# Patient Record
Sex: FEMALE | Race: BLACK OR AFRICAN AMERICAN | Employment: OTHER | URBAN - METROPOLITAN AREA
[De-identification: names, ages, dates, MRNs, and addresses within clinical notes are randomized per-mention and may not be internally consistent; named-entity substitution may affect disease eponyms.]

---

## 2018-05-25 ENCOUNTER — HOSPITAL ENCOUNTER (EMERGENCY)
Age: 78
Discharge: HOME | End: 2018-05-25
Attending: EMERGENCY MEDICINE
Payer: MEDICARE

## 2018-05-25 VITALS
SYSTOLIC BLOOD PRESSURE: 144 MMHG | DIASTOLIC BLOOD PRESSURE: 64 MMHG | TEMPERATURE: 98.1 F | BODY MASS INDEX: 41.99 KG/M2 | WEIGHT: 237 LBS | HEIGHT: 63 IN | HEART RATE: 90 BPM | OXYGEN SATURATION: 92 % | RESPIRATION RATE: 18 BRPM

## 2018-05-25 DIAGNOSIS — B02.29 POSTHERPETIC NEURALGIA: Primary | ICD-10-CM

## 2018-05-25 PROCEDURE — 99283 EMERGENCY DEPT VISIT LOW MDM: CPT | Performed by: EMERGENCY MEDICINE

## 2018-05-25 RX ORDER — VALACYCLOVIR HYDROCHLORIDE 1 G/1
1000 TABLET, FILM COATED ORAL 2 TIMES DAILY
Qty: 10 TAB | Refills: 2 | Status: SHIPPED | OUTPATIENT
Start: 2018-05-25 | End: 2018-06-15 | Stop reason: SDUPTHER

## 2018-05-25 RX ORDER — GABAPENTIN 100 MG/1
CAPSULE ORAL
Qty: 30 CAP | Refills: 1 | Status: SHIPPED | OUTPATIENT
Start: 2018-05-25 | End: 2020-11-04

## 2018-05-25 RX ORDER — TRAMADOL HYDROCHLORIDE 50 MG/1
50 TABLET ORAL
Qty: 17 TAB | Refills: 0 | Status: SHIPPED | OUTPATIENT
Start: 2018-05-25 | End: 2018-06-29 | Stop reason: DRUGHIGH

## 2018-05-25 RX ORDER — TETRACAINE HYDROCHLORIDE 5 MG/ML
1 SOLUTION OPHTHALMIC
Status: DISCONTINUED | OUTPATIENT
Start: 2018-05-25 | End: 2018-05-25 | Stop reason: ALTCHOICE

## 2018-05-25 NOTE — ED TRIAGE NOTES
Pt in c/o headache for several years after having shingles. States pain has been constant for past 2 months.

## 2018-05-25 NOTE — DISCHARGE INSTRUCTIONS
Neuropathic Pain: Care Instructions  Your Care Instructions    Neuropathic pain is caused by pressure on or damage to your nerves. It's often simply called nerve pain. Some people feel this type of pain all the time. For others, it comes and goes. Diabetes, shingles, or an injury can cause nerve pain. Many people say the pain feels sharp, burning, or stabbing. But some people feel it as a dull ache. In some cases, it makes your skin very sensitive. So touch, pressure, and other sensations that did not hurt before may now cause pain. It's important to know that this kind of pain is real and can affect your quality of life. It's also important to know that treatment can help. Treatment includes pain medicines, exercise, and physical therapy. Medicines can help reduce the number of pain signals that travel over the nerves. This can make the painful areas less sensitive. It can also help you sleep better and improve your mood. But medicines are only one part of successful treatment. Most people do best with more than one kind of treatment. Your doctor may recommend that you try cognitive-behavioral therapy and stress management. Or, if needed, you may decide to try to quit smoking, lower your blood pressure, or better control blood sugar. These kinds of healthy changes can also make a difference. If you feel that your treatment is not working, talk to your doctor. And be sure to tell your doctor if you think you might be depressed or anxious. These are common problems that can also be treated. Follow-up care is a key part of your treatment and safety. Be sure to make and go to all appointments, and call your doctor if you are having problems. It's also a good idea to know your test results and keep a list of the medicines you take. How can you care for yourself at home? · Be safe with medicines. Read and follow all instructions on the label.   ¨ If the doctor gave you a prescription medicine for pain, take it as prescribed. ¨ If you are not taking a prescription pain medicine, ask your doctor if you can take an over-the-counter medicine. · Save hard tasks for days when you have less pain. Follow a hard task with an easy task. And remember to take breaks. · Relax, and reduce stress. You may want to try deep breathing or meditation. These can help. · Keep moving. Gentle, daily exercise can help reduce pain. Your doctor or physical therapist can tell you what type of exercise is best for you. This may include walking, swimming, and stationary biking. It may also include stretches and range-of-motion exercises. · Try heat, cold packs, and massage. · Get enough sleep. Constant pain can make you more tired. If the pain makes it hard to sleep, talk with your doctor. · Think positively. Your thoughts can affect your pain. Do fun things to distract yourself from the pain. See a movie, read a book, listen to music, or spend time with a friend. · Keep a pain diary. Try to write down how strong your pain is and what it feels like. Also try to notice and write down how your moods, thoughts, sleep, activities, and medicine affect your pain. These notes can help you and your doctor find the best ways to treat your pain. Reducing constipation caused by pain medicine  Pain medicines often cause constipation. To reduce constipation:  · Include fruits, vegetables, beans, and whole grains in your diet each day. These foods are high in fiber. · Drink plenty of fluids, enough so that your urine is light yellow or clear like water. If you have kidney, heart, or liver disease and have to limit fluids, talk with your doctor before you increase the amount of fluids you drink. · Get some exercise every day. Build up slowly to 30 to 60 minutes a day on 5 or more days of the week. · Take a fiber supplement, such as Citrucel or Metamucil, every day if needed. Read and follow all instructions on the label.   · Schedule time each day for a bowel movement. Having a daily routine may help. Take your time and do not strain when having a bowel movement. · Ask your doctor about a laxative. The goal is to have one easy bowel movement every 1 to 2 days. Do not let constipation go untreated for more than 3 days. When should you call for help? Call your doctor now or seek immediate medical care if:  ? · You feel sad, anxious, or hopeless for more than a few days. This could mean you are depressed. Depression is common in people who have a lot of pain. But it can be treated. ? · You have trouble with bowel movements, such as:  ¨ No bowel movement in 3 days. ¨ Blood in the anal area, in your stool, or on the toilet paper. ¨ Diarrhea for more than 24 hours. ? Watch closely for changes in your health, and be sure to contact your doctor if:  ? · Your pain is getting worse. ? · You can't sleep because of pain. ? · You are very worried or anxious about your pain. ? · You have trouble taking your pain medicine. ? · You have any concerns about your pain medicine or its side effects. ? · You have vomiting or cramps for more than 2 hours. Where can you learn more? Go to http://jaelyn-janey.info/. Enter A893 in the search box to learn more about \"Neuropathic Pain: Care Instructions. \"  Current as of: October 14, 2016  Content Version: 11.4  © 1881-3966 [employer]. Care instructions adapted under license by [employer] (which disclaims liability or warranty for this information). If you have questions about a medical condition or this instruction, always ask your healthcare professional. [address] any warranty or liability for your use of this information.

## 2018-05-25 NOTE — ED PROVIDER NOTES
HPI Comments: 65-year-old female presents to the emergency department with left sided head pain. Started several weeks ago. Getting worse. No alleviating. She had shingles several years ago. Had intermittent episodes of pain since then. This is worse    Patient is a 77 y.o. female presenting with headaches. Headache           Past Medical History:   Diagnosis Date    Arthritis     Diabetes ([address]     Hypertension     Shingles        History reviewed. No pertinent surgical history. History reviewed. No pertinent family history. Social History     Social History    Marital status: SINGLE     Spouse name: N/A    Number of children: N/A    Years of education: N/A     Occupational History    Not on file. Social History Main Topics    Smoking status: Current Some Day Smoker    Smokeless tobacco: Never Used    Alcohol use No    Drug use: No    Sexual activity: Not on file     Other Topics Concern    Not on file     Social History Narrative    No narrative on file         ALLERGIES: Review of patient's allergies indicates no known allergies. Review of Systems   Constitutional: Negative for chills. Eyes: Positive for photophobia and pain. Neurological: Positive for headaches. Vitals:    05/25/18 0949 05/25/18 0954   BP: 124/66    Pulse: 90    Resp: 18    Temp: 98.1 °F (36.7 °C)    SpO2: 95% 95%   Weight: 107.5 kg (237 lb)    Height: 5' 3\" (1.6 m)             Physical Exam   Constitutional: She is oriented to person, place, and time. She appears well-developed and well-nourished. HENT:   tthe left scalp frontal temporal upper cheek is hyperesthetic. No rash or lesions is noted   Eyes:   The eyes red. Fluorescein staining is performed. No areas of increased uptake. No dendritic lesions. Neurological: She is alert and oriented to person, place, and time. Skin: Skin is warm and dry. Psychiatric: She has a normal mood and affect.  Her behavior is normal.   Nursing note and vitals reviewed. MDM  Number of Diagnoses or Management Options  Diagnosis management comments: 44-year-old female with unilateral headache. Paresthesias. Hyperesthesia. History significant shingles on that side several years ago    Suspect she has a postherpetic neuralgia. Treat her pain. Discharged follow-up with primary care.         ED Course       Procedures

## 2018-05-25 NOTE — ED NOTES
I have reviewed discharge instructions with the patient and caregiver. The patient and caregiver verbalized understanding. Patient left ED via Discharge Method: wheelchair to Home with grandaughter  Opportunity for questions and clarification provided. Patient given 3 scripts. To continue your aftercare when you leave the hospital, you may receive an automated call from our care team to check in on how you are doing. This is a free service and part of our promise to provide the best care and service to meet your aftercare needs.  If you have questions, or wish to unsubscribe from this service please call [phone]. Thank you for Choosing our [facility] Emergency Department.

## 2018-06-15 PROBLEM — R20.2 PARESTHESIA: Status: ACTIVE | Noted: 2018-06-15

## 2018-06-15 PROBLEM — B02.9 HERPES ZOSTER: Status: ACTIVE | Noted: 2018-06-15

## 2018-06-15 PROBLEM — B02.22 ACUTE TRIGEMINAL HERPES ZOSTER: Status: ACTIVE | Noted: 2018-06-15

## 2018-06-15 PROBLEM — E66.01 SEVERE OBESITY (BMI 35.0-39.9): Status: ACTIVE | Noted: 2018-06-15

## 2018-06-29 PROBLEM — B02.22 ACUTE TRIGEMINAL HERPES ZOSTER: Status: RESOLVED | Noted: 2018-06-15 | Resolved: 2018-06-29

## 2018-07-18 PROBLEM — R74.8 ELEVATED ALKALINE PHOSPHATASE LEVEL: Status: ACTIVE | Noted: 2018-07-18

## 2018-07-18 PROBLEM — M19.90 ARTHRITIS: Status: ACTIVE | Noted: 2018-07-18

## 2018-08-31 ENCOUNTER — HOSPITAL ENCOUNTER (EMERGENCY)
Age: 78
Discharge: HOME | End: 2018-08-31
Attending: EMERGENCY MEDICINE
Payer: MEDICARE

## 2018-08-31 ENCOUNTER — APPOINTMENT (OUTPATIENT)
Dept: GENERAL RADIOLOGY | Age: 78
End: 2018-08-31
Attending: EMERGENCY MEDICINE
Payer: MEDICARE

## 2018-08-31 ENCOUNTER — APPOINTMENT (OUTPATIENT)
Dept: ULTRASOUND IMAGING | Age: 78
End: 2018-08-31
Attending: EMERGENCY MEDICINE
Payer: MEDICARE

## 2018-08-31 ENCOUNTER — APPOINTMENT (OUTPATIENT)
Dept: CT IMAGING | Age: 78
End: 2018-08-31
Attending: EMERGENCY MEDICINE
Payer: MEDICARE

## 2018-08-31 VITALS
HEIGHT: 63 IN | BODY MASS INDEX: 41.99 KG/M2 | SYSTOLIC BLOOD PRESSURE: 153 MMHG | WEIGHT: 237 LBS | OXYGEN SATURATION: 95 % | HEART RATE: 71 BPM | RESPIRATION RATE: 13 BRPM | DIASTOLIC BLOOD PRESSURE: 119 MMHG | TEMPERATURE: 98.6 F

## 2018-08-31 DIAGNOSIS — M79.661 RIGHT CALF PAIN: Primary | ICD-10-CM

## 2018-08-31 LAB
ANION GAP SERPL CALC-SCNC: 8 MMOL/L (ref 7–16)
APTT PPP: <20 SEC (ref 23.2–35.3)
ATRIAL RATE: 72 BPM
BASOPHILS # BLD: 0 K/UL (ref 0–0.2)
BASOPHILS NFR BLD: 0 % (ref 0–2)
BUN SERPL-MCNC: 19 MG/DL (ref 8–23)
CALCIUM SERPL-MCNC: 8.8 MG/DL (ref 8.3–10.4)
CALCULATED P AXIS, ECG09: 67 DEGREES
CALCULATED R AXIS, ECG10: -8 DEGREES
CALCULATED T AXIS, ECG11: 65 DEGREES
CHLORIDE SERPL-SCNC: 106 MMOL/L (ref 98–107)
CO2 SERPL-SCNC: 29 MMOL/L (ref 21–32)
CREAT SERPL-MCNC: 1.14 MG/DL (ref 0.6–1)
DIAGNOSIS, 93000: NORMAL
DIFFERENTIAL METHOD BLD: NORMAL
EOSINOPHIL # BLD: 0.3 K/UL (ref 0–0.8)
EOSINOPHIL NFR BLD: 3 % (ref 0.5–7.8)
ERYTHROCYTE [DISTWIDTH] IN BLOOD BY AUTOMATED COUNT: 18.4 %
GLUCOSE SERPL-MCNC: 100 MG/DL (ref 65–100)
HCT VFR BLD AUTO: 45 % (ref 35.8–46.3)
HGB BLD-MCNC: 14.7 G/DL (ref 11.7–15.4)
IMM GRANULOCYTES # BLD: 0 K/UL (ref 0–0.5)
IMM GRANULOCYTES NFR BLD AUTO: 0 % (ref 0–5)
INR PPP: 1
LYMPHOCYTES # BLD: 2.9 K/UL (ref 0.5–4.6)
LYMPHOCYTES NFR BLD: 32 % (ref 13–44)
MCH RBC QN AUTO: 31.3 PG (ref 26.1–32.9)
MCHC RBC AUTO-ENTMCNC: 32.7 G/DL (ref 31.4–35)
MCV RBC AUTO: 95.9 FL (ref 79.6–97.8)
MONOCYTES # BLD: 0.9 K/UL (ref 0.1–1.3)
MONOCYTES NFR BLD: 10 % (ref 4–12)
NEUTS SEG # BLD: 5 K/UL (ref 1.7–8.2)
NEUTS SEG NFR BLD: 55 % (ref 43–78)
NRBC # BLD: 0 K/UL (ref 0–0.2)
P-R INTERVAL, ECG05: 208 MS
PLATELET # BLD AUTO: 267 K/UL (ref 150–450)
PMV BLD AUTO: NORMAL FL (ref 9.4–12.3)
POTASSIUM SERPL-SCNC: 4.5 MMOL/L (ref 3.5–5.1)
PROTHROMBIN TIME: 12.4 SEC (ref 11.5–14.5)
Q-T INTERVAL, ECG07: 380 MS
QRS DURATION, ECG06: 78 MS
QTC CALCULATION (BEZET), ECG08: 416 MS
RBC # BLD AUTO: 4.69 M/UL (ref 4.05–5.2)
SODIUM SERPL-SCNC: 143 MMOL/L (ref 136–145)
VENTRICULAR RATE, ECG03: 72 BPM
WBC # BLD AUTO: 9.1 K/UL (ref 4.3–11.1)

## 2018-08-31 PROCEDURE — 93971 EXTREMITY STUDY: CPT

## 2018-08-31 PROCEDURE — 99284 EMERGENCY DEPT VISIT MOD MDM: CPT | Performed by: EMERGENCY MEDICINE

## 2018-08-31 PROCEDURE — 71260 CT THORAX DX C+: CPT

## 2018-08-31 PROCEDURE — 80048 BASIC METABOLIC PNL TOTAL CA: CPT

## 2018-08-31 PROCEDURE — 85610 PROTHROMBIN TIME: CPT

## 2018-08-31 PROCEDURE — 74011000258 HC RX REV CODE- 258: Performed by: EMERGENCY MEDICINE

## 2018-08-31 PROCEDURE — 85730 THROMBOPLASTIN TIME PARTIAL: CPT

## 2018-08-31 PROCEDURE — 74011250636 HC RX REV CODE- 250/636: Performed by: EMERGENCY MEDICINE

## 2018-08-31 PROCEDURE — 74011636320 HC RX REV CODE- 636/320: Performed by: EMERGENCY MEDICINE

## 2018-08-31 PROCEDURE — 71045 X-RAY EXAM CHEST 1 VIEW: CPT

## 2018-08-31 PROCEDURE — 96374 THER/PROPH/DIAG INJ IV PUSH: CPT | Performed by: EMERGENCY MEDICINE

## 2018-08-31 PROCEDURE — 85025 COMPLETE CBC W/AUTO DIFF WBC: CPT

## 2018-08-31 PROCEDURE — 93005 ELECTROCARDIOGRAM TRACING: CPT | Performed by: EMERGENCY MEDICINE

## 2018-08-31 RX ORDER — TRAMADOL HYDROCHLORIDE 50 MG/1
50 TABLET ORAL
Qty: 14 TAB | Refills: 0 | Status: ON HOLD | OUTPATIENT
Start: 2018-08-31 | End: 2022-05-02

## 2018-08-31 RX ORDER — MORPHINE SULFATE 2 MG/ML
2 INJECTION, SOLUTION INTRAMUSCULAR; INTRAVENOUS ONCE
Status: COMPLETED | OUTPATIENT
Start: 2018-08-31 | End: 2018-08-31

## 2018-08-31 RX ORDER — SODIUM CHLORIDE 0.9 % (FLUSH) 0.9 %
10 SYRINGE (ML) INJECTION
Status: COMPLETED | OUTPATIENT
Start: 2018-08-31 | End: 2018-08-31

## 2018-08-31 RX ADMIN — MORPHINE SULFATE 2 MG: 2 INJECTION, SOLUTION INTRAMUSCULAR; INTRAVENOUS at 12:22

## 2018-08-31 RX ADMIN — IOPAMIDOL 100 ML: 755 INJECTION, SOLUTION INTRAVENOUS at 13:29

## 2018-08-31 RX ADMIN — SODIUM CHLORIDE 100 ML: 900 INJECTION, SOLUTION INTRAVENOUS at 13:29

## 2018-08-31 RX ADMIN — Medication 10 ML: at 13:29

## 2018-08-31 NOTE — Clinical Note
Studies today including ultrasound and CT of your chest show no evidence of blood clot in your legs or in your lungs. No fever or infectious changes identified. Strong pulses noted to both lower legs

## 2018-08-31 NOTE — ED PROVIDER NOTES
HPI Comments: For several dayshad some discomfort to her right calf. She's identified no significant swelling. No provoking event, injury, or other mechanism prior to onset of pain that she is aware of. Denies any chest pain or shortness of breath beyond her baseline. She is oxygen dependent as her norm. No fever. No redness. No cyanosis to the lower extremity. No history of arterial or venous disease. Seen this morning by Dr. Portia Jerez and sent to our department Patient is a 77 y.o. female presenting with leg pain. The history is provided by the patient. Leg Pain This is a new problem. The current episode started 2 days ago. The pain is present in the right lower leg. The pain is severe. Pertinent negatives include no numbness and full range of motion. The symptoms are aggravated by movement and palpation. She has tried nothing for the symptoms. Past Medical History:  
Diagnosis Date  Arthritis  Diabetes ([address]  Hypertension  Shingles History reviewed. No pertinent surgical history. History reviewed. No pertinent family history. Social History Social History  Marital status: SINGLE Spouse name: N/A  
 Number of children: N/A  
 Years of education: N/A Occupational History  Not on file. Social History Main Topics  Smoking status: Current Some Day Smoker  Smokeless tobacco: Never Used  Alcohol use No  
 Drug use: No  
 Sexual activity: Not on file Other Topics Concern  Not on file Social History Narrative ALLERGIES: Chocolate flavor and Triamcinolone acetonide Review of Systems Constitutional: Negative for chills and fever. HENT: Negative. Respiratory:  
     Initially wheezing but now resolved history of oxygen dependent Cardiovascular: Negative. Genitourinary: Negative. Neurological: Negative for numbness. All other systems reviewed and are negative. Vitals:  
 08/31/18 1118 BP: 117/64 Resp: 28 Temp: 98.6 °F (37 °C) SpO2: (!) 88% Weight: 107.5 kg (237 lb) Height: 5' 3\" (1.6 m) Physical Exam  
Constitutional: She appears well-developed and well-nourished. No distress. HENT:  
Head: Atraumatic. Eyes: No scleral icterus. Neck: Neck supple. Cardiovascular: Normal rate and intact distal pulses. Strong DP bilat with no cyanosis or pallor Pulmonary/Chest: Effort normal. No respiratory distress. Report of wheezing initially in the ER but time of my exam no wheezing identified Abdominal: Soft. Musculoskeletal: Normal range of motion. She exhibits tenderness. She exhibits no edema or deformity. Neurological: She is alert. She exhibits normal muscle tone. Coordination normal.  
Skin: Skin is warm and dry. No rash noted. No erythema. Psychiatric: Her behavior is normal. Thought content normal.  
Nursing note and vitals reviewed. MDM Number of Diagnoses or Management Options Right calf pain:  
Diagnosis management comments: Calf area discomfort with no evidence of bony pain adjacent. Ultrasound is ordered which shows no deep venous thrombosis. No evidence of cellulitis. Excellent distal perfusion. CT scan of the chest was ordered in triage and this is normal.  The patient has no pleuritic pain shortness of breath or acute respiratory other than baseline COPD and oxygen dependence Amount and/or Complexity of Data Reviewed Clinical lab tests: reviewed Tests in the radiology section of CPT®: reviewed and ordered Decide to obtain previous medical records or to obtain history from someone other than the patient: yes Discuss the patient with other providers: yes (Discussed with Dr. Isabella Todd before patient arrived) Risk of Complications, Morbidity, and/or Mortality Presenting problems: high Diagnostic procedures: low Management options: moderate General comments: Acute Pain recurring ×2 days.   No evidence of ischemic changes. Diagnostic studies show no venous issues. Most likely cramping type pain Patient Progress Patient progress: improved ED Course Procedures

## 2018-08-31 NOTE — ED NOTES
I have reviewed discharge instructions with the patient. The patient verbalized understanding. Patient left ED via Discharge Method: wheelchair to Home with family. Opportunity for questions and clarification provided. Patient given 1 scripts. To continue your aftercare when you leave the hospital, you may receive an automated call from our care team to check in on how you are doing. This is a free service and part of our promise to provide the best care and service to meet your aftercare needs.  If you have questions, or wish to unsubscribe from this service please call [phone]. [facility].

## 2018-08-31 NOTE — ED TRIAGE NOTES
Sent from Dr. Maryann Garcia office. Pt states right leg hurting all the way from knee to ankle. Pt actively wheezing in triage. States she normally wears 2L NC at home but left her tank at home today. 2L NC placed on pt in triage. Pt states she has no hx of blood clots. States leg is warm to touch and tender on palpation.

## 2018-08-31 NOTE — DISCHARGE INSTRUCTIONS
Leg Pain: Care Instructions  Your Care Instructions  Many things can cause leg pain. Too much exercise or overuse can cause a muscle cramp (or charley horse). You can get leg cramps from not eating a balanced diet that has enough potassium, calcium, and other minerals. If you do not drink enough fluids or are taking certain medicines, you may develop leg cramps. Other causes of leg pain include injuries, blood flow problems, nerve damage, and twisted and enlarged veins (varicose veins). You can usually ease pain with self-care. Your doctor may recommend that you rest your leg and keep it elevated. Follow-up care is a key part of your treatment and safety. Be sure to make and go to all appointments, and call your doctor if you are having problems. It's also a good idea to know your test results and keep a list of the medicines you take. How can you care for yourself at home? · Take pain medicines exactly as directed. ¨ If the doctor gave you a prescription medicine for pain, take it as prescribed. ¨ If you are not taking a prescription pain medicine, ask your doctor if you can take an over-the-counter medicine. · Take any other medicines exactly as prescribed. Call your doctor if you think you are having a problem with your medicine. · Rest your leg while you have pain, and avoid standing for long periods of time. · Prop up your leg at or above the level of your heart when possible. · Make sure you are eating a balanced diet that is rich in calcium, potassium, and magnesium, especially if you are pregnant. · If directed by your doctor, put ice or a cold pack on the area for 10 to 20 minutes at a time. Put a thin cloth between the ice and your skin. · Your leg may be in a splint, a brace, or an elastic bandage, and you may have crutches to help you walk. Follow your doctor's directions about how long to wear supports and how to use the crutches. When should you call for help?   Call 911 anytime you think you may need emergency care. For example, call if:    · You have sudden chest pain and shortness of breath, or you cough up blood.     · Your leg is cool or pale or changes color.    Call your doctor now or seek immediate medical care if:    · You have increasing or severe pain.     · Your leg suddenly feels weak and you cannot move it.     · You have signs of a blood clot, such as:  ¨ Pain in your calf, back of the knee, thigh, or groin. ¨ Redness and swelling in your leg or groin.     · You have signs of infection, such as:  ¨ Increased pain, swelling, warmth, or redness. ¨ Red streaks leading from the sore area. ¨ Pus draining from a place on your leg. ¨ A fever.     · You cannot bear weight on your leg.    Watch closely for changes in your health, and be sure to contact your doctor if:    · You do not get better as expected. Where can you learn more? Go to http://jaelyn-janey.info/. Enter G239 in the search box to learn more about \"Leg Pain: Care Instructions. \"  Current as of: November 20, 2017  Content Version: 11.7  © 1098-2152 [employer]. Care instructions adapted under license by "[employer]" (which disclaims liability or warranty for this information). If you have questions about a medical condition or this instruction, always ask your healthcare professional. [address] any warranty or liability for your use of this information.

## 2020-10-31 ENCOUNTER — HOSPITAL ENCOUNTER (INPATIENT)
Age: 80
LOS: 4 days | Discharge: HOME HEALTH | DRG: 689 | End: 2020-11-04
Attending: EMERGENCY MEDICINE | Admitting: HOSPITALIST
Payer: MEDICARE

## 2020-10-31 ENCOUNTER — APPOINTMENT (OUTPATIENT)
Dept: GENERAL RADIOLOGY | Age: 80
DRG: 689 | End: 2020-10-31
Attending: EMERGENCY MEDICINE
Payer: MEDICARE

## 2020-10-31 ENCOUNTER — APPOINTMENT (OUTPATIENT)
Dept: CT IMAGING | Age: 80
DRG: 689 | End: 2020-10-31
Attending: EMERGENCY MEDICINE
Payer: MEDICARE

## 2020-10-31 DIAGNOSIS — I50.9 CONGESTIVE HEART FAILURE, UNSPECIFIED HF CHRONICITY, UNSPECIFIED HEART FAILURE TYPE (HCC): ICD-10-CM

## 2020-10-31 DIAGNOSIS — E87.70 HYPERVOLEMIA, UNSPECIFIED HYPERVOLEMIA TYPE: ICD-10-CM

## 2020-10-31 DIAGNOSIS — R41.82 ALTERED MENTAL STATUS, UNSPECIFIED ALTERED MENTAL STATUS TYPE: ICD-10-CM

## 2020-10-31 DIAGNOSIS — N39.0 ACUTE UTI: Primary | ICD-10-CM

## 2020-10-31 LAB
ALBUMIN SERPL-MCNC: 2.9 G/DL (ref 3.2–4.6)
ALBUMIN/GLOB SERPL: 0.8 {RATIO} (ref 1.2–3.5)
ALP SERPL-CCNC: 109 U/L (ref 50–136)
ALT SERPL-CCNC: 22 U/L (ref 12–65)
ANION GAP SERPL CALC-SCNC: 1 MMOL/L (ref 7–16)
APPEARANCE UR: ABNORMAL
AST SERPL-CCNC: 23 U/L (ref 15–37)
BACTERIA URNS QL MICRO: ABNORMAL /HPF
BASOPHILS # BLD: 0 K/UL (ref 0–0.2)
BASOPHILS NFR BLD: 0 % (ref 0–2)
BILIRUB SERPL-MCNC: 0.5 MG/DL (ref 0.2–1.1)
BILIRUB UR QL: NEGATIVE
BNP SERPL-MCNC: 2536 PG/ML
BUN SERPL-MCNC: 14 MG/DL (ref 8–23)
CALCIUM SERPL-MCNC: 9.1 MG/DL (ref 8.3–10.4)
CHLORIDE SERPL-SCNC: 104 MMOL/L (ref 98–107)
CO2 SERPL-SCNC: 37 MMOL/L (ref 21–32)
COLOR UR: YELLOW
CREAT SERPL-MCNC: 0.88 MG/DL (ref 0.6–1)
CRP SERPL-MCNC: 7.9 MG/DL (ref 0–0.9)
DIFFERENTIAL METHOD BLD: ABNORMAL
EOSINOPHIL # BLD: 0.3 K/UL (ref 0–0.8)
EOSINOPHIL NFR BLD: 4 % (ref 0.5–7.8)
EPI CELLS #/AREA URNS HPF: ABNORMAL /HPF
ERYTHROCYTE [DISTWIDTH] IN BLOOD BY AUTOMATED COUNT: 15.4 % (ref 11.9–14.6)
GLOBULIN SER CALC-MCNC: 3.5 G/DL (ref 2.3–3.5)
GLUCOSE SERPL-MCNC: 72 MG/DL (ref 65–100)
GLUCOSE UR STRIP.AUTO-MCNC: NEGATIVE MG/DL
HCT VFR BLD AUTO: 52.7 % (ref 35.8–46.3)
HGB BLD-MCNC: 16 G/DL (ref 11.7–15.4)
HGB UR QL STRIP: NEGATIVE
IMM GRANULOCYTES # BLD AUTO: 0 K/UL (ref 0–0.5)
IMM GRANULOCYTES NFR BLD AUTO: 0 % (ref 0–5)
KETONES UR QL STRIP.AUTO: NEGATIVE MG/DL
LACTATE SERPL-SCNC: 1 MMOL/L (ref 0.4–2)
LEUKOCYTE ESTERASE UR QL STRIP.AUTO: ABNORMAL
LIPASE SERPL-CCNC: 136 U/L (ref 73–393)
LYMPHOCYTES # BLD: 1.4 K/UL (ref 0.5–4.6)
LYMPHOCYTES NFR BLD: 19 % (ref 13–44)
MAGNESIUM SERPL-MCNC: 1.9 MG/DL (ref 1.8–2.4)
MCH RBC QN AUTO: 31.3 PG (ref 26.1–32.9)
MCHC RBC AUTO-ENTMCNC: 30.4 G/DL (ref 31.4–35)
MCV RBC AUTO: 103.1 FL (ref 79.6–97.8)
MONOCYTES # BLD: 0.9 K/UL (ref 0.1–1.3)
MONOCYTES NFR BLD: 13 % (ref 4–12)
NEUTS SEG # BLD: 4.4 K/UL (ref 1.7–8.2)
NEUTS SEG NFR BLD: 63 % (ref 43–78)
NITRITE UR QL STRIP.AUTO: NEGATIVE
NRBC # BLD: 0 K/UL (ref 0–0.2)
PH UR STRIP: 7 [PH] (ref 5–9)
PLATELET # BLD AUTO: 157 K/UL (ref 150–450)
PMV BLD AUTO: 12.4 FL (ref 9.4–12.3)
POTASSIUM SERPL-SCNC: 4.9 MMOL/L (ref 3.5–5.1)
PROT SERPL-MCNC: 6.4 G/DL (ref 6.3–8.2)
PROT UR STRIP-MCNC: ABNORMAL MG/DL
RBC # BLD AUTO: 5.11 M/UL (ref 4.05–5.2)
SODIUM SERPL-SCNC: 142 MMOL/L (ref 136–145)
SP GR UR REFRACTOMETRY: 1.01 (ref 1–1.02)
TROPONIN-HIGH SENSITIVITY: 83.1 PG/ML (ref 0–14)
TROPONIN-HIGH SENSITIVITY: 90.9 PG/ML (ref 0–14)
TSH SERPL DL<=0.005 MIU/L-ACNC: 1.03 UIU/ML
UROBILINOGEN UR QL STRIP.AUTO: 2 EU/DL (ref 0.2–1)
WBC # BLD AUTO: 7 K/UL (ref 4.3–11.1)
WBC URNS QL MICRO: ABNORMAL /HPF

## 2020-10-31 PROCEDURE — 99284 EMERGENCY DEPT VISIT MOD MDM: CPT

## 2020-10-31 PROCEDURE — 87635 SARS-COV-2 COVID-19 AMP PRB: CPT

## 2020-10-31 PROCEDURE — 86140 C-REACTIVE PROTEIN: CPT

## 2020-10-31 PROCEDURE — 74011250637 HC RX REV CODE- 250/637: Performed by: HOSPITALIST

## 2020-10-31 PROCEDURE — 87088 URINE BACTERIA CULTURE: CPT

## 2020-10-31 PROCEDURE — 96374 THER/PROPH/DIAG INJ IV PUSH: CPT

## 2020-10-31 PROCEDURE — 84443 ASSAY THYROID STIM HORMONE: CPT

## 2020-10-31 PROCEDURE — 83690 ASSAY OF LIPASE: CPT

## 2020-10-31 PROCEDURE — 74011250636 HC RX REV CODE- 250/636: Performed by: EMERGENCY MEDICINE

## 2020-10-31 PROCEDURE — 87086 URINE CULTURE/COLONY COUNT: CPT

## 2020-10-31 PROCEDURE — 96375 TX/PRO/DX INJ NEW DRUG ADDON: CPT

## 2020-10-31 PROCEDURE — 36415 COLL VENOUS BLD VENIPUNCTURE: CPT

## 2020-10-31 PROCEDURE — 80053 COMPREHEN METABOLIC PANEL: CPT

## 2020-10-31 PROCEDURE — 2709999900 HC NON-CHARGEABLE SUPPLY

## 2020-10-31 PROCEDURE — 84484 ASSAY OF TROPONIN QUANT: CPT

## 2020-10-31 PROCEDURE — 87186 SC STD MICRODIL/AGAR DIL: CPT

## 2020-10-31 PROCEDURE — 70450 CT HEAD/BRAIN W/O DYE: CPT

## 2020-10-31 PROCEDURE — 81001 URINALYSIS AUTO W/SCOPE: CPT

## 2020-10-31 PROCEDURE — 83880 ASSAY OF NATRIURETIC PEPTIDE: CPT

## 2020-10-31 PROCEDURE — 83735 ASSAY OF MAGNESIUM: CPT

## 2020-10-31 PROCEDURE — 65660000000 HC RM CCU STEPDOWN

## 2020-10-31 PROCEDURE — 71045 X-RAY EXAM CHEST 1 VIEW: CPT

## 2020-10-31 PROCEDURE — 83605 ASSAY OF LACTIC ACID: CPT

## 2020-10-31 PROCEDURE — 85025 COMPLETE CBC W/AUTO DIFF WBC: CPT

## 2020-10-31 PROCEDURE — 74011000258 HC RX REV CODE- 258: Performed by: EMERGENCY MEDICINE

## 2020-10-31 PROCEDURE — 51701 INSERT BLADDER CATHETER: CPT

## 2020-10-31 RX ORDER — ENOXAPARIN SODIUM 100 MG/ML
40 INJECTION SUBCUTANEOUS EVERY 24 HOURS
Status: DISCONTINUED | OUTPATIENT
Start: 2020-11-01 | End: 2020-11-04 | Stop reason: HOSPADM

## 2020-10-31 RX ORDER — ONDANSETRON 2 MG/ML
4 INJECTION INTRAMUSCULAR; INTRAVENOUS
Status: DISCONTINUED | OUTPATIENT
Start: 2020-10-31 | End: 2020-11-04 | Stop reason: HOSPADM

## 2020-10-31 RX ORDER — MELATONIN
2000 2 TIMES DAILY
Status: DISCONTINUED | OUTPATIENT
Start: 2020-11-01 | End: 2020-11-04 | Stop reason: HOSPADM

## 2020-10-31 RX ORDER — GABAPENTIN 100 MG/1
100 CAPSULE ORAL 3 TIMES DAILY
Status: DISCONTINUED | OUTPATIENT
Start: 2020-10-31 | End: 2020-11-04 | Stop reason: HOSPADM

## 2020-10-31 RX ORDER — FUROSEMIDE 10 MG/ML
40 INJECTION INTRAMUSCULAR; INTRAVENOUS
Status: COMPLETED | OUTPATIENT
Start: 2020-10-31 | End: 2020-10-31

## 2020-10-31 RX ORDER — FUROSEMIDE 10 MG/ML
20 INJECTION INTRAMUSCULAR; INTRAVENOUS 2 TIMES DAILY
Status: DISCONTINUED | OUTPATIENT
Start: 2020-11-01 | End: 2020-11-02

## 2020-10-31 RX ORDER — ACETAMINOPHEN 325 MG/1
650 TABLET ORAL
Status: DISCONTINUED | OUTPATIENT
Start: 2020-10-31 | End: 2020-11-04 | Stop reason: HOSPADM

## 2020-10-31 RX ORDER — BUDESONIDE AND FORMOTEROL FUMARATE DIHYDRATE 160; 4.5 UG/1; UG/1
2 AEROSOL RESPIRATORY (INHALATION)
Status: DISCONTINUED | OUTPATIENT
Start: 2020-10-31 | End: 2020-11-04 | Stop reason: HOSPADM

## 2020-10-31 RX ORDER — LOSARTAN POTASSIUM 25 MG/1
25 TABLET ORAL DAILY
Status: DISCONTINUED | OUTPATIENT
Start: 2020-11-01 | End: 2020-11-04 | Stop reason: HOSPADM

## 2020-10-31 RX ORDER — TRAMADOL HYDROCHLORIDE 50 MG/1
50 TABLET ORAL
Status: DISCONTINUED | OUTPATIENT
Start: 2020-10-31 | End: 2020-11-04 | Stop reason: HOSPADM

## 2020-10-31 RX ORDER — LATANOPROST 50 UG/ML
1 SOLUTION/ DROPS OPHTHALMIC EVERY EVENING
Status: DISCONTINUED | OUTPATIENT
Start: 2020-11-01 | End: 2020-11-04 | Stop reason: HOSPADM

## 2020-10-31 RX ORDER — ATORVASTATIN CALCIUM 10 MG/1
10 TABLET, FILM COATED ORAL
Status: DISCONTINUED | OUTPATIENT
Start: 2020-10-31 | End: 2020-11-04 | Stop reason: HOSPADM

## 2020-10-31 RX ADMIN — CEFTRIAXONE 1 G: 1 INJECTION, POWDER, FOR SOLUTION INTRAMUSCULAR; INTRAVENOUS at 17:34

## 2020-10-31 RX ADMIN — FUROSEMIDE 40 MG: 10 INJECTION, SOLUTION INTRAMUSCULAR; INTRAVENOUS at 18:03

## 2020-10-31 RX ADMIN — ATORVASTATIN CALCIUM 10 MG: 10 TABLET, FILM COATED ORAL at 22:18

## 2020-10-31 RX ADMIN — GABAPENTIN 100 MG: 100 CAPSULE ORAL at 22:18

## 2020-10-31 RX ADMIN — ACETAMINOPHEN 650 MG: 325 TABLET, FILM COATED ORAL at 22:18

## 2020-10-31 NOTE — ED NOTES
Ms Lupe Francis daughter cell [phone] or [place] [phone]  Arturo Pererareena son cell [phone]

## 2020-10-31 NOTE — PROGRESS NOTES
TRANSFER - IN REPORT:    Verbal report received from Sadia Mccallum RN on Vielka Carrillo  being received from ED for routine progression of care      Report consisted of patients Situation, Background, Assessment and   Recommendations(SBAR). Information from the following report(s) SBAR, Kardex, ED Summary, Intake/Output and MAR was reviewed with the receiving nurse. Opportunity for questions and clarification was provided.

## 2020-10-31 NOTE — ED NOTES
TRANSFER - OUT REPORT:    Verbal report given to RN on Macie Barcenas  being transferred to [phone] for routine progression of care       Report consisted of patients Situation, Background, Assessment and   Recommendations(SBAR). Information from the following report(s) SBAR, ED Summary, [facility] and Recent Results was reviewed with the receiving nurse. Lines:   Peripheral IV 10/31/20 Left Antecubital (Active)   Site Assessment Clean, dry, & intact 10/31/20 1600   Phlebitis Assessment 0 10/31/20 1600   Infiltration Assessment 0 10/31/20 1600   Dressing Type Gauze;Tape;Transparent 10/31/20 1600   Hub Color/Line Status Blue;Flushed 10/31/20 1600        Opportunity for questions and clarification was provided.       Patient transported with:   Registered Nurse

## 2020-10-31 NOTE — ED TRIAGE NOTES
Pt daughter states that she has been confused since Wednesday, pt had slurred speech Wednesday, pt been incontinent, pt CO body aches all over and states stomach feels \"hot. \" PT able to answer questions of month and birthday but did not know why she was here. Pt O2 sat 83% on RA, states she wears 6 L O2 NC at home. 5 L applied NC and O2 sat at 100%. Mask applied to pt.

## 2020-10-31 NOTE — ED PROVIDER NOTES
79-year-old female with history of hypertension, chronic respiratory failure on 5-6 L O2 at baseline, diabetes, arthritis presents with daughter with complaint of confusion since Wednesday. According to daughter patient had slurred speech Wednesday which resolved. Additionally patient reportedly has been incontinent. Patient states that she has been having intermittent body aches. Denies chest pain, nausea, vomiting, urinary symptoms, URI symptoms, focal weakness, numbness, tingling. The history is provided by the patient. No  was used. Altered mental status    This is a new problem. The current episode started more than 2 days ago. The problem has not changed since onset. Associated symptoms include confusion. Pertinent negatives include no somnolence, no seizures, no unresponsiveness, no weakness, no agitation, no tingling and no numbness. Shortness of Breath   Pertinent negatives include no fever, no rhinorrhea, no sore throat, no neck pain, no wheezing, no chest pain, no vomiting and no rash. Generalized Body Aches   Associated symptoms include shortness of breath. Pertinent negatives include no chest pain. Past Medical History:   Diagnosis Date    Arthritis     Diabetes ([address]     Hypertension     Shingles        No past surgical history on file. No family history on file. Social History     Socioeconomic History    Marital status: SINGLE     Spouse name: Not on file    Number of children: Not on file    Years of education: Not on file    Highest education level: Not on file   Occupational History    Not on file   Social Needs    Financial resource strain: Not on file    Food insecurity     Worry: Not on file     Inability: Not on file    Transportation needs     Medical: Not on file     Non-medical: Not on file   Tobacco Use    Smoking status: Current Some Day Smoker    Smokeless tobacco: Never Used   Substance and Sexual Activity    Alcohol use:  No  Drug use: No    Sexual activity: Not on file   Lifestyle    Physical activity     Days per week: Not on file     Minutes per session: Not on file    Stress: Not on file   Relationships    Social connections     Talks on phone: Not on file     Gets together: Not on file     Attends [employer] service: Not on file     Active member of club or organization: Not on file     Attends meetings of clubs or organizations: Not on file     Relationship status: Not on file    Intimate partner violence     Fear of current or ex partner: Not on file     Emotionally abused: Not on file     Physically abused: Not on file     Forced sexual activity: Not on file   Other Topics Concern    Not on file   Social History Narrative    Not on file         ALLERGIES: Chocolate flavor and Triamcinolone acetonide    Review of Systems   Constitutional: Positive for chills. Negative for fatigue and fever. HENT: Negative for congestion, rhinorrhea and sore throat. Respiratory: Positive for shortness of breath. Negative for wheezing. Cardiovascular: Negative for chest pain. Gastrointestinal: Negative for constipation, diarrhea, nausea and vomiting. Genitourinary: Negative for dysuria and flank pain. Musculoskeletal: Negative for back pain, neck pain and neck stiffness. Skin: Negative for rash. Neurological: Positive for speech difficulty. Negative for dizziness, tingling, seizures, weakness, light-headedness and numbness. Psychiatric/Behavioral: Positive for confusion. Negative for agitation. Vitals:    10/31/20 1446 10/31/20 1502   BP: (!) 167/88    Pulse: 72    Resp: 24    Temp: 98.3 °F (36.8 °C)    SpO2: (!) 83% 99%   Weight: 86.2 kg (190 lb)    Height: 5' 3\" (1.6 m)             Physical Exam  Vitals signs and nursing note reviewed. Constitutional:       Comments: Chronically ill appearing.     HENT:      Mouth/Throat:      Mouth: Mucous membranes are moist.   Eyes:      Extraocular Movements: Extraocular movements intact. Pupils: Pupils are equal, round, and reactive to light. Neck:      Musculoskeletal: Normal range of motion. Cardiovascular:      Rate and Rhythm: Normal rate and regular rhythm. Heart sounds: Normal heart sounds. Pulmonary:      Effort: Pulmonary effort is normal.      Comments: Diminished lung sounds bilaterally  Abdominal:      Palpations: Abdomen is soft. Comments: Soft, NTND. No rebound or guarding. Skin:     General: Skin is dry. Neurological:      Mental Status: She is alert. Comments: Confused. No focal deficits. No facial droop. Normal sensory exam.  No drift. No meningeal signs. MDM  Number of Diagnoses or Management Options  Acute UTI: new and requires workup  Altered mental status, unspecified altered mental status type: new and requires workup  Congestive heart failure, unspecified HF chronicity, unspecified heart failure type ([facility] [address]): new and requires workup  Hypervolemia, unspecified hypervolemia type: new and requires workup  Diagnosis management comments: VSS. Afebrile. CT head negative. CBC + CMP unremarkable. ProBNP 2,536. TSH normal limits. UA w/  UTI. Rocephin 1 g IV. Urine cx added on. CXR w/ CHF/volume overload. Lasix 40 mg IV ordered. Ordered ABG. RT attempted to obtain ABG x2 with no success. Hospitalist consulted for admission.        Amount and/or Complexity of Data Reviewed  Clinical lab tests: ordered and reviewed  Tests in the radiology section of CPT®: ordered and reviewed  Tests in the medicine section of CPT®: ordered and reviewed  Review and summarize past medical records: yes  Independent visualization of images, tracings, or specimens: yes    Risk of Complications, Morbidity, and/or Mortality  Presenting problems: high  Diagnostic procedures: moderate  Management options: moderate    Patient Progress  Patient progress: stable    ED Course as of Oct 31 1735   Sat Oct 31, 2020   1654 CXR IMPRESSION:  MILD CONGESTIVE HEART FAILURE.    [DF]   1655 CT head IMPRESSION:  NO ACUTE INTRACRANIAL ABNORMALITY IDENTIFIED AT NONCONTRAST CT.    [DF]      ED Course User Index  [DF] Vicente Delgadillo MD       Procedures      Results Include:    Recent Results (from the past 24 hour(s))   CBC WITH AUTOMATED DIFF    Collection Time: 10/31/20  3:23 PM   Result Value Ref Range    WBC 7.0 4.3 - 11.1 K/uL    RBC 5.11 4.05 - 5.2 M/uL    HGB 16.0 (H) 11.7 - 15.4 g/dL    HCT 52.7 (H) 35.8 - 46.3 %    .1 (H) 79.6 - 97.8 FL    MCH 31.3 26.1 - 32.9 PG    MCHC 30.4 (L) 31.4 - 35.0 g/dL    RDW 15.4 (H) 11.9 - 14.6 %    PLATELET 835 115 - 806 K/uL    MPV 12.4 (H) 9.4 - 12.3 FL    ABSOLUTE NRBC 0.00 0.0 - 0.2 K/uL    DF AUTOMATED      NEUTROPHILS 63 43 - 78 %    LYMPHOCYTES 19 13 - 44 %    MONOCYTES 13 (H) 4.0 - 12.0 %    EOSINOPHILS 4 0.5 - 7.8 %    BASOPHILS 0 0.0 - 2.0 %    IMMATURE GRANULOCYTES 0 0.0 - 5.0 %    ABS. NEUTROPHILS 4.4 1.7 - 8.2 K/UL    ABS. LYMPHOCYTES 1.4 0.5 - 4.6 K/UL    ABS. MONOCYTES 0.9 0.1 - 1.3 K/UL    ABS. EOSINOPHILS 0.3 0.0 - 0.8 K/UL    ABS. BASOPHILS 0.0 0.0 - 0.2 K/UL    ABS. IMM. GRANS. 0.0 0.0 - 0.5 K/UL   METABOLIC PANEL, COMPREHENSIVE    Collection Time: 10/31/20  3:23 PM   Result Value Ref Range    Sodium 142 136 - 145 mmol/L    Potassium 4.9 3.5 - 5.1 mmol/L    Chloride 104 98 - 107 mmol/L    CO2 37 (H) 21 - 32 mmol/L    Anion gap 1 (L) 7 - 16 mmol/L    Glucose 72 65 - 100 mg/dL    BUN 14 8 - 23 MG/DL    Creatinine 0.88 0.6 - 1.0 MG/DL    GFR est AA >60 >60 ml/min/1.73m2    GFR est non-AA >60 >60 ml/min/1.73m2    Calcium 9.1 8.3 - 10.4 MG/DL    Bilirubin, total 0.5 0.2 - 1.1 MG/DL    ALT (SGPT) 22 12 - 65 U/L    AST (SGOT) 23 15 - 37 U/L    Alk.  phosphatase 109 50 - 136 U/L    Protein, total 6.4 6.3 - 8.2 g/dL    Albumin 2.9 (L) 3.2 - 4.6 g/dL    Globulin 3.5 2.3 - 3.5 g/dL    A-G Ratio 0.8 (L) 1.2 - 3.5     NT-PRO BNP    Collection Time: 10/31/20  3:23 PM   Result Value Ref Range    NT pro-BNP 2,536 (H) <450 PG/ML   TSH 3RD GENERATION    Collection Time: 10/31/20  3:23 PM   Result Value Ref Range    TSH 1.030 uIU/mL   LIPASE    Collection Time: 10/31/20  3:23 PM   Result Value Ref Range    Lipase 136 73 - 393 U/L   URINALYSIS W/ RFLX MICROSCOPIC    Collection Time: 10/31/20  4:42 PM   Result Value Ref Range    Color YELLOW      Appearance CLOUDY      Specific gravity 1.014 1.001 - 1.023      pH (UA) 7.0 5.0 - 9.0      Protein TRACE (A) NEG mg/dL    Glucose Negative mg/dL    Ketone Negative NEG mg/dL    Bilirubin Negative NEG      Blood Negative NEG      Urobilinogen 2.0 (H) 0.2 - 1.0 EU/dL    Nitrites Negative NEG      Leukocyte Esterase SMALL (A) NEG      WBC 10-20 0 /hpf    Epithelial cells 0-3 0 /hpf    Bacteria 4+ (H) 0 /hpf          Juan Craven MD; 10/31/2020 @4:04 PM Voice dictation software was used during the making of this note. This software is not perfect and grammatical and other typographical errors may be present.   This note has not been proofread for errors.  ===================================================================

## 2020-11-01 LAB
ALBUMIN SERPL-MCNC: 2.8 G/DL (ref 3.2–4.6)
ALBUMIN/GLOB SERPL: 0.7 {RATIO} (ref 1.2–3.5)
ALP SERPL-CCNC: 103 U/L (ref 50–136)
ALT SERPL-CCNC: 22 U/L (ref 12–65)
ANION GAP SERPL CALC-SCNC: 2 MMOL/L (ref 7–16)
AST SERPL-CCNC: 21 U/L (ref 15–37)
BILIRUB SERPL-MCNC: 0.6 MG/DL (ref 0.2–1.1)
BUN SERPL-MCNC: 12 MG/DL (ref 8–23)
CALCIUM SERPL-MCNC: 8.9 MG/DL (ref 8.3–10.4)
CHLORIDE SERPL-SCNC: 101 MMOL/L (ref 98–107)
CO2 SERPL-SCNC: 38 MMOL/L (ref 21–32)
COVID-19 RAPID TEST, COVR: NOT DETECTED
CREAT SERPL-MCNC: 0.76 MG/DL (ref 0.6–1)
ERYTHROCYTE [DISTWIDTH] IN BLOOD BY AUTOMATED COUNT: 15.6 % (ref 11.9–14.6)
GLOBULIN SER CALC-MCNC: 3.9 G/DL (ref 2.3–3.5)
GLUCOSE SERPL-MCNC: 83 MG/DL (ref 65–100)
HCT VFR BLD AUTO: 53.3 % (ref 35.8–46.3)
HGB BLD-MCNC: 16.1 G/DL (ref 11.7–15.4)
MCH RBC QN AUTO: 31.3 PG (ref 26.1–32.9)
MCHC RBC AUTO-ENTMCNC: 30.2 G/DL (ref 31.4–35)
MCV RBC AUTO: 103.7 FL (ref 79.6–97.8)
NRBC # BLD: 0 K/UL (ref 0–0.2)
PLATELET # BLD AUTO: 152 K/UL (ref 150–450)
PMV BLD AUTO: 12 FL (ref 9.4–12.3)
POTASSIUM SERPL-SCNC: 3.9 MMOL/L (ref 3.5–5.1)
PROT SERPL-MCNC: 6.7 G/DL (ref 6.3–8.2)
RBC # BLD AUTO: 5.14 M/UL (ref 4.05–5.2)
SARS COV-2, XPGCVT: NEGATIVE
SODIUM SERPL-SCNC: 141 MMOL/L (ref 136–145)
SOURCE, COVRS: NORMAL
TROPONIN-HIGH SENSITIVITY: 70.3 PG/ML (ref 0–14)
WBC # BLD AUTO: 7 K/UL (ref 4.3–11.1)

## 2020-11-01 PROCEDURE — 84484 ASSAY OF TROPONIN QUANT: CPT

## 2020-11-01 PROCEDURE — 74011000250 HC RX REV CODE- 250: Performed by: INTERNAL MEDICINE

## 2020-11-01 PROCEDURE — 94664 DEMO&/EVAL PT USE INHALER: CPT

## 2020-11-01 PROCEDURE — 94640 AIRWAY INHALATION TREATMENT: CPT

## 2020-11-01 PROCEDURE — 2709999900 HC NON-CHARGEABLE SUPPLY

## 2020-11-01 PROCEDURE — 74011000258 HC RX REV CODE- 258: Performed by: HOSPITALIST

## 2020-11-01 PROCEDURE — 74011250636 HC RX REV CODE- 250/636: Performed by: HOSPITALIST

## 2020-11-01 PROCEDURE — 77010033678 HC OXYGEN DAILY

## 2020-11-01 PROCEDURE — 94760 N-INVAS EAR/PLS OXIMETRY 1: CPT

## 2020-11-01 PROCEDURE — 74011000250 HC RX REV CODE- 250: Performed by: HOSPITALIST

## 2020-11-01 PROCEDURE — 36415 COLL VENOUS BLD VENIPUNCTURE: CPT

## 2020-11-01 PROCEDURE — 85027 COMPLETE CBC AUTOMATED: CPT

## 2020-11-01 PROCEDURE — 77030013140 HC MSK NEB VYRM -A

## 2020-11-01 PROCEDURE — 74011250637 HC RX REV CODE- 250/637: Performed by: HOSPITALIST

## 2020-11-01 PROCEDURE — 65660000000 HC RM CCU STEPDOWN

## 2020-11-01 PROCEDURE — 80053 COMPREHEN METABOLIC PANEL: CPT

## 2020-11-01 RX ORDER — ALBUTEROL SULFATE 2.5 MG/.5ML
2.5 SOLUTION RESPIRATORY (INHALATION)
Status: DISCONTINUED | OUTPATIENT
Start: 2020-11-01 | End: 2020-11-04 | Stop reason: HOSPADM

## 2020-11-01 RX ORDER — ALBUTEROL SULFATE 90 UG/1
2 AEROSOL, METERED RESPIRATORY (INHALATION)
Status: DISCONTINUED | OUTPATIENT
Start: 2020-11-01 | End: 2020-11-01 | Stop reason: ALTCHOICE

## 2020-11-01 RX ADMIN — VITAMIN D, TAB 1000IU (100/BT) 2 TABLET: 25 TAB at 08:41

## 2020-11-01 RX ADMIN — TRAMADOL HYDROCHLORIDE 50 MG: 50 TABLET, FILM COATED ORAL at 13:17

## 2020-11-01 RX ADMIN — GABAPENTIN 100 MG: 100 CAPSULE ORAL at 16:16

## 2020-11-01 RX ADMIN — VITAMIN D, TAB 1000IU (100/BT) 2 TABLET: 25 TAB at 17:35

## 2020-11-01 RX ADMIN — ENOXAPARIN SODIUM 40 MG: 40 INJECTION SUBCUTANEOUS at 08:43

## 2020-11-01 RX ADMIN — CEFTRIAXONE 1 G: 1 INJECTION, POWDER, FOR SOLUTION INTRAMUSCULAR; INTRAVENOUS at 16:18

## 2020-11-01 RX ADMIN — LATANOPROST 1 DROP: 50 SOLUTION OPHTHALMIC at 17:35

## 2020-11-01 RX ADMIN — TRAMADOL HYDROCHLORIDE 50 MG: 50 TABLET, FILM COATED ORAL at 05:05

## 2020-11-01 RX ADMIN — BUDESONIDE AND FORMOTEROL FUMARATE DIHYDRATE 2 PUFF: 160; 4.5 AEROSOL RESPIRATORY (INHALATION) at 08:01

## 2020-11-01 RX ADMIN — TRAMADOL HYDROCHLORIDE 50 MG: 50 TABLET, FILM COATED ORAL at 22:26

## 2020-11-01 RX ADMIN — FUROSEMIDE 20 MG: 10 INJECTION, SOLUTION INTRAMUSCULAR; INTRAVENOUS at 17:35

## 2020-11-01 RX ADMIN — GABAPENTIN 100 MG: 100 CAPSULE ORAL at 22:26

## 2020-11-01 RX ADMIN — GABAPENTIN 100 MG: 100 CAPSULE ORAL at 08:41

## 2020-11-01 RX ADMIN — BUDESONIDE AND FORMOTEROL FUMARATE DIHYDRATE 2 PUFF: 160; 4.5 AEROSOL RESPIRATORY (INHALATION) at 20:45

## 2020-11-01 RX ADMIN — LOSARTAN POTASSIUM 25 MG: 25 TABLET ORAL at 08:41

## 2020-11-01 RX ADMIN — ALBUTEROL SULFATE 2.5 MG: 2.5 SOLUTION RESPIRATORY (INHALATION) at 20:45

## 2020-11-01 RX ADMIN — FUROSEMIDE 20 MG: 10 INJECTION, SOLUTION INTRAMUSCULAR; INTRAVENOUS at 08:41

## 2020-11-01 RX ADMIN — ATORVASTATIN CALCIUM 10 MG: 10 TABLET, FILM COATED ORAL at 22:26

## 2020-11-01 NOTE — PROGRESS NOTES
Problem: Pressure Injury - Risk of  Goal: *Prevention of pressure injury  Description: Document Tanmay Scale and appropriate interventions in the flowsheet. Outcome: Progressing Towards Goal  Note: Pressure Injury Interventions:  Sensory Interventions: Minimize linen layers, Keep linens dry and wrinkle-free    Moisture Interventions: Minimize layers, Maintain skin hydration (lotion/cream), Limit adult briefs, Moisture barrier    Activity Interventions: Increase time out of bed    Mobility Interventions: HOB 30 degrees or less, Trapeze to reposition, PT/OT evaluation    Nutrition Interventions: Document food/fluid/supplement intake, Offer support with meals,snacks and hydration    Friction and Shear Interventions: Minimize layers, Trapeze to reposition                Problem: Patient Education: Go to Patient Education Activity  Goal: Patient/Family Education  Outcome: Progressing Towards Goal     Problem: Falls - Risk of  Goal: *Absence of Falls  Description: Document Courtney Fall Risk and appropriate interventions in the flowsheet.   Outcome: Progressing Towards Goal  Note: Fall Risk Interventions:  Mobility Interventions: Patient to call before getting OOB, Utilize walker, cane, or other assistive device, PT Consult for mobility concerns    Mentation Interventions: Increase mobility, More frequent rounding    Medication Interventions: Patient to call before getting OOB, Evaluate medications/consider consulting pharmacy, Teach patient to arise slowly    Elimination Interventions: Call light in reach, Patient to call for help with toileting needs              Problem: Patient Education: Go to Patient Education Activity  Goal: Patient/Family Education  Outcome: Progressing Towards Goal

## 2020-11-01 NOTE — PROGRESS NOTES
Progress Note    Patient: Coleman Guerrier MRN: [id number]  SSN: [id number]    YOB: 1940  Age: 79 y.o. Sex: female      Admit Date: 10/31/2020    LOS: 1 day     Subjective:   80 y/o female with hx chronic respiratory failure, on 24/7 O2 at 6L, HTN, DM2, arthritis who presents to ED with family for new onset confusion. Patient seen and examined at bedside. This morning having some SOB, denies chest pain, no abdominal pain, no nausea or vomiting. Objective:     Vitals:    11/01/20 0803 11/01/20 1105 11/01/20 1321 11/01/20 1441   BP:  119/68  126/70   Pulse:  81  78   Resp:  14  14   Temp:  98.1 °F (36.7 °C)  98.4 °F (36.9 °C)   SpO2: 93% 90%  90%   Weight:   95.3 kg (210 lb)    Height:            Intake and Output:  Current Shift: No intake/output data recorded. Last three shifts: No intake/output data recorded.     ROS  10 ROS negative except from stated on subjective    Physical Exam:   General: Alert, oriented, NAD  HEENT: NC/AT, EOM are intact  Neck: supple, no JVD  Cardiovascular: RRR, S1, S2, no murmurs  Respiratory: Wheezes, mild crackles  Abdomen: Soft, NT, ND  Back: No CVA tenderness, no paraspinal tenderness  Extremities: LE without pedal edema, no erythema  Neuro: A&O, CN are intact, no focal deficits  Skin: no rash or ulcers  Psych: good mood and affect    Lab/Data Review:  I have personally reviewed patients laboratory data showing  Recent Results (from the past 24 hour(s))   CBC WITH AUTOMATED DIFF    Collection Time: 10/31/20  3:23 PM   Result Value Ref Range    WBC 7.0 4.3 - 11.1 K/uL    RBC 5.11 4.05 - 5.2 M/uL    HGB 16.0 (H) 11.7 - 15.4 g/dL    HCT 52.7 (H) 35.8 - 46.3 %    .1 (H) 79.6 - 97.8 FL    MCH 31.3 26.1 - 32.9 PG    MCHC 30.4 (L) 31.4 - 35.0 g/dL    RDW 15.4 (H) 11.9 - 14.6 %    PLATELET 274 857 - 177 K/uL    MPV 12.4 (H) 9.4 - 12.3 FL    ABSOLUTE NRBC 0.00 0.0 - 0.2 K/uL    DF AUTOMATED      NEUTROPHILS 63 43 - 78 %    LYMPHOCYTES 19 13 - 44 %    MONOCYTES 13 (H) 4.0 - 12.0 %    EOSINOPHILS 4 0.5 - 7.8 %    BASOPHILS 0 0.0 - 2.0 %    IMMATURE GRANULOCYTES 0 0.0 - 5.0 %    ABS. NEUTROPHILS 4.4 1.7 - 8.2 K/UL    ABS. LYMPHOCYTES 1.4 0.5 - 4.6 K/UL    ABS. MONOCYTES 0.9 0.1 - 1.3 K/UL    ABS. EOSINOPHILS 0.3 0.0 - 0.8 K/UL    ABS. BASOPHILS 0.0 0.0 - 0.2 K/UL    ABS. IMM. GRANS. 0.0 0.0 - 0.5 K/UL   METABOLIC PANEL, COMPREHENSIVE    Collection Time: 10/31/20  3:23 PM   Result Value Ref Range    Sodium 142 136 - 145 mmol/L    Potassium 4.9 3.5 - 5.1 mmol/L    Chloride 104 98 - 107 mmol/L    CO2 37 (H) 21 - 32 mmol/L    Anion gap 1 (L) 7 - 16 mmol/L    Glucose 72 65 - 100 mg/dL    BUN 14 8 - 23 MG/DL    Creatinine 0.88 0.6 - 1.0 MG/DL    GFR est AA >60 >60 ml/min/1.73m2    GFR est non-AA >60 >60 ml/min/1.73m2    Calcium 9.1 8.3 - 10.4 MG/DL    Bilirubin, total 0.5 0.2 - 1.1 MG/DL    ALT (SGPT) 22 12 - 65 U/L    AST (SGOT) 23 15 - 37 U/L    Alk. phosphatase 109 50 - 136 U/L    Protein, total 6.4 6.3 - 8.2 g/dL    Albumin 2.9 (L) 3.2 - 4.6 g/dL    Globulin 3.5 2.3 - 3.5 g/dL    A-G Ratio 0.8 (L) 1.2 - 3.5     NT-PRO BNP    Collection Time: 10/31/20  3:23 PM   Result Value Ref Range    NT pro-BNP 2,536 (H) <450 PG/ML   TSH 3RD GENERATION    Collection Time: 10/31/20  3:23 PM   Result Value Ref Range    TSH 1.030 uIU/mL   LIPASE    Collection Time: 10/31/20  3:23 PM   Result Value Ref Range    Lipase 136 73 - 393 U/L   CULTURE, URINE    Collection Time: 10/31/20  3:42 PM    Specimen: Clean catch; Urine   Result Value Ref Range    Special Requests: NO SPECIAL REQUESTS      Culture result:        NO GROWTH AFTER SHORT PERIOD OF INCUBATION. FURTHER RESULTS TO FOLLOW AFTER OVERNIGHT INCUBATION.    URINALYSIS W/ RFLX MICROSCOPIC    Collection Time: 10/31/20  4:42 PM   Result Value Ref Range    Color YELLOW      Appearance CLOUDY      Specific gravity 1.014 1.001 - 1.023      pH (UA) 7.0 5.0 - 9.0      Protein TRACE (A) NEG mg/dL    Glucose Negative mg/dL    Ketone Negative NEG mg/dL Bilirubin Negative NEG      Blood Negative NEG      Urobilinogen 2.0 (H) 0.2 - 1.0 EU/dL    Nitrites Negative NEG      Leukocyte Esterase SMALL (A) NEG      WBC 10-20 0 /hpf    Epithelial cells 0-3 0 /hpf    Bacteria 4+ (H) 0 /hpf   SARS-COV-2    Collection Time: 10/31/20  5:33 PM   Result Value Ref Range    Specimen source Nasopharyngeal      COVID-19 rapid test Not detected NOTD      SARS CoV-2 Negative Negative     TROPONIN-HIGH SENSITIVITY    Collection Time: 10/31/20  7:38 PM   Result Value Ref Range    Troponin-High Sensitivity 83.1 (H) 0 - 14 pg/mL   LACTIC ACID    Collection Time: 10/31/20  7:38 PM   Result Value Ref Range    Lactic acid 1.0 0.4 - 2.0 MMOL/L   C REACTIVE PROTEIN, QT    Collection Time: 10/31/20  7:38 PM   Result Value Ref Range    C-Reactive protein 7.9 (H) 0.0 - 0.9 mg/dL   MAGNESIUM    Collection Time: 10/31/20  7:38 PM   Result Value Ref Range    Magnesium 1.9 1.8 - 2.4 mg/dL   TROPONIN-HIGH SENSITIVITY    Collection Time: 10/31/20 10:15 PM   Result Value Ref Range    Troponin-High Sensitivity 90.9 (H) 0 - 14 pg/mL   TROPONIN-HIGH SENSITIVITY    Collection Time: 11/01/20  4:28 AM   Result Value Ref Range    Troponin-High Sensitivity 70.3 (H) 0 - 14 pg/mL   CBC W/O DIFF    Collection Time: 11/01/20  4:28 AM   Result Value Ref Range    WBC 7.0 4.3 - 11.1 K/uL    RBC 5.14 4.05 - 5.2 M/uL    HGB 16.1 (H) 11.7 - 15.4 g/dL    HCT 53.3 (H) 35.8 - 46.3 %    .7 (H) 79.6 - 97.8 FL    MCH 31.3 26.1 - 32.9 PG    MCHC 30.2 (L) 31.4 - 35.0 g/dL    RDW 15.6 (H) 11.9 - 14.6 %    PLATELET 828 748 - 723 K/uL    MPV 12.0 9.4 - 12.3 FL    ABSOLUTE NRBC 0.00 0.0 - 0.2 K/uL   METABOLIC PANEL, COMPREHENSIVE    Collection Time: 11/01/20  4:28 AM   Result Value Ref Range    Sodium 141 136 - 145 mmol/L    Potassium 3.9 3.5 - 5.1 mmol/L    Chloride 101 98 - 107 mmol/L    CO2 38 (H) 21 - 32 mmol/L    Anion gap 2 (L) 7 - 16 mmol/L    Glucose 83 65 - 100 mg/dL    BUN 12 8 - 23 MG/DL    Creatinine 0.76 0.6 - 1.0 MG/DL    GFR est AA >60 >60 ml/min/1.73m2    GFR est non-AA >60 >60 ml/min/1.73m2    Calcium 8.9 8.3 - 10.4 MG/DL    Bilirubin, total 0.6 0.2 - 1.1 MG/DL    ALT (SGPT) 22 12 - 65 U/L    AST (SGOT) 21 15 - 37 U/L    Alk. phosphatase 103 50 - 136 U/L    Protein, total 6.7 6.3 - 8.2 g/dL    Albumin 2.8 (L) 3.2 - 4.6 g/dL    Globulin 3.9 (H) 2.3 - 3.5 g/dL    A-G Ratio 0.7 (L) 1.2 - 3.5          Image:  I have personally reviewed patients imaging showing  CT HEAD WO CONT   Final Result   IMPRESSION:     NO ACUTE INTRACRANIAL ABNORMALITY IDENTIFIED AT NONCONTRAST CT.               XR CHEST PORT   Final Result   IMPRESSION:  MILD CONGESTIVE HEART FAILURE. Hospital problems     Principal Problem:    Acute CHF (congestive heart failure) ([facility] [address] (10/31/2020)    Active Problems:    Chronic obstructive lung disease ([facility] [address] (9/18/2014)      Essential hypertension (9/18/2012)      Herpes zoster with ophthalmic complication (6/2/9966)      Obstructive sleep apnea syndrome (10/4/2013)      Post herpetic neuralgia (12/10/2015)      Overview: Last Assessment & Plan:       She is doing well. Lyrica and tegretol are well tolerated and are       affective. Continue medications. Check labs. Follow up in 1 year. Diabetes ([facility] [address] (10/4/2013)      Acute UTI (urinary tract infection) (10/31/2020)      Acute encephalopathy ()        Assessment and Plan:   78 y/o female with hx COPD on 24/7 O2 at 6L, HTN, DM2, arthritis who presents to ED with family for new onset confusion    1. Metabolic encephalopathy concerning of UTI, improved  - CT head no intracranial abnormality  - Continue ceftriaxone  - Follow UCx - NGTD    2. COPD on 6L / Pulm edema concerning of HF  - O therapy to maintain O2 Sat>92%  - Albuterol, symbicort  - Continue lasix  - I&Os  - Daily weights  - TTE    3.  HTN  - Continue losartan     DVT ppx lovenox    I have reviewed, updated, and verified this note's content and spent 38 minutes of my 42 minutes visit performing counseling and coordination of care regarding medical management.      Signed By: Ioana Rosenberg MD     November 1, 2020

## 2020-11-01 NOTE — PROGRESS NOTES
10/31/20 2030   Dual Skin Pressure Injury Assessment   Dual Skin Pressure Injury Assessment X   Second Care Provider (Based on [address] [place] [address]) Jose Cruz RN   Skin Integumentary   Skin Integumentary (WDL) X   Skin Integrity Scars (comment); Other (comment)  (scars on saccrum, mass on patient's back. )

## 2020-11-01 NOTE — H&P
Hospitalist H&P/Consult Note     Admit Date:  10/31/2020  2:37 PM   Name:  Severa Gammons   Age:  79 y.o.  :  1940   MRN:  [id number]   PCP:  Jonah Lawson MD  Treatment Team: Attending Provider: Janna Pascual MD; Primary Nurse: Brandon Long RN; Staff Nurse: Adela Bangura    HPI:   Patient is an 80 y/o female with hx chronic respiratory failure, on 24/7 O2 at 6L, HTN, DM2, arthritis who presents to ED with family for new onset confusion since Wednesday, incontinence, slurred speech, myalgias. Her initial O2 sat was 83% on RA in ED but did not arrive wearing her O2. She denies chest pain or abdominal pain. ED workup positive for UTI with urine small leuk esterase, 10-20 WBC and 4+ bacteria. WBC and lactic acid are normal. BNP 2,536, troponin 83.1 and chest xray with mild CHF. Cannot find a recent echocariogram. She was given IV lasix in ED as well as rocephin for UTI. Non-contrast head CT is negative. Hospitalist service consulted for admission. 10 systems reviewed and negative except as noted in HPI. Past Medical History:   Diagnosis Date    Arthritis     Diabetes ([address]     Hypertension     Shingles       No past surgical history on file. Prior to Admission Medications   Prescriptions Last Dose Informant Patient Reported? Taking? BREO ELLIPTA 100-25 mcg/dose inhaler   Yes No   Sig: INHALE 1 PUFF BY MOUTH EVERY DAY   Cholecalciferol, Vitamin D3, (VITAMIN D3) 2,000 unit cap capsule   No No   Sig: Take 2,000 Units by mouth two (2) times a day. acyclovir (ZOVIRAX) 400 mg tablet   Yes No   Sig: TAKE 1 TABLET BY MOUTH 2 TIMES A DAY   calcipotriene (DOVONEX) 0.005 % topical cream   Yes No   gabapentin (NEURONTIN) 100 mg capsule   No No   Sig: Start one pill daily for 3 days then one pill twice daily for 3 days then one pill three times daily. lidocaine (XYLOCAINE) 5 % ointment   Yes No   losartan (COZAAR) 25 mg tablet   Yes No   Sig: Take  by mouth daily.    lovastatin (MEVACOR) 40 mg tablet   Yes No   Sig: Take 40 mg by mouth nightly. nateglinide (STARLIX) 60 mg tablet   Yes No   Sig: Take 60 mg by mouth Before breakfast, lunch, and dinner. traMADol (ULTRAM) 50 mg tablet   No No   Sig: Take 1 Tab by mouth two (2) times daily as needed for Pain. Max Daily Amount: 100 mg. Indications: NEUROPATHIC PAIN   traMADol (ULTRAM) 50 mg tablet   No No   Sig: Take 1 Tab by mouth every eight (8) hours as needed for Pain. Max Daily Amount: 150 mg.   travoprost (TRAVATAN Z) 0.004 % ophthalmic solution   Yes No   Sig: Administer 1 Drop to both eyes every evening. Facility-Administered Medications: None     Allergies   Allergen Reactions    Chocolate Flavor Angioedema    Triamcinolone Acetonide Nausea Only      Social History     Tobacco Use    Smoking status: Current Some Day Smoker    Smokeless tobacco: Never Used   Substance Use Topics    Alcohol use: No      No family history on file. There is no immunization history on file for this patient. Objective:     Patient Vitals for the past 24 hrs:   Temp Pulse Resp BP SpO2   10/31/20 2331 98.6 °F (37 °C) 85 14 123/69 93 %   10/31/20 2000 98 °F (36.7 °C) 81 18 113/65 90 %   10/31/20 1655     98 %   10/31/20 1502     99 %   10/31/20 1447    (!) 167/88 95 %   10/31/20 1446 98.3 °F (36.8 °C) 72 24 (!) 167/88 (!) 83 %     Oxygen Therapy  O2 Sat (%): 93 % (10/31/20 2331)  Pulse via Oximetry: 71 beats per minute (10/31/20 1655)  O2 Device: Room air (10/31/20 1446)  No intake or output data in the 24 hours ending 11/01/20 0014    Physical Exam:  General:    Well nourished. Alert. On NC O2   Eyes:   Normal sclera. Extraocular movements intact. ENT:  Normocephalic, atraumatic. Moist mucous membranes  CV:   RRR. No murmur, rub, or gallop. Lungs:  Bilateral crackles  Abdomen: Soft, nontender, nondistended. Bowel sounds normal.   Extremities: Warm and dry. No cyanosis or edema. Neurologic: CN II-XII grossly intact. Sensation intact. Skin:     No rashes or jaundice. No wounds. Psych:  Normal mood and affect. I reviewed the labs, imaging, EKGs, telemetry, and other studies done this admission. Data Review:   Recent Results (from the past 24 hour(s))   CBC WITH AUTOMATED DIFF    Collection Time: 10/31/20  3:23 PM   Result Value Ref Range    WBC 7.0 4.3 - 11.1 K/uL    RBC 5.11 4.05 - 5.2 M/uL    HGB 16.0 (H) 11.7 - 15.4 g/dL    HCT 52.7 (H) 35.8 - 46.3 %    .1 (H) 79.6 - 97.8 FL    MCH 31.3 26.1 - 32.9 PG    MCHC 30.4 (L) 31.4 - 35.0 g/dL    RDW 15.4 (H) 11.9 - 14.6 %    PLATELET 096 259 - 831 K/uL    MPV 12.4 (H) 9.4 - 12.3 FL    ABSOLUTE NRBC 0.00 0.0 - 0.2 K/uL    DF AUTOMATED      NEUTROPHILS 63 43 - 78 %    LYMPHOCYTES 19 13 - 44 %    MONOCYTES 13 (H) 4.0 - 12.0 %    EOSINOPHILS 4 0.5 - 7.8 %    BASOPHILS 0 0.0 - 2.0 %    IMMATURE GRANULOCYTES 0 0.0 - 5.0 %    ABS. NEUTROPHILS 4.4 1.7 - 8.2 K/UL    ABS. LYMPHOCYTES 1.4 0.5 - 4.6 K/UL    ABS. MONOCYTES 0.9 0.1 - 1.3 K/UL    ABS. EOSINOPHILS 0.3 0.0 - 0.8 K/UL    ABS. BASOPHILS 0.0 0.0 - 0.2 K/UL    ABS. IMM. GRANS. 0.0 0.0 - 0.5 K/UL   METABOLIC PANEL, COMPREHENSIVE    Collection Time: 10/31/20  3:23 PM   Result Value Ref Range    Sodium 142 136 - 145 mmol/L    Potassium 4.9 3.5 - 5.1 mmol/L    Chloride 104 98 - 107 mmol/L    CO2 37 (H) 21 - 32 mmol/L    Anion gap 1 (L) 7 - 16 mmol/L    Glucose 72 65 - 100 mg/dL    BUN 14 8 - 23 MG/DL    Creatinine 0.88 0.6 - 1.0 MG/DL    GFR est AA >60 >60 ml/min/1.73m2    GFR est non-AA >60 >60 ml/min/1.73m2    Calcium 9.1 8.3 - 10.4 MG/DL    Bilirubin, total 0.5 0.2 - 1.1 MG/DL    ALT (SGPT) 22 12 - 65 U/L    AST (SGOT) 23 15 - 37 U/L    Alk.  phosphatase 109 50 - 136 U/L    Protein, total 6.4 6.3 - 8.2 g/dL    Albumin 2.9 (L) 3.2 - 4.6 g/dL    Globulin 3.5 2.3 - 3.5 g/dL    A-G Ratio 0.8 (L) 1.2 - 3.5     NT-PRO BNP    Collection Time: 10/31/20  3:23 PM   Result Value Ref Range    NT pro-BNP 2,536 (H) <450 PG/ML   TSH 3RD GENERATION    Collection Time: 10/31/20  3:23 PM   Result Value Ref Range    TSH 1.030 uIU/mL   LIPASE    Collection Time: 10/31/20  3:23 PM   Result Value Ref Range    Lipase 136 73 - 393 U/L   URINALYSIS W/ RFLX MICROSCOPIC    Collection Time: 10/31/20  4:42 PM   Result Value Ref Range    Color YELLOW      Appearance CLOUDY      Specific gravity 1.014 1.001 - 1.023      pH (UA) 7.0 5.0 - 9.0      Protein TRACE (A) NEG mg/dL    Glucose Negative mg/dL    Ketone Negative NEG mg/dL    Bilirubin Negative NEG      Blood Negative NEG      Urobilinogen 2.0 (H) 0.2 - 1.0 EU/dL    Nitrites Negative NEG      Leukocyte Esterase SMALL (A) NEG      WBC 10-20 0 /hpf    Epithelial cells 0-3 0 /hpf    Bacteria 4+ (H) 0 /hpf   SARS-COV-2    Collection Time: 10/31/20  6:33 PM   Result Value Ref Range    Specimen source Nasopharyngeal      COVID-19 rapid test Not detected NOTD      SARS CoV-2 PENDING    TROPONIN-HIGH SENSITIVITY    Collection Time: 10/31/20  7:38 PM   Result Value Ref Range    Troponin-High Sensitivity 83.1 (H) 0 - 14 pg/mL   LACTIC ACID    Collection Time: 10/31/20  7:38 PM   Result Value Ref Range    Lactic acid 1.0 0.4 - 2.0 MMOL/L   C REACTIVE PROTEIN, QT    Collection Time: 10/31/20  7:38 PM   Result Value Ref Range    C-Reactive protein 7.9 (H) 0.0 - 0.9 mg/dL   MAGNESIUM    Collection Time: 10/31/20  7:38 PM   Result Value Ref Range    Magnesium 1.9 1.8 - 2.4 mg/dL   TROPONIN-HIGH SENSITIVITY    Collection Time: 10/31/20 10:15 PM   Result Value Ref Range    Troponin-High Sensitivity 90.9 (H) 0 - 14 pg/mL       Imaging Studies:  CXR Results  (Last 48 hours)               10/31/20 1618  XR CHEST PORT Final result    Impression:  IMPRESSION:  MILD CONGESTIVE HEART FAILURE. Narrative:  PORTABLE CHEST, October 31, 2020 at 1612 hours       CLINICAL HISTORY:  Decreased level of consciousness. Now with hypoxemia. COMPARISON:  August 31, 2018.        FINDINGS:  AP erect image demonstrates no confluent infiltrate or significant   pleural fluid. There is moderate cardiomegaly with mild pulmonary venous   congestion and perihilar edema consistent with mild congestive heart failure. No definite pneumothorax. The bony thorax appears intact on this view. There   are overlying radiopaque support devices. CT Results  (Last 48 hours)               10/31/20 1637  CT HEAD WO CONT Final result    Impression:  IMPRESSION:     NO ACUTE INTRACRANIAL ABNORMALITY IDENTIFIED AT NONCONTRAST CT. Narrative:  NONCONTRAST HEAD CT       CLINICAL HISTORY:  Decreased level of consciousness for 3 days. TECHNIQUE:  Axial images were obtained with spiral technique. Radiation dose   reduction was achieved using one or all of the following techniques: automated   exposure control, weight-based dosing, iterative reconstruction. COMPARISON:  None. REPORT:   Standard noncontrast head CT demonstrates no definite intracranial   mass effect, hemorrhage, or evidence of acute geographic infarction. The   ventricles are normal in size and configuration, accounting for the patient's   age. Orbits  and paranasal sinuses are clear where imaged. Bone windows   demonstrate no definite fracture or destruction. Assessment and Plan:     Hospital Problems as of 11/1/2020 Date Reviewed: 8/31/2018          Codes Class Noted - Resolved POA    * (Principal) Acute CHF (congestive heart failure) ([facility] ICD-10-CM: I50.9  ICD-9-CM: 428.0  10/31/2020 - Present Yes        Acute UTI (urinary tract infection) ICD-10-CM: N39.0  ICD-9-CM: 599.0  10/31/2020 - Present Yes        Acute encephalopathy ICD-10-CM: G93.40  ICD-9-CM: 348.30  Unknown - Present Yes        Post herpetic neuralgia ICD-10-CM: B02.29  ICD-9-CM: 053.19  12/10/2015 - Present Yes    Overview Signed 6/15/2018 10:53 AM by Aftab Murphy MD     Last Assessment & Plan:   She is doing well.  Lyrica and tegretol are well tolerated and are affective. Continue medications. Check labs. Follow up in 1 year. Chronic obstructive lung disease ([facility] ICD-10-CM: J44.9  ICD-9-CM: 077  9/18/2014 - Present Yes        Obstructive sleep apnea syndrome ICD-10-CM: G47.33  ICD-9-CM: 327.23  10/4/2013 - Present Yes        Diabetes ([facility] ICD-10-CM: E11.9  ICD-9-CM: 250.00  10/4/2013 - Present Yes        Herpes zoster with ophthalmic complication [id number]: R29.26  ICD-9-CM: 053.29  9/3/2013 - Present Yes        Essential hypertension ICD-10-CM: I10  ICD-9-CM: 401.9  9/18/2012 - Present Yes              PLAN:  · Admit inpatient to remote telemetry  · She has chronic respiratory failure with 24/7 NC O2 6L  · Suspect patient's acute encephalopathy secondary to acute UTI  · Continue IV rocephin 1 G Q 24 hours  · Follow-up urine and blood cultures. Check CRP  · Patient's chest xray with mild CHF. Pro-BNP 2,536  · I cannot find documentation of prior hx CHF or echocardiogram  · Continue IV lasix. Daily weights and ins and outs  · Echocardiogram in am  · Serial troponin to r/o silent ischemia  · Continue other home medications  · She is on neurontin and ultram for post-herpetic neuralgia  · Holding diabetic medications with low glucose at admission (72)  · SQ lovenox for DVT prophylaxis.  Incentive spirometry  · To return home with family when medically stable      Estimated LOS:  2 midnights    Signed:  Juels Dumas MD

## 2020-11-01 NOTE — PROGRESS NOTES
Care Management Interventions  PCP Verified by CM: Yes  Transition of Care Consult (CM Consult): [address]: Yes  Current Support Network: [facility] for Transition of Care is Related to the Following Treatment Goals : Increase strength  The Patient and/or Patient Representative was Provided with a Choice of Provider and Agrees with the Discharge Plan?: Yes  Freedom of Choice List was Provided with Basic Dialogue that Supports the Patient's Individualized Plan of Care/Goals, Treatment Preferences and Shares the Quality Data Associated with the Providers?: Yes  Discharge Location  Discharge Placement: Home with home health    79 yr-old F with hx CRF on 24/7 O2 at 6L. Came to ED with confusion. Dx CHF, UTI. Lives at home with son who is there 24/7. Has O2, walker, BSC & WC at home. Will follow for discharge planning.

## 2020-11-01 NOTE — PROGRESS NOTES
Shift assessment complete. Patient resting in bed quietly. Patient is alert and oriented x4, bowel sounds present in all 4 quadrants, pedal and radial pulses present and palpable bilaterally, s1 and s2 heard on auscultation, breaths equal and even. Pt. On 5L oxygen via NC. IV site clean, dry, intact, capped. Skin is dry. Brief, linen, gown changed. All other needs met at this time. Safety measures in place. Bed low and locked, side rails up x3, gripper socks on, call light, side table, and personal belongings within reach. Instructed patient to call for bathroom assistance and when getting out of bed.

## 2020-11-02 LAB
ANION GAP SERPL CALC-SCNC: 1 MMOL/L (ref 7–16)
BASOPHILS # BLD: 0 K/UL (ref 0–0.2)
BASOPHILS NFR BLD: 1 % (ref 0–2)
BUN SERPL-MCNC: 15 MG/DL (ref 8–23)
CALCIUM SERPL-MCNC: 8.7 MG/DL (ref 8.3–10.4)
CHLORIDE SERPL-SCNC: 97 MMOL/L (ref 98–107)
CO2 SERPL-SCNC: 41 MMOL/L (ref 21–32)
CREAT SERPL-MCNC: 0.91 MG/DL (ref 0.6–1)
DIFFERENTIAL METHOD BLD: ABNORMAL
EOSINOPHIL # BLD: 0.3 K/UL (ref 0–0.8)
EOSINOPHIL NFR BLD: 4 % (ref 0.5–7.8)
ERYTHROCYTE [DISTWIDTH] IN BLOOD BY AUTOMATED COUNT: 15.8 % (ref 11.9–14.6)
GLUCOSE SERPL-MCNC: 93 MG/DL (ref 65–100)
HCT VFR BLD AUTO: 54.7 % (ref 35.8–46.3)
HGB BLD-MCNC: 16 G/DL (ref 11.7–15.4)
IMM GRANULOCYTES # BLD AUTO: 0 K/UL (ref 0–0.5)
IMM GRANULOCYTES NFR BLD AUTO: 1 % (ref 0–5)
LYMPHOCYTES # BLD: 1.7 K/UL (ref 0.5–4.6)
LYMPHOCYTES NFR BLD: 27 % (ref 13–44)
MCH RBC QN AUTO: 31.1 PG (ref 26.1–32.9)
MCHC RBC AUTO-ENTMCNC: 29.3 G/DL (ref 31.4–35)
MCV RBC AUTO: 106.2 FL (ref 79.6–97.8)
MONOCYTES # BLD: 1.1 K/UL (ref 0.1–1.3)
MONOCYTES NFR BLD: 18 % (ref 4–12)
NEUTS SEG # BLD: 3.1 K/UL (ref 1.7–8.2)
NEUTS SEG NFR BLD: 50 % (ref 43–78)
NRBC # BLD: 0 K/UL (ref 0–0.2)
PLATELET # BLD AUTO: 125 K/UL (ref 150–450)
PMV BLD AUTO: 10.9 FL (ref 9.4–12.3)
POTASSIUM SERPL-SCNC: 4.1 MMOL/L (ref 3.5–5.1)
RBC # BLD AUTO: 5.15 M/UL (ref 4.05–5.2)
SODIUM SERPL-SCNC: 139 MMOL/L (ref 136–145)
WBC # BLD AUTO: 6.2 K/UL (ref 4.3–11.1)

## 2020-11-02 PROCEDURE — 74011000258 HC RX REV CODE- 258: Performed by: HOSPITALIST

## 2020-11-02 PROCEDURE — 74011250636 HC RX REV CODE- 250/636: Performed by: INTERNAL MEDICINE

## 2020-11-02 PROCEDURE — 94640 AIRWAY INHALATION TREATMENT: CPT

## 2020-11-02 PROCEDURE — 74011250637 HC RX REV CODE- 250/637: Performed by: HOSPITALIST

## 2020-11-02 PROCEDURE — 36415 COLL VENOUS BLD VENIPUNCTURE: CPT

## 2020-11-02 PROCEDURE — 77010033678 HC OXYGEN DAILY

## 2020-11-02 PROCEDURE — 74011000250 HC RX REV CODE- 250: Performed by: INTERNAL MEDICINE

## 2020-11-02 PROCEDURE — C8929 TTE W OR WO FOL WCON,DOPPLER: HCPCS

## 2020-11-02 PROCEDURE — 65660000000 HC RM CCU STEPDOWN

## 2020-11-02 PROCEDURE — 90471 IMMUNIZATION ADMIN: CPT

## 2020-11-02 PROCEDURE — 74011000250 HC RX REV CODE- 250: Performed by: HOSPITALIST

## 2020-11-02 PROCEDURE — 74011250636 HC RX REV CODE- 250/636: Performed by: HOSPITALIST

## 2020-11-02 PROCEDURE — 94760 N-INVAS EAR/PLS OXIMETRY 1: CPT

## 2020-11-02 PROCEDURE — 90686 IIV4 VACC NO PRSV 0.5 ML IM: CPT | Performed by: INTERNAL MEDICINE

## 2020-11-02 PROCEDURE — 85025 COMPLETE CBC W/AUTO DIFF WBC: CPT

## 2020-11-02 PROCEDURE — 80048 BASIC METABOLIC PNL TOTAL CA: CPT

## 2020-11-02 PROCEDURE — 74011250637 HC RX REV CODE- 250/637: Performed by: INTERNAL MEDICINE

## 2020-11-02 RX ORDER — ACETAZOLAMIDE 250 MG/1
250 TABLET ORAL ONCE
Status: COMPLETED | OUTPATIENT
Start: 2020-11-02 | End: 2020-11-02

## 2020-11-02 RX ORDER — FUROSEMIDE 10 MG/ML
20 INJECTION INTRAMUSCULAR; INTRAVENOUS DAILY
Status: DISCONTINUED | OUTPATIENT
Start: 2020-11-02 | End: 2020-11-03

## 2020-11-02 RX ADMIN — CEFTRIAXONE 1 G: 1 INJECTION, POWDER, FOR SOLUTION INTRAMUSCULAR; INTRAVENOUS at 17:05

## 2020-11-02 RX ADMIN — ALBUTEROL SULFATE 2.5 MG: 2.5 SOLUTION RESPIRATORY (INHALATION) at 01:31

## 2020-11-02 RX ADMIN — ATORVASTATIN CALCIUM 10 MG: 10 TABLET, FILM COATED ORAL at 23:32

## 2020-11-02 RX ADMIN — ALBUTEROL SULFATE 2.5 MG: 2.5 SOLUTION RESPIRATORY (INHALATION) at 07:58

## 2020-11-02 RX ADMIN — GABAPENTIN 100 MG: 100 CAPSULE ORAL at 17:05

## 2020-11-02 RX ADMIN — GABAPENTIN 100 MG: 100 CAPSULE ORAL at 23:32

## 2020-11-02 RX ADMIN — BUDESONIDE AND FORMOTEROL FUMARATE DIHYDRATE 2 PUFF: 160; 4.5 AEROSOL RESPIRATORY (INHALATION) at 21:42

## 2020-11-02 RX ADMIN — ALBUTEROL SULFATE 2.5 MG: 2.5 SOLUTION RESPIRATORY (INHALATION) at 21:41

## 2020-11-02 RX ADMIN — ALBUTEROL SULFATE 2.5 MG: 2.5 SOLUTION RESPIRATORY (INHALATION) at 14:28

## 2020-11-02 RX ADMIN — ACETAMINOPHEN 650 MG: 325 TABLET, FILM COATED ORAL at 02:43

## 2020-11-02 RX ADMIN — VITAMIN D, TAB 1000IU (100/BT) 2 TABLET: 25 TAB at 17:05

## 2020-11-02 RX ADMIN — FUROSEMIDE 20 MG: 10 INJECTION, SOLUTION INTRAMUSCULAR; INTRAVENOUS at 09:03

## 2020-11-02 RX ADMIN — ACETAMINOPHEN 650 MG: 325 TABLET, FILM COATED ORAL at 12:00

## 2020-11-02 RX ADMIN — ACETAZOLAMIDE 250 MG: 250 TABLET ORAL at 09:02

## 2020-11-02 RX ADMIN — GABAPENTIN 100 MG: 100 CAPSULE ORAL at 09:01

## 2020-11-02 RX ADMIN — PERFLUTREN 1 ML: 6.52 INJECTION, SUSPENSION INTRAVENOUS at 08:45

## 2020-11-02 RX ADMIN — TRAMADOL HYDROCHLORIDE 50 MG: 50 TABLET, FILM COATED ORAL at 15:19

## 2020-11-02 RX ADMIN — VITAMIN D, TAB 1000IU (100/BT) 2 TABLET: 25 TAB at 09:01

## 2020-11-02 RX ADMIN — BUDESONIDE AND FORMOTEROL FUMARATE DIHYDRATE 2 PUFF: 160; 4.5 AEROSOL RESPIRATORY (INHALATION) at 07:58

## 2020-11-02 RX ADMIN — LOSARTAN POTASSIUM 25 MG: 25 TABLET ORAL at 09:02

## 2020-11-02 RX ADMIN — ENOXAPARIN SODIUM 40 MG: 40 INJECTION SUBCUTANEOUS at 09:01

## 2020-11-02 RX ADMIN — INFLUENZA VIRUS VACCINE 0.5 ML: 15; 15; 15; 15 SUSPENSION INTRAMUSCULAR at 09:15

## 2020-11-02 RX ADMIN — LATANOPROST 1 DROP: 50 SOLUTION OPHTHALMIC at 17:05

## 2020-11-02 NOTE — PROGRESS NOTES
Shift assessment completed. Alert and oriented x4. Pt sitting up in bed. Respirations even and unlabored. Lung sounds wet bilaterally. Pt on 6L nasal cannula. Oxygen saturation 88%. Respiratory notified. S1 and S2 heard. Skin is dry and scars noted. No areas of skin breakdown. Musculoskeletal weakness in the upper and lower extremities. Purewick in place. Patent and attached to suction. Canister emptied at [address] [place] [address]. Urine palak, clear, and non-malodorous. [facility] is patent, clean, dry, and intact. Currently on cardiac telemetry, box [id number]. Bed locked, in lowest position, call light within reach.

## 2020-11-02 NOTE — PROGRESS NOTES
Problem: Pressure Injury - Risk of  Goal: *Prevention of pressure injury  Description: Document Tanmay Scale and appropriate interventions in the flowsheet. Outcome: Progressing Towards Goal  Note: Pressure Injury Interventions:  Sensory Interventions: Assess changes in LOC, Avoid rigorous massage over bony prominences, Check visual cues for pain    Moisture Interventions: Limit adult briefs, Internal/External urinary devices, Maintain skin hydration (lotion/cream), Minimize layers    Activity Interventions: Increase time out of bed    Mobility Interventions: HOB 30 degrees or less, PT/OT evaluation    Nutrition Interventions: Offer support with meals,snacks and hydration, Document food/fluid/supplement intake    Friction and Shear Interventions: Apply protective barrier, creams and emollients                Problem: Patient Education: Go to Patient Education Activity  Goal: Patient/Family Education  Outcome: Progressing Towards Goal     Problem: Falls - Risk of  Goal: *Absence of Falls  Description: Document Courtney Fall Risk and appropriate interventions in the flowsheet.   Outcome: Progressing Towards Goal  Note: Fall Risk Interventions:  Mobility Interventions: Patient to call before getting OOB, Utilize walker, cane, or other assistive device    Mentation Interventions: Increase mobility, More frequent rounding    Medication Interventions: Patient to call before getting OOB, Teach patient to arise slowly    Elimination Interventions: Call light in reach, Patient to call for help with toileting needs    History of Falls Interventions: Evaluate medications/consider consulting pharmacy         Problem: Patient Education: Go to Patient Education Activity  Goal: Patient/Family Education  Outcome: Progressing Towards Goal

## 2020-11-02 NOTE — PROGRESS NOTES
Shift assessment complete. Patient resting in bed quietly watching TV. Patient is alert and oriented x4, bowel sounds present in all 4 quadrants, pedal and radial pulses present and palpable bilaterally, s1 and s2 heard on auscultation, lung sounds wet bilaterally, respirations unlabored and breathing. Pt. has wet cough. IV site clean, dry, intact, flushed, capped. Pt. Has incontinence brief on with purewick cath. All other needs met at this time. Safety measures in place. Bed low and locked, side rails up x3, gripper socks on, call light, side table, and personal belongings within reach. Instructed patient to call for bathroom assistance and when getting out of bed.

## 2020-11-02 NOTE — PROGRESS NOTES
Progress Note    Patient: Martha Salas MRN: [id number]  SSN: [id number]    YOB: 1940  Age: 79 y.o. Sex: female      Admit Date: 10/31/2020    LOS: 2 days     Subjective:   78 y/o female with hx chronic respiratory failure, on 24/7 O2 at 6L, HTN, DM2, arthritis who presents to ED with family for new onset confusion. Patient seen and examined at bedside. This morning having some SOB, denies chest pain, no abdominal pain, no nausea or vomiting.      Objective:     Vitals:    11/02/20 0901 11/02/20 1112 11/02/20 1432 11/02/20 1502   BP:  (!) 106/50  121/64   Pulse: 89 76  76   Resp:  16  15   Temp:  98.7 °F (37.1 °C)  97.7 °F (36.5 °C)   SpO2:  91% 93% 92%   Weight:       Height:            Intake and Output:  Current Shift: 11/02 0701 - 11/02 1900  In: -   Out: 60 [Urine:60]  Last three shifts: 10/31 1901 - 11/02 0700  In: -   Out: 1000 [Urine:1000]    ROS  10 ROS negative except from stated on subjective    Physical Exam:   General: Alert, oriented, NAD  HEENT: NC/AT, EOM are intact  Neck: supple, no JVD  Cardiovascular: RRR, S1, S2, no murmurs  Respiratory: Wheezes, mild crackles  Abdomen: Soft, NT, ND  Back: No CVA tenderness, no paraspinal tenderness  Extremities: LE without pedal edema, no erythema  Neuro: A&O, CN are intact, no focal deficits  Skin: no rash or ulcers  Psych: good mood and affect    Lab/Data Review:  I have personally reviewed patients laboratory data showing  Recent Results (from the past 24 hour(s))   CBC WITH AUTOMATED DIFF    Collection Time: 11/02/20  5:03 AM   Result Value Ref Range    WBC 6.2 4.3 - 11.1 K/uL    RBC 5.15 4.05 - 5.2 M/uL    HGB 16.0 (H) 11.7 - 15.4 g/dL    HCT 54.7 (H) 35.8 - 46.3 %    .2 (H) 79.6 - 97.8 FL    MCH 31.1 26.1 - 32.9 PG    MCHC 29.3 (L) 31.4 - 35.0 g/dL    RDW 15.8 (H) 11.9 - 14.6 %    PLATELET 814 (L) 184 - 450 K/uL    MPV 10.9 9.4 - 12.3 FL    ABSOLUTE NRBC 0.00 0.0 - 0.2 K/uL    DF AUTOMATED      NEUTROPHILS 50 43 - 78 % LYMPHOCYTES 27 13 - 44 %    MONOCYTES 18 (H) 4.0 - 12.0 %    EOSINOPHILS 4 0.5 - 7.8 %    BASOPHILS 1 0.0 - 2.0 %    IMMATURE GRANULOCYTES 1 0.0 - 5.0 %    ABS. NEUTROPHILS 3.1 1.7 - 8.2 K/UL    ABS. LYMPHOCYTES 1.7 0.5 - 4.6 K/UL    ABS. MONOCYTES 1.1 0.1 - 1.3 K/UL    ABS. EOSINOPHILS 0.3 0.0 - 0.8 K/UL    ABS. BASOPHILS 0.0 0.0 - 0.2 K/UL    ABS. IMM. GRANS. 0.0 0.0 - 0.5 K/UL   METABOLIC PANEL, BASIC    Collection Time: 11/02/20  6:42 AM   Result Value Ref Range    Sodium 139 136 - 145 mmol/L    Potassium 4.1 3.5 - 5.1 mmol/L    Chloride 97 (L) 98 - 107 mmol/L    CO2 41 (HH) 21 - 32 mmol/L    Anion gap 1 (L) 7 - 16 mmol/L    Glucose 93 65 - 100 mg/dL    BUN 15 8 - 23 MG/DL    Creatinine 0.91 0.6 - 1.0 MG/DL    GFR est AA >60 >60 ml/min/1.73m2    GFR est non-AA >60 >60 ml/min/1.73m2    Calcium 8.7 8.3 - 10.4 MG/DL        Image:  I have personally reviewed patients imaging showing  CT HEAD WO CONT   Final Result   IMPRESSION:     NO ACUTE INTRACRANIAL ABNORMALITY IDENTIFIED AT NONCONTRAST CT.               XR CHEST PORT   Final Result   IMPRESSION:  MILD CONGESTIVE HEART FAILURE. Hospital problems     Principal Problem:    Acute CHF (congestive heart failure) ([facility] (10/31/2020)    Active Problems:    Chronic obstructive lung disease ([facility] (9/18/2014)      Essential hypertension (9/18/2012)      Herpes zoster with ophthalmic complication (9/4/3674)      Obstructive sleep apnea syndrome (10/4/2013)      Post herpetic neuralgia (12/10/2015)      Overview: Last Assessment & Plan:       She is doing well. Lyrica and tegretol are well tolerated and are       affective. Continue medications. Check labs. Follow up in 1 year. Diabetes ([facility] (10/4/2013)      Acute UTI (urinary tract infection) (10/31/2020)      Acute encephalopathy ()        Assessment and Plan:   78 y/o female with hx COPD on 24/7 O2 at 6L, HTN, DM2, arthritis who presents to ED with family for new onset confusion    1.  Metabolic encephalopathy concerning of UTI, improved  - CT head no intracranial abnormality  - Continue ceftriaxone  - Follow UCx - GNR    2. COPD on 6L / Pulm edema concerning of HF  - O therapy to maintain O2 Sat>92%  - Albuterol, symbicort  - Continue lasix  - I&Os  - Daily weights  - TTE    3. HTN  - Continue losartan     DVT ppx lovenox    I have reviewed, updated, and verified this note's content and spent 38 minutes of my 42 minutes visit performing counseling and coordination of care regarding medical management.      Signed By: Juan Miguel Nunez MD     November 2, 2020

## 2020-11-03 ENCOUNTER — APPOINTMENT (OUTPATIENT)
Dept: GENERAL RADIOLOGY | Age: 80
DRG: 689 | End: 2020-11-03
Attending: FAMILY MEDICINE
Payer: MEDICARE

## 2020-11-03 ENCOUNTER — HOME HEALTH ADMISSION (OUTPATIENT)
Dept: HOME HEALTH SERVICES | Facility: HOME HEALTH | Age: 80
End: 2020-11-03

## 2020-11-03 PROBLEM — D75.1 POLYCYTHEMIA SECONDARY TO HYPOXIA: Status: ACTIVE | Noted: 2020-11-03

## 2020-11-03 LAB
ANION GAP SERPL CALC-SCNC: 3 MMOL/L (ref 7–16)
BASOPHILS # BLD: 0 K/UL (ref 0–0.2)
BASOPHILS NFR BLD: 1 % (ref 0–2)
BUN SERPL-MCNC: 21 MG/DL (ref 8–23)
CALCIUM SERPL-MCNC: 8.8 MG/DL (ref 8.3–10.4)
CHLORIDE SERPL-SCNC: 100 MMOL/L (ref 98–107)
CO2 SERPL-SCNC: 36 MMOL/L (ref 21–32)
CREAT SERPL-MCNC: 0.99 MG/DL (ref 0.6–1)
DIFFERENTIAL METHOD BLD: ABNORMAL
EOSINOPHIL # BLD: 0.3 K/UL (ref 0–0.8)
EOSINOPHIL NFR BLD: 5 % (ref 0.5–7.8)
ERYTHROCYTE [DISTWIDTH] IN BLOOD BY AUTOMATED COUNT: 15.5 % (ref 11.9–14.6)
GLUCOSE BLD STRIP.AUTO-MCNC: 187 MG/DL (ref 65–100)
GLUCOSE BLD STRIP.AUTO-MCNC: 99 MG/DL (ref 65–100)
GLUCOSE SERPL-MCNC: 86 MG/DL (ref 65–100)
HCT VFR BLD AUTO: 52.7 % (ref 35.8–46.3)
HGB BLD-MCNC: 15.9 G/DL (ref 11.7–15.4)
IMM GRANULOCYTES # BLD AUTO: 0 K/UL (ref 0–0.5)
IMM GRANULOCYTES NFR BLD AUTO: 0 % (ref 0–5)
LYMPHOCYTES # BLD: 1.4 K/UL (ref 0.5–4.6)
LYMPHOCYTES NFR BLD: 23 % (ref 13–44)
MCH RBC QN AUTO: 31.1 PG (ref 26.1–32.9)
MCHC RBC AUTO-ENTMCNC: 30.2 G/DL (ref 31.4–35)
MCV RBC AUTO: 103.1 FL (ref 79.6–97.8)
MONOCYTES # BLD: 0.8 K/UL (ref 0.1–1.3)
MONOCYTES NFR BLD: 13 % (ref 4–12)
NEUTS SEG # BLD: 3.6 K/UL (ref 1.7–8.2)
NEUTS SEG NFR BLD: 57 % (ref 43–78)
NRBC # BLD: 0 K/UL (ref 0–0.2)
PLATELET # BLD AUTO: 153 K/UL (ref 150–450)
PMV BLD AUTO: 12.3 FL (ref 9.4–12.3)
POTASSIUM SERPL-SCNC: 4.1 MMOL/L (ref 3.5–5.1)
RBC # BLD AUTO: 5.11 M/UL (ref 4.05–5.2)
SODIUM SERPL-SCNC: 139 MMOL/L (ref 136–145)
WBC # BLD AUTO: 6.2 K/UL (ref 4.3–11.1)

## 2020-11-03 PROCEDURE — 74011000258 HC RX REV CODE- 258: Performed by: HOSPITALIST

## 2020-11-03 PROCEDURE — 74011250637 HC RX REV CODE- 250/637: Performed by: HOSPITALIST

## 2020-11-03 PROCEDURE — 71045 X-RAY EXAM CHEST 1 VIEW: CPT

## 2020-11-03 PROCEDURE — 94760 N-INVAS EAR/PLS OXIMETRY 1: CPT

## 2020-11-03 PROCEDURE — 94640 AIRWAY INHALATION TREATMENT: CPT

## 2020-11-03 PROCEDURE — 2709999900 HC NON-CHARGEABLE SUPPLY

## 2020-11-03 PROCEDURE — 97161 PT EVAL LOW COMPLEX 20 MIN: CPT

## 2020-11-03 PROCEDURE — 65660000000 HC RM CCU STEPDOWN

## 2020-11-03 PROCEDURE — 82962 GLUCOSE BLOOD TEST: CPT

## 2020-11-03 PROCEDURE — 77010033678 HC OXYGEN DAILY

## 2020-11-03 PROCEDURE — 74011250636 HC RX REV CODE- 250/636: Performed by: INTERNAL MEDICINE

## 2020-11-03 PROCEDURE — 85025 COMPLETE CBC W/AUTO DIFF WBC: CPT

## 2020-11-03 PROCEDURE — 74011636637 HC RX REV CODE- 636/637: Performed by: FAMILY MEDICINE

## 2020-11-03 PROCEDURE — 97530 THERAPEUTIC ACTIVITIES: CPT

## 2020-11-03 PROCEDURE — 74011250636 HC RX REV CODE- 250/636: Performed by: HOSPITALIST

## 2020-11-03 PROCEDURE — 80048 BASIC METABOLIC PNL TOTAL CA: CPT

## 2020-11-03 PROCEDURE — 36415 COLL VENOUS BLD VENIPUNCTURE: CPT

## 2020-11-03 PROCEDURE — 74011000250 HC RX REV CODE- 250: Performed by: INTERNAL MEDICINE

## 2020-11-03 RX ORDER — FUROSEMIDE 20 MG/1
20 TABLET ORAL DAILY
Status: DISCONTINUED | OUTPATIENT
Start: 2020-11-04 | End: 2020-11-04

## 2020-11-03 RX ORDER — INSULIN LISPRO 100 [IU]/ML
INJECTION, SOLUTION INTRAVENOUS; SUBCUTANEOUS
Status: DISCONTINUED | OUTPATIENT
Start: 2020-11-03 | End: 2020-11-04 | Stop reason: HOSPADM

## 2020-11-03 RX ADMIN — BUDESONIDE AND FORMOTEROL FUMARATE DIHYDRATE 2 PUFF: 160; 4.5 AEROSOL RESPIRATORY (INHALATION) at 07:43

## 2020-11-03 RX ADMIN — LOSARTAN POTASSIUM 25 MG: 25 TABLET ORAL at 08:28

## 2020-11-03 RX ADMIN — GABAPENTIN 100 MG: 100 CAPSULE ORAL at 18:12

## 2020-11-03 RX ADMIN — GABAPENTIN 100 MG: 100 CAPSULE ORAL at 23:12

## 2020-11-03 RX ADMIN — BUDESONIDE AND FORMOTEROL FUMARATE DIHYDRATE 2 PUFF: 160; 4.5 AEROSOL RESPIRATORY (INHALATION) at 20:18

## 2020-11-03 RX ADMIN — ALBUTEROL SULFATE 2.5 MG: 2.5 SOLUTION RESPIRATORY (INHALATION) at 02:57

## 2020-11-03 RX ADMIN — TRAMADOL HYDROCHLORIDE 50 MG: 50 TABLET, FILM COATED ORAL at 23:17

## 2020-11-03 RX ADMIN — ALBUTEROL SULFATE 2.5 MG: 2.5 SOLUTION RESPIRATORY (INHALATION) at 07:43

## 2020-11-03 RX ADMIN — ENOXAPARIN SODIUM 40 MG: 40 INJECTION SUBCUTANEOUS at 08:28

## 2020-11-03 RX ADMIN — ALBUTEROL SULFATE 2.5 MG: 2.5 SOLUTION RESPIRATORY (INHALATION) at 20:17

## 2020-11-03 RX ADMIN — CEFTRIAXONE 1 G: 1 INJECTION, POWDER, FOR SOLUTION INTRAMUSCULAR; INTRAVENOUS at 18:13

## 2020-11-03 RX ADMIN — LATANOPROST 1 DROP: 50 SOLUTION OPHTHALMIC at 18:14

## 2020-11-03 RX ADMIN — TRAMADOL HYDROCHLORIDE 50 MG: 50 TABLET, FILM COATED ORAL at 08:28

## 2020-11-03 RX ADMIN — ATORVASTATIN CALCIUM 10 MG: 10 TABLET, FILM COATED ORAL at 23:12

## 2020-11-03 RX ADMIN — FUROSEMIDE 20 MG: 10 INJECTION, SOLUTION INTRAMUSCULAR; INTRAVENOUS at 08:29

## 2020-11-03 RX ADMIN — GABAPENTIN 100 MG: 100 CAPSULE ORAL at 08:28

## 2020-11-03 RX ADMIN — INSULIN LISPRO 2 UNITS: 100 INJECTION, SOLUTION INTRAVENOUS; SUBCUTANEOUS at 23:12

## 2020-11-03 RX ADMIN — VITAMIN D, TAB 1000IU (100/BT) 2 TABLET: 25 TAB at 08:28

## 2020-11-03 RX ADMIN — VITAMIN D, TAB 1000IU (100/BT) 2 TABLET: 25 TAB at 18:12

## 2020-11-03 RX ADMIN — ALBUTEROL SULFATE 2.5 MG: 2.5 SOLUTION RESPIRATORY (INHALATION) at 13:59

## 2020-11-03 NOTE — PROGRESS NOTES
Per PT, patient will need  PT services. Order placed with [facility] for Chuck Melendrez RN, PT services. CM will remain available to assist with any new discharge needs.

## 2020-11-03 NOTE — PROGRESS NOTES
Pt resting in the bed, no verbalized c/o's  IV capped,  Pt alert and oriented,  Asking how to increase the volume with TV remote.    Bed is in low locked position and call light within reach

## 2020-11-03 NOTE — PROGRESS NOTES
Progress Note    Patient: Trey Perez MRN: [id number]  SSN: [id number]    YOB: 1940  Age: 79 y.o. Sex: female      Admit Date: 10/31/2020    LOS: 3 days     Subjective:   80 y/o female with hx chronic respiratory failure 2/2 COPD on baseline O2 at 6L, HTN, DM2, arthritis who presents to ED with family for new onset confusion. In ED, her initial O2 sat was 83% on RA but did not arrive wearing her O2. UA was positive for UTI. Abnormal labs include BNP 2,536, troponin 83.1 and chest xray with mild CHF. She was given lasix IV in ED. She was admitted for acute metabolic encephalopathy that was most likely 2/2 UTI. She was placed on Rocephin pending UCx. Head CT was negative. This morning pt was in good mood and stated her breathing has improved. Has chronic headache 2/2 history of post-herpetic neuralgia. Denies worsening SOB on her home O2, denies chest pain, no abdominal pain, no nausea or vomiting. Objective:     Vitals:    11/02/20 2315 11/03/20 0301 11/03/20 0435 11/03/20 0731   BP: 119/65  (!) 124/59 (!) 134/58   Pulse: 76  72 73   Resp: 16  16 20   Temp: 98.5 °F (36.9 °C)  98.1 °F (36.7 °C) 97.5 °F (36.4 °C)   SpO2: 90% 92% 90% 95%   Weight:       Height:            Intake and Output:  Current Shift: No intake/output data recorded.   Last three shifts: 11/01 1901 - 11/03 0700  In: -   Out: 2910 [Urine:2910]   Last 3 Recorded Weights in this Encounter    10/31/20 1446 11/01/20 1321   Weight: 86.2 kg (190 lb) 95.3 kg (210 lb)       ROS  10 ROS negative except from stated on subjective    Physical Exam:   General: Alert, oriented, NAD  HEENT: NC/AT, EOM are intact  Neck: supple, no JVD  Cardiovascular: RRR, S1, S2, no murmurs  Respiratory: Wheezes, mild crackles on lung bases  Abdomen: Soft, NT, ND  Back: No CVA tenderness, no paraspinal tenderness  Extremities: LE without pedal edema, no erythema  Neuro: A&O, CN are intact, no focal deficits  Skin: no rash or ulcers  Psych: good mood and affect    Lab/Data Review:  I have personally reviewed patients laboratory data showing  Recent Results (from the past 24 hour(s))   CBC WITH AUTOMATED DIFF    Collection Time: 11/03/20  5:46 AM   Result Value Ref Range    WBC 6.2 4.3 - 11.1 K/uL    RBC 5.11 4.05 - 5.2 M/uL    HGB 15.9 (H) 11.7 - 15.4 g/dL    HCT 52.7 (H) 35.8 - 46.3 %    .1 (H) 79.6 - 97.8 FL    MCH 31.1 26.1 - 32.9 PG    MCHC 30.2 (L) 31.4 - 35.0 g/dL    RDW 15.5 (H) 11.9 - 14.6 %    PLATELET 795 787 - 263 K/uL    MPV 12.3 9.4 - 12.3 FL    ABSOLUTE NRBC 0.00 0.0 - 0.2 K/uL    DF AUTOMATED      NEUTROPHILS 57 43 - 78 %    LYMPHOCYTES 23 13 - 44 %    MONOCYTES 13 (H) 4.0 - 12.0 %    EOSINOPHILS 5 0.5 - 7.8 %    BASOPHILS 1 0.0 - 2.0 %    IMMATURE GRANULOCYTES 0 0.0 - 5.0 %    ABS. NEUTROPHILS 3.6 1.7 - 8.2 K/UL    ABS. LYMPHOCYTES 1.4 0.5 - 4.6 K/UL    ABS. MONOCYTES 0.8 0.1 - 1.3 K/UL    ABS. EOSINOPHILS 0.3 0.0 - 0.8 K/UL    ABS. BASOPHILS 0.0 0.0 - 0.2 K/UL    ABS. IMM. GRANS. 0.0 0.0 - 0.5 K/UL   METABOLIC PANEL, BASIC    Collection Time: 11/03/20  5:46 AM   Result Value Ref Range    Sodium 139 136 - 145 mmol/L    Potassium 4.1 3.5 - 5.1 mmol/L    Chloride 100 98 - 107 mmol/L    CO2 36 (H) 21 - 32 mmol/L    Anion gap 3 (L) 7 - 16 mmol/L    Glucose 86 65 - 100 mg/dL    BUN 21 8 - 23 MG/DL    Creatinine 0.99 0.6 - 1.0 MG/DL    GFR est AA >60 >60 ml/min/1.73m2    GFR est non-AA 57 (L) >60 ml/min/1.73m2    Calcium 8.8 8.3 - 10.4 MG/DL        Image:  I have personally reviewed patients imaging showing  CT HEAD WO CONT   Final Result   IMPRESSION:     NO ACUTE INTRACRANIAL ABNORMALITY IDENTIFIED AT NONCONTRAST CT.               XR CHEST PORT   Final Result   IMPRESSION:  MILD CONGESTIVE HEART FAILURE.            Hospital problems     Principal Problem:    Acute CHF (congestive heart failure) ([facility] (10/31/2020)    Active Problems:    Chronic obstructive lung disease ([facility] (9/18/2014)      Essential hypertension (9/18/2012)      Herpes zoster with ophthalmic complication (2/3/1272)      Obstructive sleep apnea syndrome (10/4/2013)      Post herpetic neuralgia (12/10/2015)      Overview: Last Assessment & Plan:       She is doing well. Lyrica and tegretol are well tolerated and are       affective. Continue medications. Check labs. Follow up in 1 year. Diabetes ([facility] (10/4/2013)      Acute UTI (urinary tract infection) (10/31/2020)      Acute encephalopathy ()        Assessment and Plan:   78 y/o female with hx COPD on 24/7 O2 at 6L, HTN, DM2, arthritis who presents to ED with family for new onset confusion    Metabolic encephalopathy concerning of UTI, improved  - CT head no intracranial abnormality  - Continue  Treatment for UTI per below    Acute complicated cystitis  -Abx: Ceftriaxone (11/1-. ..)  -Ucx: >100K GNR and presumptive enterococcus species.   -Cont Ceftriaxone pending     COPD on 6L (baseline) / Pulm edema concerning of HF  - TTE 11/1: EF 60-65%  - O therapy to maintain O2 Sat>92%  - Albuterol, symbicort  - Repeat CXR 11/3 shows improvement in pulmonary edema  - Changed IV Lasix to po  - I&Os, Daily weights  - Added spiriva     HTN  - Continue losartan     DMII: Stable. SSI and hypoglycemia protocol    COLLEEN: May need repeat outpatient sleep studies and CPAP. Polycythemia vera:  Most likely 2/2 chronic hypoxia    DVT ppx lovenox      Signed By: Jean Carlos Trinidad DO     November 3, 2020

## 2020-11-03 NOTE — PROGRESS NOTES
Problem: Mobility Impaired (Adult and Pediatric)  Goal: *Acute Goals and Plan of Care (Insert Text)  Description:   LTG:  (1.)Ms. Simona Hyman will move from supine to sit and sit to supine  in bed with SUPERVISION within 5 treatment day(s). (2.)Ms. Simona Hyman will transfer from bed to chair and chair to bed with SUPERVISION using the least restrictive device within 5 treatment day(s). (3.)Ms. Simona Hyman will ambulate with SUPERVISION for 50 feet with the least restrictive device within 5 treatment day(s). (4.)Ms. Simona Hyman will ambulate up and down 3 steps with R railing and CGA within 5 treatment days. ________________________________________________________________________________________________      Outcome: Progressing Towards Goal     PHYSICAL THERAPY: Initial Assessment and AM 11/3/2020  INPATIENT: PT Visit Days : 1  Payor: [address] [place] [address],9D / Plan: [employer] [address] / Product Type: Managed Care Medicare /       NAME/AGE/GENDER: Nehemias Mullen is a 79 y.o. female   PRIMARY DIAGNOSIS: Acute CHF (congestive heart failure) ([facility] [I50.9]  Acute UTI (urinary tract infection) [N39.0] Acute CHF (congestive heart failure) ([facility]) Acute CHF (congestive heart failure) ([facility]        ICD-10: Treatment Diagnosis:    Generalized Muscle Weakness (M62.81)  Difficulty in walking, Not elsewhere classified (R26.2)   Precaution/Allergies:  Chocolate flavor and Triamcinolone acetonide      ASSESSMENT:     Ms. Simona Hyman presents with above diagnoses. She demonstrates weakness and limited tolerance for activity affecting her independence with mobility. Patient uses a rolling walker at baseline and is on 6L O2 at all times. She has assist from her son for household ADLs. Patient would benefit from therapy to address above deficits. Expect HHPT would be beneficial as she did have difficulty with standing/walking during assessment.   She was able to stand with SBA-CGA at the walker but had to prop her forearms on the walker to sustain standing for brief change and linen change. She was also able to take side steps along the bed with SBA-CGA but would not transfer to chair for therapist.  Patient pleasant and oriented and should progress with therapy intervention. This section established at most recent assessment   PROBLEM LIST (Impairments causing functional limitations):  Decreased Strength  Decreased ADL/Functional Activities  Decreased Transfer Abilities  Decreased Ambulation Ability/Technique  Decreased Balance  Increased Pain  Decreased Activity Tolerance  Increased Shortness of Breath   INTERVENTIONS PLANNED: (Benefits and precautions of physical therapy have been discussed with the patient.)  Balance Exercise  Bed Mobility  Family Education  Gait Training  Home Exercise Program (HEP)  Therapeutic Activites  Therapeutic Exercise/Strengthening     TREATMENT PLAN: Frequency/Duration: daily for duration of hospital stay  Rehabilitation Potential For Stated Goals: Good     REHAB RECOMMENDATIONS (at time of discharge pending progress):    Placement: It is my opinion, based on this patient's performance to date, that Ms. George Thomas may benefit from [address] after discharge due to the functional deficits listed above that are likely to improve with skilled rehabilitation because he/she has multiple medical issues that affect his/her functional mobility in the community. Equipment:   None at this time              HISTORY:   History of Present Injury/Illness (Reason for Referral):  Patient is an 78 y/o female with hx chronic respiratory failure, on 24/7 O2 at 6L, HTN, DM2, arthritis who presents to ED with family for new onset confusion since Wednesday, incontinence, slurred speech, myalgias. Her initial O2 sat was 83% on RA in ED but did not arrive wearing her O2. She denies chest pain or abdominal pain. ED workup positive for UTI with urine small leuk esterase, 10-20 WBC and 4+ bacteria.  WBC and lactic acid are normal. BNP 2,536, troponin 83.1 and chest xray with mild CHF. Cannot find a recent echocariogram. She was given IV lasix in ED as well as rocephin for UTI. Non-contrast head CT is negative. Hospitalist service consulted for admission. Past Medical History/Comorbidities:   Ms. Samia Schultz  has a past medical history of Arthritis, Diabetes ([address] Hypertension, and Shingles. Ms. Samia Schultz  has no past surgical history on file. Social History/Living Environment:   Home Environment: Private residence  # Steps to Enter: 3  Rails to Enter: Yes  Hand Rails : Right  One/Two Story Residence: One story  Living Alone: No  Support Systems: Child(christian)  Patient Expects to be Discharged to[de-identified] Private residence  Current DME Used/Available at Home: Walker, rollator, Walker, rolling, Wheelchair, Commode, bedside, Oxygen, portable  Prior Level of Function/Work/Activity:  Ambulatory with RW. Number of Personal Factors/Comorbidities that affect the Plan of Care: 0: LOW COMPLEXITY   EXAMINATION:   Most Recent Physical Functioning:   Gross Assessment:  AROM: Generally decreased, functional  Strength: Generally decreased, functional  Coordination: Generally decreased, functional               Posture:     Balance:  Sitting: Intact  Standing: Pull to stand; With support Bed Mobility:  Supine to Sit: Stand-by assistance  Sit to Supine: Stand-by assistance  Scooting: Stand-by assistance  Wheelchair Mobility:     Transfers:  Sit to Stand: Stand-by assistance;Contact guard assistance  Stand to Sit: Stand-by assistance  Gait:     Base of Support: Center of gravity altered  Speed/Ligia: Slow  Step Length: Left shortened;Right shortened  Gait Abnormalities: Decreased step clearance  Distance (ft): (4-5 side steps along side of bed)  Assistive Device: Walker, rolling  Ambulation - Level of Assistance: Stand-by assistance;Contact guard assistance  Interventions: Safety awareness training;Verbal cues      Body Structures Involved:  Muscles Body Functions Affected: Movement Related Activities and Participation Affected: Mobility   Number of elements that affect the Plan of Care: 3: MODERATE COMPLEXITY   CLINICAL PRESENTATION:   Presentation: Stable and uncomplicated: LOW COMPLEXITY   CLINICAL DECISION MAKIN [facility] [address] [id number] AM-PAC 6 Clicks   Basic Mobility Inpatient Short Form  How much difficulty does the patient currently have. .. Unable A Lot A Little None   1. Turning over in bed (including adjusting bedclothes, sheets and blankets)? [] 1   [] 2   [x] 3   [] 4   2. Sitting down on and standing up from a chair with arms ( e.g., wheelchair, bedside commode, etc.)   [] 1   [] 2   [x] 3   [] 4   3. Moving from lying on back to sitting on the side of the bed? [] 1   [] 2   [x] 3   [] 4   How much help from another person does the patient currently need. .. Total A Lot A Little None   4. Moving to and from a bed to a chair (including a wheelchair)? [] 1   [] 2   [x] 3   [] 4   5. Need to walk in hospital room? [] 1   [] 2   [x] 3   [] 4   6. Climbing 3-5 steps with a railing? [] 1   [x] 2   [] 3   [] 4   © , Trustees of [address] [place] [address] [address] [address], under license to [employer]. All rights reserved      Score:  Initial: 17 Most Recent: X (Date: -- )    Interpretation of Tool:  Represents activities that are increasingly more difficult (i.e. Bed mobility, Transfers, Gait). Medical Necessity:     Patient is expected to demonstrate progress in   strength, balance, and coordination   to   increase independence with mobility. .  Reason for Services/Other Comments:  Patient continues to require skilled intervention due to   Decreased strength, balance, and tolerance for activity.   .   Use of outcome tool(s) and clinical judgement create a POC that gives a: Clear prediction of patient's progress: LOW COMPLEXITY            TREATMENT:   (In addition to Assessment/Re-Assessment sessions the following treatments were rendered)   Pre-treatment Symptoms/Complaints:  Patient agreeable. Pain: Initial:   Pain Intensity 1: 5  Pain Location 1: Buttocks  Post Session:  0/10-back in bed     Therapeutic Activity: (    10 minutes): Therapeutic activities including multiple sit <> stands from bed, sustained standing balance at walker for brief change and linen change and side stepping along bed to improve mobility, strength, balance, and coordination. Required minimal Safety awareness training;Verbal cues to promote static and dynamic balance in standing. Braces/Orthotics/Lines/Etc:   O2 Device: Nasal cannula  Treatment/Session Assessment:    Response to Treatment:  Participated well and moved better than anticipated. Interdisciplinary Collaboration:   Physical Therapist  Registered Nurse  Student RN  After treatment position/precautions:   Supine in bed  Bed/Chair-wheels locked  Call light within reach   Compliance with Program/Exercises: Will assess as treatment progresses  Recommendations/Intent for next treatment session: \"Next visit will focus on advancements to more challenging activities and reduction in assistance provided\".   Total Treatment Duration:  PT Patient Time In/Time Out  Time In: 0940  Time Out: [facility], PT

## 2020-11-03 NOTE — PROGRESS NOTES
Shift assessment completed. Alert and oriented x4. Pt sitting up in bed. Respirations even and unlabored. Lung sounds wet, upper and lower, bilaterally. Pt on 6L nasal cannula. Pt on telemetry, box [id number]. Bowel sounds active in all quadrants. Purewick in place. Attached to suction and patent. Urine recorded at 175. Urine palak, clear, no sediment or odor. Musculoskeltal weakness in upper and lower, more so lower extremities. Physical therapy consulted to see pt today. IV in the left AC patent and flushing. Complains of headache and pain around the left eye. Bed locked, in lowest position, call light within reach.

## 2020-11-04 VITALS
BODY MASS INDEX: 37.21 KG/M2 | TEMPERATURE: 98.9 F | OXYGEN SATURATION: 91 % | DIASTOLIC BLOOD PRESSURE: 59 MMHG | SYSTOLIC BLOOD PRESSURE: 97 MMHG | WEIGHT: 210 LBS | HEIGHT: 63 IN | RESPIRATION RATE: 18 BRPM | HEART RATE: 71 BPM

## 2020-11-04 LAB
ANION GAP SERPL CALC-SCNC: 3 MMOL/L (ref 7–16)
BACTERIA SPEC CULT: ABNORMAL
BASOPHILS # BLD: 0 K/UL (ref 0–0.2)
BASOPHILS NFR BLD: 0 % (ref 0–2)
BUN SERPL-MCNC: 22 MG/DL (ref 8–23)
CALCIUM SERPL-MCNC: 9.1 MG/DL (ref 8.3–10.4)
CHLORIDE SERPL-SCNC: 102 MMOL/L (ref 98–107)
CO2 SERPL-SCNC: 33 MMOL/L (ref 21–32)
CREAT SERPL-MCNC: 0.84 MG/DL (ref 0.6–1)
DIFFERENTIAL METHOD BLD: ABNORMAL
EOSINOPHIL # BLD: 0.5 K/UL (ref 0–0.8)
EOSINOPHIL NFR BLD: 8 % (ref 0.5–7.8)
ERYTHROCYTE [DISTWIDTH] IN BLOOD BY AUTOMATED COUNT: 15.7 % (ref 11.9–14.6)
EST. AVERAGE GLUCOSE BLD GHB EST-MCNC: 111 MG/DL
GLUCOSE BLD STRIP.AUTO-MCNC: 119 MG/DL (ref 65–100)
GLUCOSE BLD STRIP.AUTO-MCNC: 123 MG/DL (ref 65–100)
GLUCOSE BLD STRIP.AUTO-MCNC: 83 MG/DL (ref 65–100)
GLUCOSE SERPL-MCNC: 86 MG/DL (ref 65–100)
HBA1C MFR BLD: 5.5 %
HCT VFR BLD AUTO: 50 % (ref 35.8–46.3)
HGB BLD-MCNC: 15.1 G/DL (ref 11.7–15.4)
IMM GRANULOCYTES # BLD AUTO: 0 K/UL (ref 0–0.5)
IMM GRANULOCYTES NFR BLD AUTO: 0 % (ref 0–5)
LYMPHOCYTES # BLD: 1.6 K/UL (ref 0.5–4.6)
LYMPHOCYTES NFR BLD: 26 % (ref 13–44)
MCH RBC QN AUTO: 30.7 PG (ref 26.1–32.9)
MCHC RBC AUTO-ENTMCNC: 30.2 G/DL (ref 31.4–35)
MCV RBC AUTO: 101.6 FL (ref 79.6–97.8)
MONOCYTES # BLD: 0.9 K/UL (ref 0.1–1.3)
MONOCYTES NFR BLD: 14 % (ref 4–12)
NEUTS SEG # BLD: 3.2 K/UL (ref 1.7–8.2)
NEUTS SEG NFR BLD: 51 % (ref 43–78)
NRBC # BLD: 0 K/UL (ref 0–0.2)
PLATELET # BLD AUTO: 140 K/UL (ref 150–450)
PMV BLD AUTO: 11.9 FL (ref 9.4–12.3)
POTASSIUM SERPL-SCNC: 4.2 MMOL/L (ref 3.5–5.1)
RBC # BLD AUTO: 4.92 M/UL (ref 4.05–5.2)
SERVICE CMNT-IMP: ABNORMAL
SODIUM SERPL-SCNC: 138 MMOL/L (ref 136–145)
WBC # BLD AUTO: 6.2 K/UL (ref 4.3–11.1)

## 2020-11-04 PROCEDURE — 77010033678 HC OXYGEN DAILY

## 2020-11-04 PROCEDURE — 82962 GLUCOSE BLOOD TEST: CPT

## 2020-11-04 PROCEDURE — 97530 THERAPEUTIC ACTIVITIES: CPT

## 2020-11-04 PROCEDURE — 83036 HEMOGLOBIN GLYCOSYLATED A1C: CPT

## 2020-11-04 PROCEDURE — 94760 N-INVAS EAR/PLS OXIMETRY 1: CPT

## 2020-11-04 PROCEDURE — 94640 AIRWAY INHALATION TREATMENT: CPT

## 2020-11-04 PROCEDURE — 74011250637 HC RX REV CODE- 250/637: Performed by: FAMILY MEDICINE

## 2020-11-04 PROCEDURE — 80048 BASIC METABOLIC PNL TOTAL CA: CPT

## 2020-11-04 PROCEDURE — 74011000250 HC RX REV CODE- 250: Performed by: INTERNAL MEDICINE

## 2020-11-04 PROCEDURE — 74011250637 HC RX REV CODE- 250/637: Performed by: HOSPITALIST

## 2020-11-04 PROCEDURE — 74011250636 HC RX REV CODE- 250/636: Performed by: HOSPITALIST

## 2020-11-04 PROCEDURE — 36415 COLL VENOUS BLD VENIPUNCTURE: CPT

## 2020-11-04 PROCEDURE — 85025 COMPLETE CBC W/AUTO DIFF WBC: CPT

## 2020-11-04 PROCEDURE — 97116 GAIT TRAINING THERAPY: CPT

## 2020-11-04 RX ORDER — DOXYCYCLINE 100 MG/1
100 CAPSULE ORAL EVERY 12 HOURS
Status: DISCONTINUED | OUTPATIENT
Start: 2020-11-04 | End: 2020-11-04 | Stop reason: HOSPADM

## 2020-11-04 RX ORDER — DOXYCYCLINE 100 MG/1
100 CAPSULE ORAL EVERY 12 HOURS
Qty: 14 CAP | Refills: 0 | Status: SHIPPED | OUTPATIENT
Start: 2020-11-04 | End: 2020-11-11

## 2020-11-04 RX ORDER — CEPHALEXIN 500 MG/1
500 CAPSULE ORAL EVERY 8 HOURS
Qty: 12 CAP | Refills: 0 | Status: SHIPPED | OUTPATIENT
Start: 2020-11-04 | End: 2020-11-08

## 2020-11-04 RX ORDER — CEPHALEXIN 500 MG/1
500 CAPSULE ORAL EVERY 8 HOURS
Status: DISCONTINUED | OUTPATIENT
Start: 2020-11-04 | End: 2020-11-04 | Stop reason: HOSPADM

## 2020-11-04 RX ORDER — GABAPENTIN 100 MG/1
100 CAPSULE ORAL 3 TIMES DAILY
Qty: 30 CAP | Refills: 0 | Status: SHIPPED
Start: 2020-11-04

## 2020-11-04 RX ADMIN — FUROSEMIDE 20 MG: 20 TABLET ORAL at 09:01

## 2020-11-04 RX ADMIN — GABAPENTIN 100 MG: 100 CAPSULE ORAL at 16:34

## 2020-11-04 RX ADMIN — ALBUTEROL SULFATE 2.5 MG: 2.5 SOLUTION RESPIRATORY (INHALATION) at 08:04

## 2020-11-04 RX ADMIN — BUDESONIDE AND FORMOTEROL FUMARATE DIHYDRATE 2 PUFF: 160; 4.5 AEROSOL RESPIRATORY (INHALATION) at 08:06

## 2020-11-04 RX ADMIN — ENOXAPARIN SODIUM 40 MG: 40 INJECTION SUBCUTANEOUS at 09:02

## 2020-11-04 RX ADMIN — TIOTROPIUM BROMIDE INHALATION SPRAY 2 PUFF: 3.12 SPRAY, METERED RESPIRATORY (INHALATION) at 08:06

## 2020-11-04 RX ADMIN — DOXYCYCLINE HYCLATE 100 MG: 100 CAPSULE ORAL at 16:34

## 2020-11-04 RX ADMIN — CEPHALEXIN 500 MG: 500 CAPSULE ORAL at 16:34

## 2020-11-04 RX ADMIN — GABAPENTIN 100 MG: 100 CAPSULE ORAL at 09:01

## 2020-11-04 RX ADMIN — ALBUTEROL SULFATE 2.5 MG: 2.5 SOLUTION RESPIRATORY (INHALATION) at 02:18

## 2020-11-04 RX ADMIN — LOSARTAN POTASSIUM 25 MG: 25 TABLET ORAL at 09:01

## 2020-11-04 RX ADMIN — VITAMIN D, TAB 1000IU (100/BT) 2 TABLET: 25 TAB at 09:01

## 2020-11-04 NOTE — DISCHARGE INSTRUCTIONS
Patient Education        Heart Failure: Care Instructions  Your Care Instructions     Heart failure occurs when your heart does not pump as much blood as the body needs. Failure does not mean that the heart has stopped pumping but rather that it is not pumping as well as it should. Over time, this causes fluid buildup in your lungs and other parts of your body. Fluid buildup can cause shortness of breath, fatigue, swollen ankles, and other problems. By taking medicines regularly, reducing sodium (salt) in your diet, checking your weight every day, and making lifestyle changes, you can feel better and live longer. Follow-up care is a key part of your treatment and safety. Be sure to make and go to all appointments, and call your doctor if you are having problems. It's also a good idea to know your test results and keep a list of the medicines you take. How can you care for yourself at home? Medicines    · Be safe with medicines. Take your medicines exactly as prescribed. Call your doctor if you think you are having a problem with your medicine.     · Do not take any vitamins, over-the-counter medicine, or herbal products without talking to your doctor first. Earnest Rodriguezey not take ibuprofen (Advil or Motrin) and naproxen (Aleve) without talking to your doctor first. They could make your heart failure worse.     · You may take some of the following medicine. ? Angiotensin-converting enzyme inhibitors (ACEIs) or angiotensin II receptor blockers (ARBs) reduce the heart's workload, lower blood pressure, and reduce swelling. Taking an ACEI or ARB may lower your chance of needing to be hospitalized. ? Beta-blockers can slow heart rate, decrease blood pressure, and improve your condition. Taking a beta-blocker may lower your chance of needing to be hospitalized. ? Diuretics, also called water pills, reduce swelling. You will get more details on the specific medicines your doctor prescribes.   Diet    · Your doctor may suggest that you limit sodium. Your doctor can tell you how much sodium is right for you. An example is less than 3,000 mg a day. This includes all the salt you eat in cooking or in packaged foods. People get most of their sodium from processed foods. Fast food and restaurant meals also tend to be very high in sodium.     · Ask your doctor how much liquid you can drink each day. You may have to limit liquids. Weight    · Weigh yourself without clothing at the same time each day. Record your weight. Call your doctor if you have a sudden weight gain, such as more than 2 to 3 pounds in a day or 5 pounds in a week. (Your doctor may suggest a different range of weight gain.) A sudden weight gain may mean that your heart failure is getting worse. Activity level    · Start light exercise (if your doctor says it is okay). Even if you can only do a small amount, exercise will help you get stronger, have more energy, and manage your weight and your stress. Walking is an easy way to get exercise. Start out by walking a little more than you did before. Bit by bit, increase the amount you walk.     · When you exercise, watch for signs that your heart is working too hard. You are pushing yourself too hard if you cannot talk while you are exercising. If you become short of breath or dizzy or have chest pain, stop, sit down, and rest.     · If you feel \"wiped out\" the day after you exercise, walk slower or for a shorter distance until you can work up to a better pace.     · Get enough rest at night. Sleeping with 1 or 2 pillows under your upper body and head may help you breathe easier. Lifestyle changes    · Do not smoke. Smoking can make a heart condition worse. If you need help quitting, talk to your doctor about stop-smoking programs and medicines. These can increase your chances of quitting for good.  Quitting smoking may be the most important step you can take to protect your heart.     · Limit alcohol to 2 drinks a day for men and 1 drink a day for women. Too much alcohol can cause health problems.     · Avoid getting sick from colds and the flu. Get a pneumococcal vaccine shot. If you have had one before, ask your doctor whether you need another dose. Get a flu shot each year. If you must be around people with colds or the flu, wash your hands often. When should you call for help? Call 911 if you have symptoms of sudden heart failure such as:    · You have severe trouble breathing.     · You cough up pink, foamy mucus.     · You have a new irregular or rapid heartbeat. Call your doctor now or seek immediate medical care if:    · You have new or increased shortness of breath.     · You are dizzy or lightheaded, or you feel like you may faint.     · You have sudden weight gain, such as more than 2 to 3 pounds in a day or 5 pounds in a week. (Your doctor may suggest a different range of weight gain.)     · You have increased swelling in your legs, ankles, or feet.     · You are suddenly so tired or weak that you cannot do your usual activities. Watch closely for changes in your health, and be sure to contact your doctor if you develop new symptoms. Where can you learn more? Go to http://www.gray.com/  Enter T602 in the search box to learn more about \"Heart Failure: Care Instructions. \"  Current as of: December 16, 2019               Content Version: 12.6  © 7922-7402 [employer]. Care instructions adapted under license by [employer] (which disclaims liability or warranty for this information). If you have questions about a medical condition or this instruction, always ask your healthcare professional. [place] [address] any warranty or liability for your use of this information.

## 2020-11-04 NOTE — PROGRESS NOTES
Resting in bed with O2 via NC. Waiting for breakfast. Lungs coarse with wheezes. Nonproductive cough.

## 2020-11-04 NOTE — PROGRESS NOTES
Problem: Mobility Impaired (Adult and Pediatric)  Goal: *Acute Goals and Plan of Care (Insert Text)  Description:   LTG:  (1.)Ms. Almas Carlson will move from supine to sit and sit to supine  in bed with SUPERVISION within 5 treatment day(s). (2.)Ms. Almas Carlson will transfer from bed to chair and chair to bed with SUPERVISION using the least restrictive device within 5 treatment day(s). (3.)Ms. Almas Carlson will ambulate with SUPERVISION for 50 feet with the least restrictive device within 5 treatment day(s). (4.)Ms. Almas Carlson will ambulate up and down 3 steps with R railing and CGA within 5 treatment days. ________________________________________________________________________________________________      Outcome: Progressing Towards Goal     PHYSICAL THERAPY: Daily Note and AM 11/4/2020  INPATIENT: PT Visit Days : 1  Payor: [address] [place],9D / Plan: [address] / Product Type: Managed Care Medicare /       NAME/AGE/GENDER: Dru Joseph is a 79 y.o. female   PRIMARY DIAGNOSIS: Acute CHF (congestive heart failure) ([facility] [I50.9]  Acute UTI (urinary tract infection) [N39.0] Acute CHF (congestive heart failure) ([facility] [address] Acute CHF (congestive heart failure) ([facility] [address]       ICD-10: Treatment Diagnosis:    · Generalized Muscle Weakness (M62.81)  · Difficulty in walking, Not elsewhere classified (R26.2)   Precaution/Allergies:  Chocolate flavor and Triamcinolone acetonide      ASSESSMENT:     Ms. Almas Carlson presents with above diagnoses. She demonstrates weakness and limited tolerance for activity affecting her independence with mobility. Patient uses a rolling walker at baseline and is on 6L O2 at all times. She has assist from her son for household ADLs. Patient would benefit from therapy to address above deficits. Expect HHPT would be beneficial as she did have difficulty with standing/walking during assessment.   She was able to stand with SBA-CGA at the walker but had to prop her forearms on the walker to sustain standing for brief change and linen change. She was also able to take side steps along the bed with SBA-CGA but would not transfer to chair for therapist.  Patient pleasant and oriented and should progress with therapy intervention. 11/4--Pt performed supine to sit to stand. Pt ambulated into bathroom. Pt on O2, so long O2 tube used to allow ambulation into bathroom. Pt performed sit to supine. Pt assisted supine with needs in reach. This section established at most recent assessment   PROBLEM LIST (Impairments causing functional limitations):  1. Decreased Strength  2. Decreased ADL/Functional Activities  3. Decreased Transfer Abilities  4. Decreased Ambulation Ability/Technique  5. Decreased Balance  6. Increased Pain  7. Decreased Activity Tolerance  8. Increased Shortness of Breath   INTERVENTIONS PLANNED: (Benefits and precautions of physical therapy have been discussed with the patient.)  1. Balance Exercise  2. Bed Mobility  3. Family Education  4. Gait Training  5. Home Exercise Program (HEP)  6. Therapeutic Activites  7. Therapeutic Exercise/Strengthening     TREATMENT PLAN: Frequency/Duration: daily for duration of hospital stay  Rehabilitation Potential For Stated Goals: Good     REHAB RECOMMENDATIONS (at time of discharge pending progress):    Placement: It is my opinion, based on this patient's performance to date, that Ms. Lilia Blanca may benefit from [address] after discharge due to the functional deficits listed above that are likely to improve with skilled rehabilitation because he/she has multiple medical issues that affect his/her functional mobility in the community.   Equipment:    None at this time              HISTORY:   History of Present Injury/Illness (Reason for Referral):  Patient is an 78 y/o female with hx chronic respiratory failure, on 24/7 O2 at 6L, HTN, DM2, arthritis who presents to ED with family for new onset confusion since Wednesday, incontinence, slurred speech, myalgias. Her initial O2 sat was 83% on RA in ED but did not arrive wearing her O2. She denies chest pain or abdominal pain. ED workup positive for UTI with urine small leuk esterase, 10-20 WBC and 4+ bacteria. WBC and lactic acid are normal. BNP 2,536, troponin 83.1 and chest xray with mild CHF. Cannot find a recent echocariogram. She was given IV lasix in ED as well as rocephin for UTI. Non-contrast head CT is negative. Hospitalist service consulted for admission. Past Medical History/Comorbidities:   Ms. Alannah Nguyen  has a past medical history of Arthritis, Diabetes ([address] Hypertension, and Shingles. Ms. Alannah Nguyen  has no past surgical history on file. Social History/Living Environment:   Home Environment: Private residence  # Steps to Enter: 3  Rails to Enter: Yes  Hand Rails : Right  One/Two Story Residence: One story  Living Alone: No  Support Systems: Child(christian)  Patient Expects to be Discharged to[de-identified] Private residence  Current DME Used/Available at Home: Walker, rollator, Walker, rolling, Wheelchair, Commode, bedside, Oxygen, portable  Prior Level of Function/Work/Activity:  Ambulatory with RW. Number of Personal Factors/Comorbidities that affect the Plan of Care: 0: LOW COMPLEXITY   EXAMINATION:   Most Recent Physical Functioning:   Gross Assessment:                  Posture:     Balance:  Sitting: Intact  Standing: Pull to stand; With support Bed Mobility:  Supine to Sit: Moderate assistance  Sit to Supine: Moderate assistance  Wheelchair Mobility:     Transfers:  Sit to Stand: Minimum assistance;Assist x2  Stand to Sit: Minimum assistance;Assist x2  Gait:     Gait Abnormalities: Other(very stooped posture)  Distance (ft): 45 Feet (ft)  Assistive Device: Walker, rolling  Ambulation - Level of Assistance: Minimal assistance;Assist x2      Body Structures Involved:  1. Muscles Body Functions Affected:  1.  Movement Related Activities and Participation Affected:  1. Mobility   Number of elements that affect the Plan of Care: 3: MODERATE COMPLEXITY   CLINICAL PRESENTATION:   Presentation: Stable and uncomplicated: LOW COMPLEXITY   CLINICAL DECISION MAKIN [facility] [address] [id number] AM-PAC 6 Clicks   Basic Mobility Inpatient Short Form  How much difficulty does the patient currently have. .. Unable A Lot A Little None   1. Turning over in bed (including adjusting bedclothes, sheets and blankets)? [] 1   [] 2   [x] 3   [] 4   2. Sitting down on and standing up from a chair with arms ( e.g., wheelchair, bedside commode, etc.)   [] 1   [] 2   [x] 3   [] 4   3. Moving from lying on back to sitting on the side of the bed? [] 1   [] 2   [x] 3   [] 4   How much help from another person does the patient currently need. .. Total A Lot A Little None   4. Moving to and from a bed to a chair (including a wheelchair)? [] 1   [] 2   [x] 3   [] 4   5. Need to walk in hospital room? [] 1   [] 2   [x] 3   [] 4   6. Climbing 3-5 steps with a railing? [] 1   [x] 2   [] 3   [] 4   © , Trustees of [facility] [address] [address], under license to [employer]. All rights reserved      Score:  Initial: 17 Most Recent: X (Date: -- )    Interpretation of Tool:  Represents activities that are increasingly more difficult (i.e. Bed mobility, Transfers, Gait). Medical Necessity:     · Patient is expected to demonstrate progress in   · strength, balance, and coordination  ·  to   · increase independence with mobility. · .  Reason for Services/Other Comments:  · Patient continues to require skilled intervention due to   · Decreased strength, balance, and tolerance for activity. · .   Use of outcome tool(s) and clinical judgement create a POC that gives a: Clear prediction of patient's progress: LOW COMPLEXITY            TREATMENT:   (In addition to Assessment/Re-Assessment sessions the following treatments were rendered)   Pre-treatment Symptoms/Complaints:  Patient agreeable.   Pain: Initial:      Post Session:  0/10-back in bed     Therapeutic Activity: (    15 minutes): Therapeutic activities including multiple sit <> stands from bed, sustained standing balance at walker for brief change and linen change and side stepping along bed to improve mobility, strength, balance, and coordination. Required minimal   to promote static and dynamic balance in standing. Gait Training (  15 minutes):  Gait training to improve and/or restore physical functioning as related to mobility. Assistive Device: Walker, rolling  Ambulation - Level of Assistance: Minimal assistance, Assist x2  Distance (ft): 45 Feet (ft)  Interventions: Safety awareness training, Verbal cues      Braces/Orthotics/Lines/Etc:   · O2 Device: Nasal cannula  Treatment/Session Assessment:    · Response to Treatment:  Pt agreeable to exercise and ambulate with therapy. · Interdisciplinary Collaboration:   o Physical Therapist  o Registered Nurse  o Certified Nursing Assistant/Patient Care Technician  · After treatment position/precautions:   o Supine in bed  o Bed/Chair-wheels locked  o Call light within reach   · Compliance with Program/Exercises: Will assess as treatment progresses  · Recommendations/Intent for next treatment session: \"Next visit will focus on advancements to more challenging activities and reduction in assistance provided\".   Total Treatment Duration:  PT Patient Time In/Time Out  Time In: 1100  Time Out:  [address], PT

## 2020-11-04 NOTE — PROGRESS NOTES
Prescriptions given to Pt and son. Pt to call for follow up with primary care doctor. Has home O2. Home health to see pt. I have reviewed discharge instructions with the patient and son. The patient and son verbalized understanding. Taken to car via wheelchair.

## 2020-11-04 NOTE — DISCHARGE SUMMARY
Hospitalist Discharge Summary     Patient ID:  Martha Salas  [id number]  29 y.o.  1940  Admit date: 10/31/2020  2:37 PM  Discharge date and time: 11/4/2020  Attending: Alejandra Read DO  PCP:  Aftab Murphy MD  Treatment Team: Attending Provider: Alejandra Read DO; Care Manager: Sanjuanita Heaton LMSW; Physical Therapist: Celestina Don PT; Utilization Review: Bianca Horn RN    Principal Diagnosis Acute encephalopathy   Principal Problem:    Acute encephalopathy ()    Active Problems:    Chronic obstructive lung disease ([facility] [address] (9/18/2014)      Essential hypertension (9/18/2012)      Herpes zoster with ophthalmic complication (8/4/0707)      Obstructive sleep apnea syndrome (10/4/2013)      Post herpetic neuralgia (12/10/2015)      Overview: Last Assessment & Plan:       She is doing well. Lyrica and tegretol are well tolerated and are       affective. Continue medications. Check labs. Follow up in 1 year. 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 In summary, the patient is an 79year old AAF with PMHx of chronic respiratory failure 2/2 COPD on baseline O2 at 6L, HTN, DM2, arthritis, chronic post-herpetic neuralgia who presents to ED with family for new onset confusion. In ED, her initial O2 sat was 83% on RA but did not arrive wearing her O2. UA was positive for UTI. Abnormal labs include BNP 2,536, troponin 83.1 and chest xray with mild CHF. She was given lasix IV in ED. She was admitted for acute metabolic encephalopathy that was most likely 2/2 UTI. She was placed on Rocephin pending UCx. Head CT was negative. Her mentation improves to baseline after she was started on antibiotic.  Her UCx grew >100K EColi and >100K Aerococcus Viridans suggested by lab to be sensitive to PCN, macrolides and tetracyclines. She was switched from Ceftriaxone to Keflex and Doxycycline to finish a total of 7 days course of antibiotics. She remained on her home O2 at 6L baseline while hospitalization. TTE was performed with EF 60-65%. She denies any chest pain and her elevated troponin, which was most likely d/t type 2 demand ischemia, was trending down. Telemetry was unremarkable. She was resumed on her home breathing treatments and Spiriva was added. She was also transitioned off of Lasix. Repeat CXR 11/3 shows improving pulmonary edema however with persistent cardiomegaly. This was consistent with CXR findings in 2018. In regards to her chronic medical condition, she has a history of difficult to control post-herpetic neuralgia. Her pain was controlled with Gabapentin here but upon chart review, pt was also on Carbamazepine as outpatient. She stated she has been out of this for 2 weeks. D/w to bring this up at PCP visit in 3-5 days for refills. It appears that she also takes Losartan-Hctz 50-12.5mg as outpatient. Her BP here was adequate with Losartan 25mg. She will also need to follow up with her PCP in regards to this. She has a history of COLLEEN but does not wear CPAP at night night and no sleep studies in system. Discussed that this will be outpatient workup for her. On day of discharge pt was hemodynamically stable, alert and oriented x3, and she was on 6L O2NC at baseline with adequate O2 sat. PT recommended for patient to have home PT. She was recommended to follow up with her PCP in 3-5 days. Significant Diagnostic Studies:   XR CHEST SNGL V   Final Result   Impression:        1. Improving pulmonary edema however with persistent cardiomegaly.       CPT code(s) 18744                  CT HEAD WO CONT   Final Result   IMPRESSION:     NO ACUTE INTRACRANIAL ABNORMALITY IDENTIFIED AT NONCONTRAST CT.               XR CHEST PORT   Final Result   IMPRESSION:  MILD CONGESTIVE HEART FAILURE. Labs: Results:       Chemistry Recent Labs     11/04/20  0505 11/03/20  0546 11/02/20  0642   GLU 86 86 93    139 139   K 4.2 4.1 4.1    100 97*   CO2 33* 36* 41*   BUN 22 21 15   CREA 0.84 0.99 0.91   CA 9.1 8.8 8.7   AGAP 3* 3* 1*      CBC w/Diff Recent Labs     11/04/20  0505 11/03/20  0546 11/02/20  0503   WBC 6.2 6.2 6.2   RBC 4.92 5.11 5.15   HGB 15.1 15.9* 16.0*   HCT 50.0* 52.7* 54.7*   * 153 125*   GRANS 51 57 50   LYMPH 26 23 27   EOS 8* 5 4      Cardiac Enzymes No results for input(s): CPK, CKND1, CLEM in the last 72 hours. No lab exists for component: CKRMB, TROIP   Coagulation No results for input(s): PTP, INR, APTT, INREXT, INREXT in the last 72 hours. Lipid Panel Lab Results   Component Value Date/Time    Cholesterol, total 185 06/15/2018 11:28 AM    HDL Cholesterol 54 06/15/2018 11:28 AM    LDL, calculated 109 (H) 06/15/2018 11:28 AM    VLDL, calculated 22 06/15/2018 11:28 AM    Triglyceride 109 06/15/2018 11:28 AM      BNP No results for input(s): BNPP in the last 72 hours. Liver Enzymes No results for input(s): TP, ALB, TBIL, AP in the last 72 hours. No lab exists for component: SGOT, GPT, DBIL   Thyroid Studies Lab Results   Component Value Date/Time    T4, Total 9.6 06/15/2018 11:28 AM    TSH 1.030 10/31/2020 03:23 PM            Discharge Exam:  Visit Vitals  /67 (BP 1 Location: Left arm, BP Patient Position: At rest)   Pulse 76   Temp 99.1 °F (37.3 °C)   Resp 18   Ht 5' 3\" (1.6 m)   Wt 95.3 kg (210 lb)   SpO2 90%   BMI 37.20 kg/m²     General appearance: alert &oriented x3, cooperative, no distress, appears stated age. Follows commands and answers questions appropriately. Lungs: Mild wheezing at lung bases  Heart: regular rate and rhythm, S1, S2 normal, no murmur, click, rub or gallop  Abdomen: soft, non-tender.  Bowel sounds normal. No masses,  no organomegaly  Extremities: no cyanosis or edema  Neurologic: Grossly normal    Disposition: home  Discharge Condition: stable  Patient Instructions:   Current Discharge Medication List      START taking these medications    Details   cephALEXin (KEFLEX) 500 mg capsule Take 1 Cap by mouth every eight (8) hours for 4 days. Indications: bacterial urinary tract infection  Qty: 12 Cap, Refills: 0      doxycycline (VIBRAMYCIN) 100 mg capsule Take 1 Cap by mouth every twelve (12) hours for 7 days. Indications: bacterial urinary tract infection  Qty: 14 Cap, Refills: 0      tiotropium bromide (SPIRIVA RESPIMAT) 2.5 mcg/actuation inhaler Take 2 Puffs by inhalation daily. Indications: bronchospasm prevention with COPD  Qty: 1 Inhaler, Refills: 1         CONTINUE these medications which have CHANGED    Details   gabapentin (NEURONTIN) 100 mg capsule Take 1 Cap by mouth three (3) times daily. Qty: 30 Cap, Refills: 0         CONTINUE these medications which have NOT CHANGED    Details   traMADol (ULTRAM) 50 mg tablet Take 1 Tab by mouth every eight (8) hours as needed for Pain. Max Daily Amount: 150 mg.  Qty: 14 Tab, Refills: 0    Associated Diagnoses: Right calf pain      BREO ELLIPTA 100-25 mcg/dose inhaler INHALE 1 PUFF BY MOUTH EVERY DAY  Refills: 11      lovastatin (MEVACOR) 40 mg tablet Take 40 mg by mouth nightly. losartan (COZAAR) 25 mg tablet Take  by mouth daily. travoprost (TRAVATAN Z) 0.004 % ophthalmic solution Administer 1 Drop to both eyes every evening. Cholecalciferol, Vitamin D3, (VITAMIN D3) 2,000 unit cap capsule Take 2,000 Units by mouth two (2) times a day.   Qty: 60 Cap, Refills: 2    Associated Diagnoses: Vitamin D deficiency         STOP taking these medications       acyclovir (ZOVIRAX) 400 mg tablet Comments:   Reason for Stopping:         lidocaine (XYLOCAINE) 5 % ointment Comments:   Reason for Stopping:         calcipotriene (DOVONEX) 0.005 % topical cream Comments:   Reason for Stopping:         nateglinide (STARLIX) 60 mg tablet Comments:   Reason for Stopping:               Activity: Activity as tolerated  Diet: Regular Diet    Follow-up:   F/u with PCP in 3-5 days      Time spent to discharge patient 35 minutes  Signed:   Tiffanie Keane DO  11/4/2020  12:50 PM

## 2020-11-04 NOTE — PROGRESS NOTES
Pt resting quietly in the bed,   Lungs sounds wet,  Unproductive cough per pt response. purewick working well to suction and is clear and yellow. No noted distress.   Bed is in low locked position and call light within reach

## 2020-11-05 ENCOUNTER — TELEPHONE (OUTPATIENT)
Dept: HOME HEALTH SERVICES | Facility: HOME HEALTH | Age: 80
End: 2020-11-05

## 2020-11-05 ENCOUNTER — PATIENT OUTREACH (OUTPATIENT)
Dept: CASE MANAGEMENT | Age: 80
End: 2020-11-05

## 2020-11-05 NOTE — TELEPHONE ENCOUNTER
[address] [place] [address] Craig Domingo Pharmacist consult. Mrs. Anderson Naidu was discharged from [facility] after having UTI and CHF. I spoke with her today. She is doing better. I reviewed her new discharge medications with her. She has new meds and is taking per orders. Since she is on two antibiotics, I cautioned her about side effects. She will take the Keflex for 4 days, but the doxycycline for 7 days. No questions at this time. I gave her my cell number and asked her to call me with any medication questions or issues. She said OK to call back any time.

## 2020-11-05 NOTE — PROGRESS NOTES
Patient was admitted to [facility] on 10/31/20 and discharged on 20 for Acute encephalopathy . Outreach made within 2 business days of discharge: Yes    Top Discharge Challenges to be reviewed by the provider   Additional needs identified to be addressed with provider no  none  Discussed COVID-19 related testing which was available at this time. Test results were negative. Patient informed of results, if available? yes   Method of communication with provider : none       Advance Care Planning:   Does patient have an Advance Directive:  health care decision makers updated    Inpatient Readmission Risk score: 15  Was this a readmission? no   Patient stated reason for the admission: n/a  Patients top risk factors for readmission: comorbidities  Interventions to address risk factors: n/a    LPN Care Coordinator contacted the patient by telephone to perform post hospital discharge assessment. Verified name and  with patient as identifiers. Provided introduction to self, and explanation of the CTN role. CTN reviewed discharge instructions, medical action plan and red flags with patient who verbalized understanding. Patient given an opportunity to ask questions and does not have any further questions or concerns at this time. The patient agrees to contact the PCP office for questions related to their healthcare. Medication reconciliation was declined by patient, who verbalizes understanding of administration of home medications. Referral to Pharm D needed: no     Home Health/Outpatient orders at discharge: PT and [address]: [facility]  Date of initial visit: 20 per [facility]; patient is waiting on call    Durable Medical Equipment ordered at discharge: patient has O2, walker, bsc, wc  [address]: n/a  Durable Medical Equipment received: n/a    Covid Risk Education    Patient has following risk factors of: heart failure, COPD and diabetes. Education provided regarding infection prevention, and signs and symptoms of COVID-19 and when to seek medical attention with patient who verbalized understanding. Discussed exposure protocols and quarantine From CDC: Are you at higher risk for severe illness?  and given an opportunity for questions and concerns. The patient agrees to contact the COVID-19 hotline [phone] or PCP office for questions related to COVID-19. For more information on steps you can take to protect yourself, see CDC's How to Protect Yourself     Patient/family/caregiver given information for [employer] and agrees to enroll no  Patient's preferred e-mail: declines  Patient's preferred phone number: declines    Discussed follow-up appointments. If no appointment was previously scheduled, appointment scheduling offered: yes  [facility] follow up appointment(s): No future appointments. Non-[facility] follow up appointment(s): Dr. Charlene Christine, patient stated she has called the office and is waiting on call back  Plan for follow-up call in 10-14 days based on severity of symptoms and risk factors. CC provided contact information for future needs. Goals Addressed                 This Visit's Progress     Attends follow-up appointments as directed.

## 2020-11-19 ENCOUNTER — PATIENT OUTREACH (OUTPATIENT)
Dept: CASE MANAGEMENT | Age: 80
End: 2020-11-19

## 2020-11-19 NOTE — PROGRESS NOTES
Attempted outreach follow up without success, unable to leave message. Patient had call out for follow up with Dr. Nany Mtz. HH referral sent to [email or web address]. No new needs are noted on chart review. Patient will be graduated from [employer] program.  Will reopen if call is returned.

## 2022-03-18 PROBLEM — R74.8 ELEVATED ALKALINE PHOSPHATASE LEVEL: Status: ACTIVE | Noted: 2018-07-18

## 2022-03-18 PROBLEM — B02.9 HERPES ZOSTER: Status: ACTIVE | Noted: 2018-06-15

## 2022-03-19 PROBLEM — D75.1 POLYCYTHEMIA SECONDARY TO HYPOXIA: Status: ACTIVE | Noted: 2020-11-03

## 2022-03-19 PROBLEM — M19.90 ARTHRITIS: Status: ACTIVE | Noted: 2018-07-18

## 2022-03-19 PROBLEM — I50.9 ACUTE CHF (CONGESTIVE HEART FAILURE) (HCC): Status: ACTIVE | Noted: 2020-10-31

## 2022-03-19 PROBLEM — N39.0 ACUTE UTI (URINARY TRACT INFECTION): Status: ACTIVE | Noted: 2020-10-31

## 2022-03-19 PROBLEM — R20.2 PARESTHESIA: Status: ACTIVE | Noted: 2018-06-15

## 2022-04-27 ENCOUNTER — APPOINTMENT (OUTPATIENT)
Dept: GENERAL RADIOLOGY | Age: 82
End: 2022-04-27
Attending: EMERGENCY MEDICINE
Payer: MEDICARE

## 2022-04-27 ENCOUNTER — HOSPITAL ENCOUNTER (OUTPATIENT)
Age: 82
Setting detail: OBSERVATION
Discharge: NURSING, REHAB OR LONG-TERM CARE | End: 2022-05-02
Attending: FAMILY MEDICINE | Admitting: INTERNAL MEDICINE
Payer: MEDICARE

## 2022-04-27 DIAGNOSIS — N10 ACUTE PYELONEPHRITIS: ICD-10-CM

## 2022-04-27 DIAGNOSIS — N17.9 ACUTE RENAL FAILURE, UNSPECIFIED ACUTE RENAL FAILURE TYPE (HCC): ICD-10-CM

## 2022-04-27 DIAGNOSIS — N39.0 ACUTE UTI (URINARY TRACT INFECTION): ICD-10-CM

## 2022-04-27 DIAGNOSIS — I50.9 ACUTE CONGESTIVE HEART FAILURE, UNSPECIFIED HEART FAILURE TYPE (HCC): ICD-10-CM

## 2022-04-27 DIAGNOSIS — A41.9 SEPTIC SHOCK (HCC): Primary | ICD-10-CM

## 2022-04-27 DIAGNOSIS — R65.21 SEPTIC SHOCK (HCC): Primary | ICD-10-CM

## 2022-04-27 DIAGNOSIS — J44.9 CHRONIC OBSTRUCTIVE PULMONARY DISEASE, UNSPECIFIED COPD TYPE (HCC): ICD-10-CM

## 2022-04-27 LAB
ALBUMIN SERPL-MCNC: 2.6 G/DL (ref 3.2–4.6)
ALBUMIN/GLOB SERPL: 0.5 {RATIO} (ref 1.2–3.5)
ALP SERPL-CCNC: 96 U/L (ref 50–136)
ALT SERPL-CCNC: 28 U/L (ref 12–65)
ANION GAP SERPL CALC-SCNC: 7 MMOL/L (ref 7–16)
AST SERPL-CCNC: 38 U/L (ref 15–37)
BACTERIA URNS QL MICRO: ABNORMAL /HPF
BASOPHILS # BLD: 0 K/UL (ref 0–0.2)
BASOPHILS NFR BLD: 0 % (ref 0–2)
BILIRUB SERPL-MCNC: 0.6 MG/DL (ref 0.2–1.1)
BUN SERPL-MCNC: 58 MG/DL (ref 8–23)
CALCIUM SERPL-MCNC: 8.8 MG/DL (ref 8.3–10.4)
CASTS URNS QL MICRO: ABNORMAL /LPF
CHLORIDE SERPL-SCNC: 105 MMOL/L (ref 98–107)
CK SERPL-CCNC: 176 U/L (ref 21–215)
CO2 SERPL-SCNC: 25 MMOL/L (ref 21–32)
CREAT SERPL-MCNC: 2.4 MG/DL (ref 0.6–1)
CRYSTALS URNS QL MICRO: 0 /LPF
DIFFERENTIAL METHOD BLD: ABNORMAL
EOSINOPHIL # BLD: 0.1 K/UL (ref 0–0.8)
EOSINOPHIL NFR BLD: 1 % (ref 0.5–7.8)
EPI CELLS #/AREA URNS HPF: ABNORMAL /HPF
ERYTHROCYTE [DISTWIDTH] IN BLOOD BY AUTOMATED COUNT: 13.8 % (ref 11.9–14.6)
FLUAV AG NPH QL IA: NEGATIVE
FLUBV AG NPH QL IA: NEGATIVE
GLOBULIN SER CALC-MCNC: 4.8 G/DL (ref 2.3–3.5)
GLUCOSE SERPL-MCNC: 117 MG/DL (ref 65–100)
HCT VFR BLD AUTO: 43.2 % (ref 35.8–46.3)
HGB BLD-MCNC: 14 G/DL (ref 11.7–15.4)
IMM GRANULOCYTES # BLD AUTO: 0.1 K/UL (ref 0–0.5)
IMM GRANULOCYTES NFR BLD AUTO: 1 % (ref 0–5)
LACTATE SERPL-SCNC: 3 MMOL/L (ref 0.4–2)
LYMPHOCYTES # BLD: 2.3 K/UL (ref 0.5–4.6)
LYMPHOCYTES NFR BLD: 15 % (ref 13–44)
MCH RBC QN AUTO: 31.4 PG (ref 26.1–32.9)
MCHC RBC AUTO-ENTMCNC: 32.4 G/DL (ref 31.4–35)
MCV RBC AUTO: 96.9 FL (ref 79.6–97.8)
MONOCYTES # BLD: 1.8 K/UL (ref 0.1–1.3)
MONOCYTES NFR BLD: 12 % (ref 4–12)
MUCOUS THREADS URNS QL MICRO: 0 /LPF
NEUTS SEG # BLD: 10.8 K/UL (ref 1.7–8.2)
NEUTS SEG NFR BLD: 71 % (ref 43–78)
NRBC # BLD: 0 K/UL (ref 0–0.2)
PLATELET # BLD AUTO: 178 K/UL (ref 150–450)
PMV BLD AUTO: 13.2 FL (ref 9.4–12.3)
POTASSIUM SERPL-SCNC: 4.9 MMOL/L (ref 3.5–5.1)
PROT SERPL-MCNC: 7.4 G/DL (ref 6.3–8.2)
RBC # BLD AUTO: 4.46 M/UL (ref 4.05–5.2)
RBC #/AREA URNS HPF: ABNORMAL /HPF
SODIUM SERPL-SCNC: 137 MMOL/L (ref 136–145)
SPECIMEN SOURCE: NORMAL
WBC # BLD AUTO: 15.2 K/UL (ref 4.3–11.1)
WBC URNS QL MICRO: >100 /HPF
YEAST URNS QL MICRO: ABNORMAL

## 2022-04-27 PROCEDURE — 82550 ASSAY OF CK (CPK): CPT

## 2022-04-27 PROCEDURE — 96375 TX/PRO/DX INJ NEW DRUG ADDON: CPT

## 2022-04-27 PROCEDURE — 81015 MICROSCOPIC EXAM OF URINE: CPT

## 2022-04-27 PROCEDURE — 87040 BLOOD CULTURE FOR BACTERIA: CPT

## 2022-04-27 PROCEDURE — 87804 INFLUENZA ASSAY W/OPTIC: CPT

## 2022-04-27 PROCEDURE — 80053 COMPREHEN METABOLIC PANEL: CPT

## 2022-04-27 PROCEDURE — 74011000250 HC RX REV CODE- 250: Performed by: EMERGENCY MEDICINE

## 2022-04-27 PROCEDURE — 85025 COMPLETE CBC W/AUTO DIFF WBC: CPT

## 2022-04-27 PROCEDURE — 96374 THER/PROPH/DIAG INJ IV PUSH: CPT

## 2022-04-27 PROCEDURE — 51702 INSERT TEMP BLADDER CATH: CPT

## 2022-04-27 PROCEDURE — 83605 ASSAY OF LACTIC ACID: CPT

## 2022-04-27 PROCEDURE — 96367 TX/PROPH/DG ADDL SEQ IV INF: CPT

## 2022-04-27 PROCEDURE — 71045 X-RAY EXAM CHEST 1 VIEW: CPT

## 2022-04-27 PROCEDURE — 74011000258 HC RX REV CODE- 258: Performed by: EMERGENCY MEDICINE

## 2022-04-27 PROCEDURE — 87088 URINE BACTERIA CULTURE: CPT

## 2022-04-27 PROCEDURE — 87086 URINE CULTURE/COLONY COUNT: CPT

## 2022-04-27 PROCEDURE — 87150 DNA/RNA AMPLIFIED PROBE: CPT

## 2022-04-27 PROCEDURE — 93005 ELECTROCARDIOGRAM TRACING: CPT | Performed by: EMERGENCY MEDICINE

## 2022-04-27 PROCEDURE — 87205 SMEAR GRAM STAIN: CPT

## 2022-04-27 PROCEDURE — 74011250636 HC RX REV CODE- 250/636: Performed by: EMERGENCY MEDICINE

## 2022-04-27 PROCEDURE — 87186 SC STD MICRODIL/AGAR DIL: CPT

## 2022-04-27 PROCEDURE — 96365 THER/PROPH/DIAG IV INF INIT: CPT

## 2022-04-27 PROCEDURE — 99285 EMERGENCY DEPT VISIT HI MDM: CPT

## 2022-04-27 PROCEDURE — 84145 PROCALCITONIN (PCT): CPT

## 2022-04-27 PROCEDURE — 96376 TX/PRO/DX INJ SAME DRUG ADON: CPT

## 2022-04-27 RX ORDER — VANCOMYCIN 2 GRAM/500 ML IN 0.9 % SODIUM CHLORIDE INTRAVENOUS
2000 ONCE
Status: COMPLETED | OUTPATIENT
Start: 2022-04-27 | End: 2022-04-28

## 2022-04-27 RX ORDER — SODIUM CHLORIDE, SODIUM LACTATE, POTASSIUM CHLORIDE, CALCIUM CHLORIDE 600; 310; 30; 20 MG/100ML; MG/100ML; MG/100ML; MG/100ML
1000 INJECTION, SOLUTION INTRAVENOUS CONTINUOUS
Status: DISCONTINUED | OUTPATIENT
Start: 2022-04-27 | End: 2022-04-28

## 2022-04-27 RX ORDER — NOREPINEPHRINE BITARTRATE/D5W 4MG/250ML
.5-16 PLASTIC BAG, INJECTION (ML) INTRAVENOUS
Status: DISCONTINUED | OUTPATIENT
Start: 2022-04-27 | End: 2022-04-29

## 2022-04-27 RX ADMIN — PIPERACILLIN AND TAZOBACTAM 4.5 G: 4; .5 INJECTION, POWDER, LYOPHILIZED, FOR SOLUTION INTRAVENOUS at 22:32

## 2022-04-27 RX ADMIN — VANCOMYCIN HYDROCHLORIDE 2000 MG: 10 INJECTION, POWDER, LYOPHILIZED, FOR SOLUTION INTRAVENOUS at 22:59

## 2022-04-27 RX ADMIN — SODIUM CHLORIDE, POTASSIUM CHLORIDE, SODIUM LACTATE AND CALCIUM CHLORIDE 1000 ML: 600; 310; 30; 20 INJECTION, SOLUTION INTRAVENOUS at 22:33

## 2022-04-27 RX ADMIN — SODIUM CHLORIDE, POTASSIUM CHLORIDE, SODIUM LACTATE AND CALCIUM CHLORIDE 1000 ML: 600; 310; 30; 20 INJECTION, SOLUTION INTRAVENOUS at 22:04

## 2022-04-27 RX ADMIN — SODIUM CHLORIDE, POTASSIUM CHLORIDE, SODIUM LACTATE AND CALCIUM CHLORIDE 859 ML: 600; 310; 30; 20 INJECTION, SOLUTION INTRAVENOUS at 22:58

## 2022-04-27 RX ADMIN — SODIUM CHLORIDE, SODIUM LACTATE, POTASSIUM CHLORIDE, AND CALCIUM CHLORIDE 1000 ML: 600; 310; 30; 20 INJECTION, SOLUTION INTRAVENOUS at 23:28

## 2022-04-27 RX ADMIN — NOREPINEPHRINE BITARTRATE 4 MCG/MIN: 1 SOLUTION INTRAVENOUS at 23:47

## 2022-04-28 ENCOUNTER — APPOINTMENT (OUTPATIENT)
Dept: ULTRASOUND IMAGING | Age: 82
End: 2022-04-28
Attending: INTERNAL MEDICINE
Payer: MEDICARE

## 2022-04-28 PROBLEM — A41.9 SEPTIC SHOCK (HCC): Status: ACTIVE | Noted: 2022-04-28

## 2022-04-28 PROBLEM — A41.9 SEPSIS (HCC): Status: ACTIVE | Noted: 2022-04-28

## 2022-04-28 PROBLEM — R65.21 SEPTIC SHOCK (HCC): Status: ACTIVE | Noted: 2022-04-28

## 2022-04-28 LAB
ALBUMIN SERPL-MCNC: 2.3 G/DL (ref 3.2–4.6)
ALBUMIN/GLOB SERPL: 0.6 {RATIO} (ref 1.2–3.5)
ALP SERPL-CCNC: 84 U/L (ref 50–136)
ALT SERPL-CCNC: 24 U/L (ref 12–65)
ANION GAP SERPL CALC-SCNC: 8 MMOL/L (ref 7–16)
AST SERPL-CCNC: 27 U/L (ref 15–37)
ATRIAL RATE: 88 BPM
BASOPHILS # BLD: 0 K/UL (ref 0–0.2)
BASOPHILS NFR BLD: 0 % (ref 0–2)
BILIRUB SERPL-MCNC: 0.4 MG/DL (ref 0.2–1.1)
BNP SERPL-MCNC: 639 PG/ML
BUN SERPL-MCNC: 52 MG/DL (ref 8–23)
CALCIUM SERPL-MCNC: 8.8 MG/DL (ref 8.3–10.4)
CALCULATED P AXIS, ECG09: 74 DEGREES
CALCULATED R AXIS, ECG10: -26 DEGREES
CALCULATED T AXIS, ECG11: 67 DEGREES
CHLORIDE SERPL-SCNC: 108 MMOL/L (ref 98–107)
CO2 SERPL-SCNC: 24 MMOL/L (ref 21–32)
CREAT SERPL-MCNC: 2 MG/DL (ref 0.6–1)
DIAGNOSIS, 93000: NORMAL
DIFFERENTIAL METHOD BLD: ABNORMAL
EOSINOPHIL # BLD: 0.1 K/UL (ref 0–0.8)
EOSINOPHIL NFR BLD: 1 % (ref 0.5–7.8)
ERYTHROCYTE [DISTWIDTH] IN BLOOD BY AUTOMATED COUNT: 13.6 % (ref 11.9–14.6)
GLOBULIN SER CALC-MCNC: 4.1 G/DL (ref 2.3–3.5)
GLUCOSE BLD STRIP.AUTO-MCNC: 101 MG/DL (ref 65–100)
GLUCOSE BLD STRIP.AUTO-MCNC: 112 MG/DL (ref 65–100)
GLUCOSE BLD STRIP.AUTO-MCNC: 123 MG/DL (ref 65–100)
GLUCOSE BLD STRIP.AUTO-MCNC: 93 MG/DL (ref 65–100)
GLUCOSE BLD STRIP.AUTO-MCNC: 98 MG/DL (ref 65–100)
GLUCOSE SERPL-MCNC: 117 MG/DL (ref 65–100)
HCT VFR BLD AUTO: 44.2 % (ref 35.8–46.3)
HGB BLD-MCNC: 13.6 G/DL (ref 11.7–15.4)
IMM GRANULOCYTES # BLD AUTO: 0.1 K/UL (ref 0–0.5)
IMM GRANULOCYTES NFR BLD AUTO: 1 % (ref 0–5)
LACTATE SERPL-SCNC: 1.1 MMOL/L (ref 0.4–2)
LACTATE SERPL-SCNC: 4.4 MMOL/L (ref 0.4–2)
LYMPHOCYTES # BLD: 1.9 K/UL (ref 0.5–4.6)
LYMPHOCYTES NFR BLD: 14 % (ref 13–44)
MCH RBC QN AUTO: 30.8 PG (ref 26.1–32.9)
MCHC RBC AUTO-ENTMCNC: 30.8 G/DL (ref 31.4–35)
MCV RBC AUTO: 100.2 FL (ref 79.6–97.8)
MONOCYTES # BLD: 1.7 K/UL (ref 0.1–1.3)
MONOCYTES NFR BLD: 13 % (ref 4–12)
NEUTS SEG # BLD: 9.1 K/UL (ref 1.7–8.2)
NEUTS SEG NFR BLD: 71 % (ref 43–78)
NRBC # BLD: 0 K/UL (ref 0–0.2)
P-R INTERVAL, ECG05: 176 MS
PLATELET # BLD AUTO: 142 K/UL (ref 150–450)
PMV BLD AUTO: 12.6 FL (ref 9.4–12.3)
POTASSIUM SERPL-SCNC: 4.2 MMOL/L (ref 3.5–5.1)
PROCALCITONIN SERPL-MCNC: 1.22 NG/ML (ref 0–0.49)
PROT SERPL-MCNC: 6.4 G/DL (ref 6.3–8.2)
Q-T INTERVAL, ECG07: 390 MS
QRS DURATION, ECG06: 139 MS
QTC CALCULATION (BEZET), ECG08: 470 MS
RBC # BLD AUTO: 4.41 M/UL (ref 4.05–5.2)
SERVICE CMNT-IMP: ABNORMAL
SERVICE CMNT-IMP: NORMAL
SERVICE CMNT-IMP: NORMAL
SODIUM SERPL-SCNC: 140 MMOL/L (ref 136–145)
TROPONIN-HIGH SENSITIVITY: 24.7 PG/ML (ref 0–14)
TROPONIN-HIGH SENSITIVITY: 25.4 PG/ML (ref 0–14)
VENTRICULAR RATE, ECG03: 87 BPM
WBC # BLD AUTO: 12.8 K/UL (ref 4.3–11.1)

## 2022-04-28 PROCEDURE — 74011000258 HC RX REV CODE- 258: Performed by: INTERNAL MEDICINE

## 2022-04-28 PROCEDURE — 94760 N-INVAS EAR/PLS OXIMETRY 1: CPT

## 2022-04-28 PROCEDURE — 97161 PT EVAL LOW COMPLEX 20 MIN: CPT

## 2022-04-28 PROCEDURE — 77030018842 HC SOL IRR SOD CL 9% BAXT -A

## 2022-04-28 PROCEDURE — G0378 HOSPITAL OBSERVATION PER HR: HCPCS

## 2022-04-28 PROCEDURE — 96367 TX/PROPH/DG ADDL SEQ IV INF: CPT

## 2022-04-28 PROCEDURE — 83880 ASSAY OF NATRIURETIC PEPTIDE: CPT

## 2022-04-28 PROCEDURE — 94664 DEMO&/EVAL PT USE INHALER: CPT

## 2022-04-28 PROCEDURE — 83605 ASSAY OF LACTIC ACID: CPT

## 2022-04-28 PROCEDURE — 86580 TB INTRADERMAL TEST: CPT | Performed by: INTERNAL MEDICINE

## 2022-04-28 PROCEDURE — 96372 THER/PROPH/DIAG INJ SC/IM: CPT

## 2022-04-28 PROCEDURE — 76770 US EXAM ABDO BACK WALL COMP: CPT

## 2022-04-28 PROCEDURE — 74011250636 HC RX REV CODE- 250/636: Performed by: PHYSICIAN ASSISTANT

## 2022-04-28 PROCEDURE — 80053 COMPREHEN METABOLIC PANEL: CPT

## 2022-04-28 PROCEDURE — 97535 SELF CARE MNGMENT TRAINING: CPT

## 2022-04-28 PROCEDURE — 84484 ASSAY OF TROPONIN QUANT: CPT

## 2022-04-28 PROCEDURE — 97165 OT EVAL LOW COMPLEX 30 MIN: CPT

## 2022-04-28 PROCEDURE — 74011000250 HC RX REV CODE- 250: Performed by: INTERNAL MEDICINE

## 2022-04-28 PROCEDURE — 87040 BLOOD CULTURE FOR BACTERIA: CPT

## 2022-04-28 PROCEDURE — 65270000029 HC RM PRIVATE

## 2022-04-28 PROCEDURE — 97112 NEUROMUSCULAR REEDUCATION: CPT

## 2022-04-28 PROCEDURE — 94640 AIRWAY INHALATION TREATMENT: CPT

## 2022-04-28 PROCEDURE — 74011250636 HC RX REV CODE- 250/636: Performed by: INTERNAL MEDICINE

## 2022-04-28 PROCEDURE — 85025 COMPLETE CBC W/AUTO DIFF WBC: CPT

## 2022-04-28 PROCEDURE — 36415 COLL VENOUS BLD VENIPUNCTURE: CPT

## 2022-04-28 PROCEDURE — 82962 GLUCOSE BLOOD TEST: CPT

## 2022-04-28 PROCEDURE — 2709999900 HC NON-CHARGEABLE SUPPLY

## 2022-04-28 PROCEDURE — 96366 THER/PROPH/DIAG IV INF ADDON: CPT

## 2022-04-28 PROCEDURE — 74011250637 HC RX REV CODE- 250/637: Performed by: INTERNAL MEDICINE

## 2022-04-28 PROCEDURE — 97530 THERAPEUTIC ACTIVITIES: CPT

## 2022-04-28 PROCEDURE — P9047 ALBUMIN (HUMAN), 25%, 50ML: HCPCS | Performed by: INTERNAL MEDICINE

## 2022-04-28 PROCEDURE — 77010033678 HC OXYGEN DAILY

## 2022-04-28 RX ORDER — HYDROCODONE BITARTRATE AND ACETAMINOPHEN 5; 325 MG/1; MG/1
1 TABLET ORAL
Status: DISCONTINUED | OUTPATIENT
Start: 2022-04-28 | End: 2022-05-02 | Stop reason: HOSPADM

## 2022-04-28 RX ORDER — HEPARIN SODIUM 5000 [USP'U]/ML
5000 INJECTION, SOLUTION INTRAVENOUS; SUBCUTANEOUS EVERY 8 HOURS
Status: DISCONTINUED | OUTPATIENT
Start: 2022-04-28 | End: 2022-05-02 | Stop reason: HOSPADM

## 2022-04-28 RX ORDER — SODIUM CHLORIDE 0.9 % (FLUSH) 0.9 %
5-40 SYRINGE (ML) INJECTION EVERY 8 HOURS
Status: DISCONTINUED | OUTPATIENT
Start: 2022-04-28 | End: 2022-05-02 | Stop reason: HOSPADM

## 2022-04-28 RX ORDER — ACETAMINOPHEN 325 MG/1
650 TABLET ORAL
Status: DISCONTINUED | OUTPATIENT
Start: 2022-04-28 | End: 2022-05-02 | Stop reason: HOSPADM

## 2022-04-28 RX ORDER — INSULIN LISPRO 100 [IU]/ML
INJECTION, SOLUTION INTRAVENOUS; SUBCUTANEOUS EVERY 6 HOURS
Status: DISCONTINUED | OUTPATIENT
Start: 2022-04-28 | End: 2022-04-28

## 2022-04-28 RX ORDER — SODIUM CHLORIDE 9 MG/ML
75 INJECTION, SOLUTION INTRAVENOUS CONTINUOUS
Status: DISCONTINUED | OUTPATIENT
Start: 2022-04-28 | End: 2022-05-01

## 2022-04-28 RX ORDER — ACETAMINOPHEN 650 MG/1
650 SUPPOSITORY RECTAL
Status: DISCONTINUED | OUTPATIENT
Start: 2022-04-28 | End: 2022-05-02 | Stop reason: HOSPADM

## 2022-04-28 RX ORDER — DIPHENHYDRAMINE HCL 25 MG
25 CAPSULE ORAL
Status: DISCONTINUED | OUTPATIENT
Start: 2022-04-28 | End: 2022-05-02 | Stop reason: HOSPADM

## 2022-04-28 RX ORDER — IPRATROPIUM BROMIDE AND ALBUTEROL SULFATE 2.5; .5 MG/3ML; MG/3ML
3 SOLUTION RESPIRATORY (INHALATION)
Status: DISCONTINUED | OUTPATIENT
Start: 2022-04-28 | End: 2022-05-02 | Stop reason: HOSPADM

## 2022-04-28 RX ORDER — INSULIN LISPRO 100 [IU]/ML
0-10 INJECTION, SOLUTION INTRAVENOUS; SUBCUTANEOUS
Status: DISCONTINUED | OUTPATIENT
Start: 2022-04-28 | End: 2022-05-02 | Stop reason: HOSPADM

## 2022-04-28 RX ORDER — ALBUMIN HUMAN 250 G/1000ML
25 SOLUTION INTRAVENOUS ONCE
Status: COMPLETED | OUTPATIENT
Start: 2022-04-28 | End: 2022-04-28

## 2022-04-28 RX ORDER — BUDESONIDE AND FORMOTEROL FUMARATE DIHYDRATE 160; 4.5 UG/1; UG/1
2 AEROSOL RESPIRATORY (INHALATION)
Refills: 11 | Status: DISCONTINUED | OUTPATIENT
Start: 2022-04-28 | End: 2022-04-28 | Stop reason: ALTCHOICE

## 2022-04-28 RX ORDER — CETIRIZINE HYDROCHLORIDE 5 MG/1
5 TABLET ORAL
Status: DISCONTINUED | OUTPATIENT
Start: 2022-04-28 | End: 2022-05-02 | Stop reason: HOSPADM

## 2022-04-28 RX ORDER — POLYETHYLENE GLYCOL 3350 17 G/17G
17 POWDER, FOR SOLUTION ORAL DAILY PRN
Status: DISCONTINUED | OUTPATIENT
Start: 2022-04-28 | End: 2022-05-02 | Stop reason: HOSPADM

## 2022-04-28 RX ORDER — SODIUM CHLORIDE 0.9 % (FLUSH) 0.9 %
5-40 SYRINGE (ML) INJECTION AS NEEDED
Status: DISCONTINUED | OUTPATIENT
Start: 2022-04-28 | End: 2022-05-02 | Stop reason: HOSPADM

## 2022-04-28 RX ORDER — GABAPENTIN 100 MG/1
100 CAPSULE ORAL 3 TIMES DAILY
Status: DISCONTINUED | OUTPATIENT
Start: 2022-04-28 | End: 2022-05-02 | Stop reason: HOSPADM

## 2022-04-28 RX ORDER — ATORVASTATIN CALCIUM 10 MG/1
10 TABLET, FILM COATED ORAL DAILY
Status: DISCONTINUED | OUTPATIENT
Start: 2022-04-28 | End: 2022-05-02 | Stop reason: HOSPADM

## 2022-04-28 RX ADMIN — CEFTRIAXONE 2 G: 2 INJECTION, POWDER, FOR SOLUTION INTRAMUSCULAR; INTRAVENOUS at 01:32

## 2022-04-28 RX ADMIN — TUBERCULIN PURIFIED PROTEIN DERIVATIVE 5 UNITS: 5 INJECTION, SOLUTION INTRADERMAL at 17:03

## 2022-04-28 RX ADMIN — SODIUM CHLORIDE 75 ML/HR: 9 INJECTION, SOLUTION INTRAVENOUS at 16:55

## 2022-04-28 RX ADMIN — SODIUM CHLORIDE, PRESERVATIVE FREE 10 ML: 5 INJECTION INTRAVENOUS at 22:20

## 2022-04-28 RX ADMIN — GABAPENTIN 100 MG: 100 CAPSULE ORAL at 08:22

## 2022-04-28 RX ADMIN — ATORVASTATIN CALCIUM 10 MG: 10 TABLET, FILM COATED ORAL at 08:22

## 2022-04-28 RX ADMIN — SODIUM CHLORIDE, PRESERVATIVE FREE 10 ML: 5 INJECTION INTRAVENOUS at 05:28

## 2022-04-28 RX ADMIN — HEPARIN SODIUM 5000 UNITS: 5000 INJECTION INTRAVENOUS; SUBCUTANEOUS at 17:03

## 2022-04-28 RX ADMIN — HEPARIN SODIUM 5000 UNITS: 5000 INJECTION INTRAVENOUS; SUBCUTANEOUS at 22:18

## 2022-04-28 RX ADMIN — SODIUM CHLORIDE, PRESERVATIVE FREE 10 ML: 5 INJECTION INTRAVENOUS at 02:58

## 2022-04-28 RX ADMIN — MOMETASONE FUROATE AND FORMOTEROL FUMARATE DIHYDRATE 2 PUFF: 200; 5 AEROSOL RESPIRATORY (INHALATION) at 09:14

## 2022-04-28 RX ADMIN — SODIUM CHLORIDE, PRESERVATIVE FREE 10 ML: 5 INJECTION INTRAVENOUS at 17:04

## 2022-04-28 RX ADMIN — GABAPENTIN 100 MG: 100 CAPSULE ORAL at 17:04

## 2022-04-28 RX ADMIN — GABAPENTIN 100 MG: 100 CAPSULE ORAL at 22:18

## 2022-04-28 RX ADMIN — MOMETASONE FUROATE AND FORMOTEROL FUMARATE DIHYDRATE 2 PUFF: 200; 5 AEROSOL RESPIRATORY (INHALATION) at 21:09

## 2022-04-28 RX ADMIN — HEPARIN SODIUM 5000 UNITS: 5000 INJECTION INTRAVENOUS; SUBCUTANEOUS at 05:27

## 2022-04-28 RX ADMIN — ALBUMIN (HUMAN) 25 G: 0.25 INJECTION, SOLUTION INTRAVENOUS at 05:27

## 2022-04-28 NOTE — PROGRESS NOTES
TRANSFER - IN REPORT:    Verbal report received from COLETTE Ambrose (name) on Brent Cornejo  being received from ER (unit) for routine progression of care      Report consisted of patients Situation, Background, Assessment and   Recommendations(SBAR). Information from the following report(s) SBAR, Kardex, ED Summary, Procedure Summary, Intake/Output, MAR, Recent Results, Med Rec Status, Cardiac Rhythm NSR with BBB and Alarm Parameters  was reviewed with the receiving nurse. Opportunity for questions and clarification was provided. Assessment completed upon patients arrival to unit and care assumed.

## 2022-04-28 NOTE — ED PROVIDER NOTES
48-year-old female with history of diabetes, hypertension, COPD on 5 L home oxygen, shingles, arthritis presents with 3 days of body aches and generalized weakness. Symptoms worsened last night. She has had 1 week of burning with urination so as a result, has had decreased urination. She denies fever, nausea, vomiting, chest pain, shortness of breath, abdominal pain, diarrhea, blood in stools, ill contacts, cough, congestion, exposure to COVID. She has had COVID-vaccine. Generalized Body Aches  Pertinent negatives include no chest pain, no abdominal pain, no headaches and no shortness of breath. Past Medical History:   Diagnosis Date    Arthritis     Diabetes ([address]     Hypertension     Shingles        No past surgical history on file. No family history on file. Social History     Socioeconomic History    Marital status: SINGLE     Spouse name: Not on file    Number of children: Not on file    Years of education: Not on file    Highest education level: Not on file   Occupational History    Not on file   Tobacco Use    Smoking status: Current Some Day Smoker    Smokeless tobacco: Never Used   Substance and Sexual Activity    Alcohol use: No    Drug use: No    Sexual activity: Not on file   Other Topics Concern    Not on file   Social History Narrative    Not on file     Social Determinants of Health     Financial Resource Strain:     Difficulty of Paying Living Expenses: Not on file   Food Insecurity:     Worried About Running Out of Food in the Last Year: Not on file    Cecilio of Food in the Last Year: Not on file   Transportation Needs:     Lack of Transportation (Medical): Not on file    Lack of Transportation (Non-Medical):  Not on file   Physical Activity:     Days of Exercise per Week: Not on file    Minutes of Exercise per Session: Not on file   Stress:     Feeling of Stress : Not on file   Social Connections:     Frequency of Communication with Friends and Family: Not on file    Frequency of Social Gatherings with Friends and Family: Not on file    Attends Alevism Services: Not on file    Active Member of Clubs or Organizations: Not on file    Attends Club or Organization Meetings: Not on file    Marital Status: Not on file   Intimate Partner Violence:     Fear of Current or Ex-Partner: Not on file    Emotionally Abused: Not on file    Physically Abused: Not on file    Sexually Abused: Not on file   Housing Stability:     Unable to Pay for Housing in the Last Year: Not on file    Number of [place] in the Last Year: Not on file    Unstable Housing in the Last Year: Not on file         ALLERGIES: Chocolate flavor and Triamcinolone acetonide    Review of Systems   Constitutional: Positive for appetite change and fatigue. Negative for fever. HENT: Negative for hearing loss. Eyes: Negative for visual disturbance. Respiratory: Negative for cough and shortness of breath. Cardiovascular: Negative for chest pain. Gastrointestinal: Negative for abdominal pain, diarrhea, nausea and vomiting. Genitourinary: Positive for decreased urine volume and dysuria. Musculoskeletal: Positive for arthralgias, back pain and myalgias. Skin: Negative for rash. Neurological: Positive for weakness. Negative for headaches. Psychiatric/Behavioral: Negative for confusion. All other systems reviewed and are negative. Vitals:    04/27/22 2100   BP: (!) 199/110   Pulse: 97   Resp: 20   Temp: 99.5 °F (37.5 °C)   Weight: 95.3 kg (210 lb)   Height: 5' 3\" (1.6 m)            Physical Exam  Vitals and nursing note reviewed. Constitutional:       Appearance: Normal appearance. She is well-developed. She is obese. She is ill-appearing. HENT:      Head: Normocephalic and atraumatic. Nose: Nose normal.      Mouth/Throat:      Mouth: Mucous membranes are moist.   Eyes:      Pupils: Pupils are equal, round, and reactive to light.    Cardiovascular:      Rate and Rhythm: Regular rhythm. Heart sounds: Normal heart sounds. Pulmonary:      Effort: Pulmonary effort is normal.      Breath sounds: Normal breath sounds. Abdominal:      Palpations: Abdomen is soft. Tenderness: There is abdominal tenderness. Musculoskeletal:         General: No deformity. Normal range of motion. Cervical back: Normal range of motion and neck supple. Comments: Diffuse muscle tenderness   Skin:     General: Skin is warm and dry. Neurological:      General: No focal deficit present. Mental Status: She is alert. Mental status is at baseline. Psychiatric:         Mood and Affect: Mood normal.         Behavior: Behavior normal.          MDM  Number of Diagnoses or Management Options  Acute pyelonephritis  Acute renal failure, unspecified acute renal failure type ([address]  Septic shock (HCC)  Diagnosis management comments: Parts of this document were created using dragon voice recognition software. The chart has been reviewed but errors may still be present. I wore appropriate PPE throughout this patient's ED visit. Smith Trinidad MD, 9:53 PM    Blood pressure in triage 199/110. Blood pressure rechecked both arms upon arrival to the room was 61/47.    11:27 PM  Finishing 3rd liter and BP back down to 14'Q systolic. Levophed ordered. dw intensivist for admission. 12:45 AM  Lactic increasing    Sepsis Reassessment note:     Chel Christinason meets criteria for Sepsis -    Patient is receiving IV Fluids and Antibiotics per sepsis protocol. I did the Sepsis Reassessment at 12:46 AM 04/28/22.     Smith Trinidad MD          Amount and/or Complexity of Data Reviewed  Clinical lab tests: reviewed and ordered (Results for orders placed or performed during the hospital encounter of 04/27/22  -INFLUENZA A & B AG (RAPID TEST):        Result                      Value             Ref Range           Influenza A Ag              Negative          NEG                 Influenza B Ag              Negative          NEG                 Source                                                        Nasopharyngeal  -CBC WITH AUTOMATED DIFF:        Result                      Value             Ref Range           WBC                         15.2 (H)          4.3 - 11.1 K*       RBC                         4.46              4.05 - 5.2 M*       HGB                         14.0              11.7 - 15.4 *       HCT                         43.2              35.8 - 46.3 %       MCV                         96.9              79.6 - 97.8 *       MCH                         31.4              26.1 - 32.9 *       MCHC                        32.4              31.4 - 35.0 *       RDW                         13.8              11.9 - 14.6 %       PLATELET                    178               150 - 450 K/*       MPV                         13.2 (H)          9.4 - 12.3 FL       ABSOLUTE NRBC               0.00              0.0 - 0.2 K/*       DF                          AUTOMATED                             NEUTROPHILS                 71                43 - 78 %           LYMPHOCYTES                 15                13 - 44 %           MONOCYTES                   12                4.0 - 12.0 %        EOSINOPHILS                 1                 0.5 - 7.8 %         BASOPHILS                   0                 0.0 - 2.0 %         IMMATURE GRANULOCYTES       1                 0.0 - 5.0 %         ABS. NEUTROPHILS            10.8 (H)          1.7 - 8.2 K/*       ABS. LYMPHOCYTES            2.3               0.5 - 4.6 K/*       ABS. MONOCYTES              1.8 (H)           0.1 - 1.3 K/*       ABS. EOSINOPHILS            0.1               0.0 - 0.8 K/*       ABS. BASOPHILS              0.0               0.0 - 0.2 K/*       ABS. IMM.  GRANS.            0.1               0.0 - 0.5 K/*  -METABOLIC PANEL, COMPREHENSIVE:        Result                      Value             Ref Range           Sodium                      137 136 - 145 mm*       Potassium                   4.9               3.5 - 5.1 mm*       Chloride                    105               98 - 107 mmo*       CO2                         25                21 - 32 mmol*       Anion gap                   7                 7 - 16 mmol/L       Glucose                     117 (H)           65 - 100 mg/*       BUN                         58 (H)            8 - 23 MG/DL        Creatinine                  2.40 (H)          0.6 - 1.0 MG*       GFR est AA                  25 (L)            >60 ml/min/1*       GFR est non-AA              20 (L)            >60 ml/min/1*       Calcium                     8.8               8.3 - 10.4 M*       Bilirubin, total            0.6               0.2 - 1.1 MG*       ALT (SGPT)                  28                12 - 65 U/L         AST (SGOT)                  38 (H)            15 - 37 U/L         Alk.  phosphatase            96                50 - 136 U/L        Protein, total              7.4               6.3 - 8.2 g/*       Albumin                     2.6 (L)           3.2 - 4.6 g/*       Globulin                    4.8 (H)           2.3 - 3.5 g/*       A-G Ratio                   0.5 (L)           1.2 - 3.5      -LACTIC ACID:        Result                      Value             Ref Range           Lactic acid                 3.0 (H)           0.4 - 2.0 MM*  -URINE MICROSCOPIC:        Result                      Value             Ref Range           WBC                         >100              0 /hpf              RBC                         10-20             0 /hpf              Epithelial cells            3-5               0 /hpf              Bacteria                    4+ (H)            0 /hpf              Casts                       HYALINE           0 /lpf              Crystals, urine             0                 0 /LPF              Mucus                       0                 0 /lpf              Yeast OCCASIONAL                       -CK:        Result                      Value             Ref Range           CK                          176               21 - 215 U/L   -EKG, 12 LEAD, INITIAL:        Result                      Value             Ref Range           Ventricular Rate            87                BPM                 Atrial Rate                 88                BPM                 P-R Interval                176               ms                  QRS Duration                139               ms                  Q-T Interval                390               ms                  QTC Calculation (Bezet)     470               ms                  Calculated P Axis           74                degrees             Calculated R Axis           -26               degrees             Calculated T Axis           67                degrees             Diagnosis                                                     Sinus rhythm   Ventricular premature complex   Right bundle branch block   Inferior infarct, acute   Lateral leads are also involved   >>> Acute MI <<<     )  Tests in the radiology section of CPT®: ordered and reviewed (XR CHEST PORT    Result Date: 4/27/2022  EXAM: Chest x-ray. INDICATION: Chest pain. COMPARISON: Prior chest x-ray November 3, 2020. TECHNIQUE: Single frontal view. FINDINGS: The lungs are clear. The heart is upper normal in size. No pneumothorax, vascular congestion or pleural effusion is seen. There is no acute osseous abnormality.      No acute process.    )  Tests in the medicine section of CPT®: ordered and reviewed           EKG    Date/Time: 4/27/2022 9:53 PM  Performed by: Shaina Hirsch MD  Authorized by: Shaina Hirsch MD     ECG reviewed by ED Physician in the absence of a cardiologist: yes    Previous ECG:     Previous ECG:  Compared to current    Comparison ECG info:  2018    Similarity:  Changes noted  Interpretation:     Interpretation: abnormal    Rate:     ECG rate:  84 ECG rate assessment: normal    Rhythm:     Rhythm: sinus rhythm    Ectopy:     Ectopy: PVCs    Conduction:     Conduction: abnormal      Abnormal conduction: complete RBBB    ST segments:     ST segments:  Non-specific  T waves:     T waves: non-specific      Critical Care  Performed by: Laurita Baumgarten, MD  Authorized by: Laurita Baumgarten, MD     Critical care provider statement:     Critical care time (minutes):  60    Critical care time was exclusive of:  Separately billable procedures and treating other patients    Critical care was necessary to treat or prevent imminent or life-threatening deterioration of the following conditions:  Circulatory failure and sepsis    Critical care was time spent personally by me on the following activities:  Blood draw for specimens, development of treatment plan with patient or surrogate, discussions with consultants, evaluation of patient's response to treatment, examination of patient, ordering and performing treatments and interventions, ordering and review of laboratory studies, ordering and review of radiographic studies, pulse oximetry, re-evaluation of patient's condition and review of old charts    I assumed direction of critical care for this patient from another provider in my specialty: yes

## 2022-04-28 NOTE — ED NOTES
TRANSFER - OUT REPORT:    Verbal report given to ICU RN (name) on Manjit Stallworth  being transferred to ICU  Room 3111 (unit) for routine progression of care       Report consisted of patients Situation, Background, Assessment and   Recommendations(SBAR). Information from the following report(s) SBAR was reviewed with the receiving nurse. Lines:   Peripheral IV 04/27/22 Right Hand (Active)   Site Assessment Clean, dry, & intact 04/27/22 2156   Phlebitis Assessment 0 04/27/22 2156   Infiltration Assessment 0 04/27/22 2156   Dressing Status Clean, dry, & intact 04/27/22 2156   Dressing Type Transparent 04/27/22 2156   Hub Color/Line Status Green 04/27/22 2156       Peripheral IV 04/27/22 Left Hand (Active)   Site Assessment Clean, dry, & intact 04/27/22 2223   Phlebitis Assessment 0 04/27/22 2223   Infiltration Assessment 0 04/27/22 2223   Dressing Status Clean, dry, & intact 04/27/22 2223        Opportunity for questions and clarification was provided.       Patient transported with:   Registered Nurse

## 2022-04-28 NOTE — H&P
History and Physical    Patient: Alejandro Reza MRN: [id number]  SSN: [id number]    YOB: 1940  Age: 79 y.o. Sex: female      Subjective:      Alejandro Reza is a 79 y.o. female with a past medical history significant for type 2 diabetes mellitus, O2 dependent chronic obstructive pulmonary disease presents with chief complaint of generalized weakness. She endorses a 1 week history of dysuria. Upon arrival to the emergency department the patient found profoundly hypotensive. Routine screening labs revealed a urine analysis remarkable for pyuria and hematuria bacteria, leukocytosis and lactic acidemia. The patient was diagnosed with septic shock due to urinary tract infection and referred for ICU admission critical care consultation. .     Past Medical History:   Diagnosis Date    Arthritis     Diabetes ([address]     Hypertension     Shingles      No past surgical history on file. No family history on file. Social History     Tobacco Use    Smoking status: Current Some Day Smoker    Smokeless tobacco: Never Used   Substance Use Topics    Alcohol use: No      Prior to Admission medications    Medication Sig Start Date End Date Taking? Authorizing Provider   tiotropium bromide (SPIRIVA RESPIMAT) 2.5 mcg/actuation inhaler Take 2 Puffs by inhalation daily. Indications: bronchospasm prevention with COPD 11/5/20   Guzman, Thu, DO   gabapentin (NEURONTIN) 100 mg capsule Take 1 Cap by mouth three (3) times daily. 11/4/20   Guzman, Thu, DO   traMADol (ULTRAM) 50 mg tablet Take 1 Tab by mouth every eight (8) hours as needed for Pain. Max Daily Amount: 150 mg. 8/31/18   Owen Mitchell MD   BREO ELLIPTA 100-25 mcg/dose inhaler INHALE 1 PUFF BY MOUTH EVERY DAY 6/26/18   Provider, Historical   lovastatin (MEVACOR) 40 mg tablet Take 40 mg by mouth nightly. Provider, Historical   losartan (COZAAR) 25 mg tablet Take  by mouth daily.     Provider, Historical   travoprost (TRAVATAN Z) 0.004 % ophthalmic solution Administer 1 Drop to both eyes every evening. Provider, Kanu   Cholecalciferol, Vitamin D3, (VITAMIN D3) 2,000 unit cap capsule Take 2,000 Units by mouth two (2) times a day. 6/15/18   Christopher Richardson MD        Allergies   Allergen Reactions    Chocolate Flavor Angioedema    Triamcinolone Acetonide Nausea Only       Review of Systems:  As above with all others negative    Objective:     Vitals:    04/27/22 2300 04/27/22 2308 04/27/22 2310 04/27/22 2325   BP: (!) 85/41 (!) 69/28 91/62 (!) 89/42   Pulse: 81 81 84 74   Resp: 17 30 17 17   Temp:       SpO2:       Weight:       Height:            Physical Exam:  General: Awake alert in no acute distress lying in a gurney in the emergency department  HEENT: Mucous membranes moist sclera are clear and anicteric  Neck: Supple nontender  Heart: Regular subtle systolic murmur  Lungs: Clear with no wheezing  Abdomen: Benign nontender  Skin: No significant rash or breakdown  Neuro: Subtly confused and forgetful but follows simple commands with no focal motor deficit    Assessment:     Hospital Problems  Date Reviewed: 8/31/2018    None          Plan:     Impression:  Septic shock  Urinary tract infection  Acute kidney injury  Type 2 diabetes mellitus  Home O2 dependent chronic obstructive pulmonary disease    Plan:  Vancomycin and Zosyn administered in the emergency department, Rocephin monotherapy pending culture data.   Adequately resuscitated, pressors and inotropes prescribed to maintain adequate mean arterial blood pressures  Follow-up lactic acid and echocardiogram  Supplemental insulin to maintain adequate glycemic control  Rodriguez catheter for strict I's and O's    Critical care 35 minutes independent of procedures performed    Signed By: Adrienne Rahman DO     April 28, 2022

## 2022-04-28 NOTE — ACP (ADVANCE CARE PLANNING)
Advance Care Planning     General Advance Care Planning (ACP) Conversation      Date of Conversation: 4/27/2022      Healthcare Decision Maker: Pt single, has 5 children. Lives with son, \"Milton\". No current LW/HCPOA on file. Children, all or majority are legal NOK if pt can not make decisions for self unless document presented stating otherwise. No healthcare decision makers have been documented. Click here to complete [address] including selection of the Healthcare Decision Maker Relationship (ie \"Primary\")    Today we documented Decision Maker(s) consistent with Legal Next of Kin hierarchy. Content/Action Overview:   No document on file at present. Full code per MD orders.         Length of Voluntary ACP Conversation in minutes:  <16 minutes (Non-Billable)    Uma Clemens RN

## 2022-04-28 NOTE — CONSULTS
[facility]   Admit Date:  2022  9:34 PM   Name:  Angella Aiken   Age:  79 y.o. Sex:  female  :  1940   MRN:  [id number]   Room:  [facility]/01    Presenting Complaint: Generalized Body Aches    Reason(s) for Admission: Sepsis [facility]) [A41.9]     Hospitalists consulted by Bessy Shultz MD for: sepsis, assuming care out of ICU    History of Presenting Illness:   Angella Aiken is a 79 y.o. female with history of COPD, chronic hypoxic resp failure (5L NC continous), HTN, morbid obesity, hx CVA, diet controlled diabetes mellitus, hx of nephrolithiasis, remote shingles who presented to the ER c/o dysuria x 1 week along with body aches, generalized weakness. In the ER pt's initial BP was 199/110 but upon recheck, BP was 61/47. Pt did have a low-grade fever of 99.5 and elevated WBC count and lactic acid levels. Despite IVF boluses, pt remained hypotensive and therefore levophed gtt was initiated and pulmonary critical care consulted for ICU admission for septic shock. Cxray without gross infiltrates / pulmonary edema. Pt was given vanco-zosyn in the ER and then started on rocephin in the ICU. She was weaned off IV pressors overnight and was transferred out of the ICU thereafter. [facility] Hospitalist service consulted to assume care. Review of Systems:  Admitted to body aches, chills, weakness and intermittent flank pain. Denies N/V, syncope, worsening dyspnea, cough, hematuria, anuria.     Assessment & Plan:     Principal Problem:    Septic shock  - resolving  - attributed to urinary process (hx of e coli / aerococcus viridans UTI)  - closely monitor BP, gentle IVF (off pressors)    Active Problems:    Sepsis secondary to UTI  - cont empiric rocephin  - f/u ucx / bcx  - f/u AM CBC / lactic acid       Lactic acidosis  - in setting of sepsis  - cont IVF, empiric abx  - f/u AM levels      ATN / GILMAR  - avoid nephrotoxic rx  - cont gentle IVF  - f/u AM BMP      Chronic hypoxic resp failure   - cont supplemental O2; again home O2 requirements 5L via NC      COPD / reformed cigarette smoker  - add flutter valve and scheduled ROGELIO nebs  - cont ICS-LABA      Morbid obesity  - adds to complexity   - encourage wt loss      Diet controlled diabetes mellitus  - last hgbA1c 5.5   - closely monitor; low-carb diet      Essential hypertension  - antihypertensive agent on hold in setting of septic shock  - closely monitor BP      Remote shingles  - cont neurontin tid for neuralgia  - prn norco with parameters to hold if SBP <100mmhg      Debility   - appreciate PT/OT eval, recommending STR      Discharge Planning:    - anticipate at least 3-4 midnight hospitalization    Diet:  ADULT DIET Regular; 3 carb choices (45 gm/meal)  DVT PPx: Hep sc  Code status: Full Code    Hospital Problems as of 4/28/2022 Date Reviewed: 8/31/2018          Codes Class Noted - Resolved POA    * (Principal) Sepsis ([facility] ICD-10-CM: A41.9  ICD-9-CM: 038.9, 995.91  4/28/2022 - Present Unknown        Acute UTI (urinary tract infection) ICD-10-CM: N39.0  ICD-9-CM: 599.0  10/31/2020 - Present Yes        Chronic obstructive lung disease ([facility] ICD-10-CM: J44.9  ICD-9-CM: 554  9/18/2014 - Present Yes        Obstructive sleep apnea syndrome ICD-10-CM: G47.33  ICD-9-CM: 327.23  10/4/2013 - Present Yes        Essential hypertension ICD-10-CM: I10  ICD-9-CM: 401.9  9/18/2012 - Present Yes              Past History:  Past Medical History:   Diagnosis Date    Arthritis     [facility]     Hypertension     Shingles       No past surgical history on file. Allergies   Allergen Reactions    Chocolate Flavor Angioedema    Triamcinolone Acetonide Nausea Only      Social History     Tobacco Use    Smoking status: Current Some Day Smoker    Smokeless tobacco: Never Used   Substance Use Topics    Alcohol use: No      No family history on file. Family history reviewed and negative except as otherwise noted.     Immunization History Administered Date(s) Administered    Influenza Vaccine (Quad) PF (>6 Mo Flulaval, Fluarix, and >3 Yrs Afluria, Fluzone 48874) 11/02/2020     Current Facility-Administered Medications   Medication Dose Route Frequency    sodium chloride (NS) flush 5-40 mL  5-40 mL IntraVENous Q8H    sodium chloride (NS) flush 5-40 mL  5-40 mL IntraVENous PRN    acetaminophen (TYLENOL) tablet 650 mg  650 mg Oral Q6H PRN    Or    acetaminophen (TYLENOL) suppository 650 mg  650 mg Rectal Q6H PRN    polyethylene glycol (MIRALAX) packet 17 g  17 g Oral DAILY PRN    cefTRIAXone (ROCEPHIN) 2 g in 0.9% sodium chloride (MBP/ADV) 50 mL MBP  2 g IntraVENous Q24H    heparin (porcine) injection 5,000 Units  5,000 Units SubCUTAneous Q8H    gabapentin (NEURONTIN) capsule 100 mg  100 mg Oral TID    atorvastatin (LIPITOR) tablet 10 mg  10 mg Oral DAILY    mometasone-formoterol (DULERA) 200mcg-5mcg/puff  2 Puff Inhalation BID RT    insulin lispro (HUMALOG) injection 0-10 Units  0-10 Units SubCUTAneous AC&HS    tuberculin injection 5 Units  5 Units IntraDERMal ONCE    0.9% sodium chloride infusion  75 mL/hr IntraVENous CONTINUOUS    NOREPINephrine (LEVOPHED) 4 mg in 5% dextrose 250 mL infusion  0.5-16 mcg/min IntraVENous TITRATE       Objective:     Patient Vitals for the past 24 hrs:   Temp Pulse Resp BP SpO2   04/28/22 1142 98 °F (36.7 °C) 77 19 (!) 93/49 98 %   04/28/22 1034 -- 74 19 (!) 117/56 --   04/28/22 1003 -- 76 (!) 48 (!) 109/55 96 %   04/28/22 0941 -- 80 21 -- 97 %   04/28/22 0930 -- -- -- (!) 108/50 --   04/28/22 0915 -- 79 16 (!) 99/49 94 %   04/28/22 0914 -- -- -- -- 96 %   04/28/22 0900 -- 76 17 (!) 109/53 94 %   04/28/22 0815 -- 71 20 (!) 103/50 91 %   04/28/22 0800 -- 75 21 (!) 125/58 92 %   04/28/22 0745 -- 75 18 (!) 112/55 (!) 88 %   04/28/22 0730 -- 75 15 (!) 114/52 96 %   04/28/22 0715 97.8 °F (36.6 °C) 73 21 (!) 112/55 100 %   04/28/22 0700 -- 74 21 (!) 100/49 100 %   04/28/22 0649 -- 73 20 (!) 100/54 100 % 04/28/22 0634 -- 71 22 (!) 114/51 100 %   04/28/22 0619 -- 71 21 (!) 98/47 98 %   04/28/22 0604 -- 69 21 (!) 100/51 98 %   04/28/22 0549 -- 70 29 (!) 112/55 97 %   04/28/22 0534 -- 71 30 (!) 117/56 95 %   04/28/22 0519 -- 74 17 (!) 124/55 92 %   04/28/22 0504 -- 72 16 (!) 95/49 95 %   04/28/22 0449 -- 74 18 (!) 97/50 94 %   04/28/22 0434 -- 77 19 (!) 92/52 94 %   04/28/22 0419 -- 75 23 (!) 88/48 97 %   04/28/22 0404 -- 76 19 (!) 97/50 95 %   04/28/22 0400 -- 74 -- -- --   04/28/22 0349 -- 77 (!) 39 (!) 85/68 94 %   04/28/22 0334 -- 76 23 (!) 91/52 94 %   04/28/22 0319 -- 75 19 (!) 124/58 96 %   04/28/22 0304 -- 76 17 (!) 113/58 95 %   04/28/22 0249 -- 76 14 (!) 143/60 94 %   04/28/22 0238 -- 75 15 (!) 126/58 92 %   04/28/22 0223 -- 79 16 (!) 110/58 90 %   04/28/22 0210 97.8 °F (36.6 °C) 80 20 (!) 102/52 --   04/28/22 0208 -- 81 17 (!) 102/52 (!) 86 %   04/28/22 0153 97.8 °F (36.6 °C) 79 22 (!) 102/57 91 %   04/28/22 0108 -- 74 18 (!) 107/56 94 %   04/28/22 0053 -- 78 18 (!) 108/55 96 %   04/28/22 0038 -- 76 18 (!) 109/56 99 %   04/28/22 0023 -- 77 18 120/80 97 %   04/28/22 0008 -- 73 16 (!) 121/54 97 %   04/27/22 2353 -- 79 28 116/69 (!) 79 %   04/27/22 2338 -- 79 14 (!) 91/49 --   04/27/22 2325 -- 74 17 (!) 89/42 --   04/27/22 2310 -- 84 17 91/62 --   04/27/22 2308 -- 81 30 (!) 69/28 --   04/27/22 2300 -- 81 17 (!) 85/41 --   04/27/22 2245 -- 81 15 (!) 85/50 --   04/27/22 2230 -- 80 20 (!) 87/43 --   04/27/22 2215 -- 85 14 (!) 152/128 --   04/27/22 2200 -- 83 16 (!) 62/47 100 %   04/27/22 2145 -- -- -- (!) 61/47 96 %   04/27/22 2100 99.5 °F (37.5 °C) 97 20 (!) 199/110 --     Oxygen Therapy  O2 Sat (%): 98 % (04/28/22 1142)  Pulse via Oximetry: 75 beats per minute (04/28/22 1003)  O2 Device: Nasal cannula (04/28/22 0914)  Skin Assessment: Clean, dry, & intact (04/28/22 0715)  Skin Protection for O2 Device: N/A (04/28/22 0715)  O2 Flow Rate (L/min): 5 l/min (04/28/22 0914)    Estimated body mass index is 38.97 kg/m² as calculated from the following:    Height as of this encounter: 5' 3\" (1.6 m). Weight as of this encounter: 99.8 kg (220 lb 0.3 oz). Intake/Output Summary (Last 24 hours) at 4/28/2022 1421  Last data filed at 4/28/2022 1350  Gross per 24 hour   Intake 2052.06 ml   Output 1400 ml   Net 652.06 ml         Physical Exam:    Blood pressure (!) 93/49, pulse 77, temperature 98 °F (36.7 °C), resp. rate 19, height 5' 3\" (1.6 m), weight 99.8 kg (220 lb 0.3 oz), SpO2 98 %, not currently breastfeeding. General:    Morbidly obese, well developed. No overt distress  Head:  Normocephalic, atraumatic  Eyes:  Sclerae appear normal.  Pupils equally round. ENT:  Nares appear normal, no drainage. Moist oral mucosa  Neck:  No restricted ROM. Trachea midline   CV:   RRR. No m/r/g. No jugular venous distension. Lungs:   Rhonchi b/l, no wheezing, no accessory muscles used  Abdomen: Bowel sounds present. Soft, nontender, nondistended. Extremities: No cyanosis or clubbing.  +mvmt x 4  Skin:     No rashes and normal coloration. Warm and dry. Neuro:  CN II-XII grossly intact. Sensation intact. A&Ox3  Psych:  Normal mood and affect. I have reviewed ordered lab tests and independently visualized imaging below:    Recent Labs:  Recent Results (from the past 48 hour(s))   CBC WITH AUTOMATED DIFF    Collection Time: 04/27/22  9:08 PM   Result Value Ref Range    WBC 15.2 (H) 4.3 - 11.1 K/uL    RBC 4.46 4.05 - 5.2 M/uL    HGB 14.0 11.7 - 15.4 g/dL    HCT 43.2 35.8 - 46.3 %    MCV 96.9 79.6 - 97.8 FL    MCH 31.4 26.1 - 32.9 PG    MCHC 32.4 31.4 - 35.0 g/dL    RDW 13.8 11.9 - 14.6 %    PLATELET 690 200 - 161 K/uL    MPV 13.2 (H) 9.4 - 12.3 FL    ABSOLUTE NRBC 0.00 0.0 - 0.2 K/uL    DF AUTOMATED      NEUTROPHILS 71 43 - 78 %    LYMPHOCYTES 15 13 - 44 %    MONOCYTES 12 4.0 - 12.0 %    EOSINOPHILS 1 0.5 - 7.8 %    BASOPHILS 0 0.0 - 2.0 %    IMMATURE GRANULOCYTES 1 0.0 - 5.0 %    ABS.  NEUTROPHILS 10.8 (H) 1.7 - 8.2 K/UL    ABS. LYMPHOCYTES 2.3 0.5 - 4.6 K/UL    ABS. MONOCYTES 1.8 (H) 0.1 - 1.3 K/UL    ABS. EOSINOPHILS 0.1 0.0 - 0.8 K/UL    ABS. BASOPHILS 0.0 0.0 - 0.2 K/UL    ABS. IMM. GRANS. 0.1 0.0 - 0.5 K/UL   METABOLIC PANEL, COMPREHENSIVE    Collection Time: 04/27/22  9:08 PM   Result Value Ref Range    Sodium 137 136 - 145 mmol/L    Potassium 4.9 3.5 - 5.1 mmol/L    Chloride 105 98 - 107 mmol/L    CO2 25 21 - 32 mmol/L    Anion gap 7 7 - 16 mmol/L    Glucose 117 (H) 65 - 100 mg/dL    BUN 58 (H) 8 - 23 MG/DL    Creatinine 2.40 (H) 0.6 - 1.0 MG/DL    GFR est AA 25 (L) >60 ml/min/1.73m2    GFR est non-AA 20 (L) >60 ml/min/1.73m2    Calcium 8.8 8.3 - 10.4 MG/DL    Bilirubin, total 0.6 0.2 - 1.1 MG/DL    ALT (SGPT) 28 12 - 65 U/L    AST (SGOT) 38 (H) 15 - 37 U/L    Alk.  phosphatase 96 50 - 136 U/L    Protein, total 7.4 6.3 - 8.2 g/dL    Albumin 2.6 (L) 3.2 - 4.6 g/dL    Globulin 4.8 (H) 2.3 - 3.5 g/dL    A-G Ratio 0.5 (L) 1.2 - 3.5     CK    Collection Time: 04/27/22  9:08 PM   Result Value Ref Range     21 - 215 U/L   PROCALCITONIN    Collection Time: 04/27/22  9:08 PM   Result Value Ref Range    Procalcitonin 1.22 (H) 0.00 - 0.49 ng/mL   INFLUENZA A & B AG (RAPID TEST)    Collection Time: 04/27/22  9:10 PM   Result Value Ref Range    Influenza A Ag Negative NEG      Influenza B Ag Negative NEG      Source Nasopharyngeal     EKG, 12 LEAD, INITIAL    Collection Time: 04/27/22  9:47 PM   Result Value Ref Range    Ventricular Rate 87 BPM    Atrial Rate 88 BPM    P-R Interval 176 ms    QRS Duration 139 ms    Q-T Interval 390 ms    QTC Calculation (Bezet) 470 ms    Calculated P Axis 74 degrees    Calculated R Axis -26 degrees    Calculated T Axis 67 degrees    Diagnosis       Sinus rhythm  Ventricular premature complex  Right bundle branch block    Confirmed by Maryln Essex MD (), Tammi Peters ([id number]) on 4/28/2022 6:17:52 AM     CULTURE, BLOOD    Collection Time: 04/27/22 10:07 PM    Specimen: Blood   Result Value Ref Range Special Requests: LEFT  HAND        Culture result: NO GROWTH AFTER 8 HOURS     LACTIC ACID    Collection Time: 04/27/22 10:07 PM   Result Value Ref Range    Lactic acid 3.0 (H) 0.4 - 2.0 MMOL/L   CULTURE, URINE    Collection Time: 04/27/22 10:17 PM    Specimen: Cath Urine    URINE   Result Value Ref Range    Special Requests: NO SPECIAL REQUESTS      Culture result:        NO GROWTH AFTER SHORT PERIOD OF INCUBATION. FURTHER RESULTS TO FOLLOW AFTER OVERNIGHT INCUBATION. URINE MICROSCOPIC    Collection Time: 04/27/22 10:17 PM   Result Value Ref Range    WBC >100 0 /hpf    RBC 10-20 0 /hpf    Epithelial cells 3-5 0 /hpf    Bacteria 4+ (H) 0 /hpf    Casts HYALINE 0 /lpf    Crystals, urine 0 0 /LPF    Mucus 0 0 /lpf    Yeast OCCASIONAL     LACTIC ACID    Collection Time: 04/27/22 11:51 PM   Result Value Ref Range    Lactic acid 4.4 (HH) 0.4 - 2.0 MMOL/L   GLUCOSE, POC    Collection Time: 04/28/22  1:31 AM   Result Value Ref Range    Glucose (POC) 93 65 - 100 mg/dL    Performed by [employer]    TROPONIN-HIGH SENSITIVITY    Collection Time: 04/28/22  3:25 AM   Result Value Ref Range    Troponin-High Sensitivity 25.4 (H) 0 - 14 pg/mL   CBC WITH AUTOMATED DIFF    Collection Time: 04/28/22  3:25 AM   Result Value Ref Range    WBC 12.8 (H) 4.3 - 11.1 K/uL    RBC 4.41 4.05 - 5.2 M/uL    HGB 13.6 11.7 - 15.4 g/dL    HCT 44.2 35.8 - 46.3 %    .2 (H) 79.6 - 97.8 FL    MCH 30.8 26.1 - 32.9 PG    MCHC 30.8 (L) 31.4 - 35.0 g/dL    RDW 13.6 11.9 - 14.6 %    PLATELET 367 (L) 416 - 450 K/uL    MPV 12.6 (H) 9.4 - 12.3 FL    ABSOLUTE NRBC 0.00 0.0 - 0.2 K/uL    DF AUTOMATED      NEUTROPHILS 71 43 - 78 %    LYMPHOCYTES 14 13 - 44 %    MONOCYTES 13 (H) 4.0 - 12.0 %    EOSINOPHILS 1 0.5 - 7.8 %    BASOPHILS 0 0.0 - 2.0 %    IMMATURE GRANULOCYTES 1 0.0 - 5.0 %    ABS. NEUTROPHILS 9.1 (H) 1.7 - 8.2 K/UL    ABS. LYMPHOCYTES 1.9 0.5 - 4.6 K/UL    ABS. MONOCYTES 1.7 (H) 0.1 - 1.3 K/UL    ABS.  EOSINOPHILS 0.1 0.0 - 0.8 K/UL ABS. BASOPHILS 0.0 0.0 - 0.2 K/UL    ABS. IMM. GRANS. 0.1 0.0 - 0.5 K/UL   LACTIC ACID    Collection Time: 04/28/22  3:25 AM   Result Value Ref Range    Lactic acid 1.1 0.4 - 2.0 MMOL/L   METABOLIC PANEL, COMPREHENSIVE    Collection Time: 04/28/22  3:25 AM   Result Value Ref Range    Sodium 140 136 - 145 mmol/L    Potassium 4.2 3.5 - 5.1 mmol/L    Chloride 108 (H) 98 - 107 mmol/L    CO2 24 21 - 32 mmol/L    Anion gap 8 7 - 16 mmol/L    Glucose 117 (H) 65 - 100 mg/dL    BUN 52 (H) 8 - 23 MG/DL    Creatinine 2.00 (H) 0.6 - 1.0 MG/DL    GFR est AA 31 (L) >60 ml/min/1.73m2    GFR est non-AA 25 (L) >60 ml/min/1.73m2    Calcium 8.8 8.3 - 10.4 MG/DL    Bilirubin, total 0.4 0.2 - 1.1 MG/DL    ALT (SGPT) 24 12 - 65 U/L    AST (SGOT) 27 15 - 37 U/L    Alk. phosphatase 84 50 - 136 U/L    Protein, total 6.4 6.3 - 8.2 g/dL    Albumin 2.3 (L) 3.2 - 4.6 g/dL    Globulin 4.1 (H) 2.3 - 3.5 g/dL    A-G Ratio 0.6 (L) 1.2 - 3.5     TROPONIN-HIGH SENSITIVITY    Collection Time: 04/28/22  5:23 AM   Result Value Ref Range    Troponin-High Sensitivity 24.7 (H) 0 - 14 pg/mL   GLUCOSE, POC    Collection Time: 04/28/22  5:40 AM   Result Value Ref Range    Glucose (POC) 101 (H) 65 - 100 mg/dL    Performed by [employer]    GLUCOSE, POC    Collection Time: 04/28/22 11:09 AM   Result Value Ref Range    Glucose (POC) 98 65 - 100 mg/dL    Performed by [facility]        All Micro Results     Procedure Component Value Units Date/Time    CULTURE, URINE [[id number]] Collected: 04/27/22 2217    Order Status: Completed Specimen: Cath Urine Updated: 04/28/22 0917     Special Requests: NO SPECIAL REQUESTS        Culture result:       NO GROWTH AFTER SHORT PERIOD OF INCUBATION. FURTHER RESULTS TO FOLLOW AFTER OVERNIGHT INCUBATION.           BLOOD CULTURE [[id number]] Collected: 04/27/22 2207    Order Status: Completed Specimen: Blood Updated: 04/28/22 0740     Special Requests: --        LEFT  HAND       Culture result: NO GROWTH AFTER 8 HOURS BLOOD CULTURE [[id number]] Collected: 04/28/22 0327    Order Status: Completed Specimen: Blood Updated: 04/28/22 0341    INFLUENZA A & B AG (RAPID TEST) [[id number]] Collected: 04/27/22 2110    Order Status: Completed Specimen: Nasopharyngeal from Nasal washing Updated: 04/27/22 2231     Influenza A Ag Negative        Comment: NEGATIVE FOR THE PRESENCE OF INFLUENZA A ANTIGEN  INFECTION DUE TO INFLUENZA A CANNOT BE RULED OUT. BECAUSE THE ANTIGEN PRESENT IN THE SAMPLE MAY BE BELOW  THE DETECTION LIMIT OF THE TEST. A NEGATIVE TEST IS PRESUMPTIVE AND IT IS RECOMMENDED THAT THESE RESULTS BE CONFIRMED BY VIRAL CULTURE OR AN FDA-CLEARED INFLUENZA A AND B MOLECULAR ASSAY. Influenza B Ag Negative        Comment: NEGATIVE FOR THE PRESENCE OF INFLUENZA B ANTIGEN  INFECTION DUE TO INFLUENZA B CANNOT BE RULED OUT. BECAUSE THE ANTIGEN PRESENT IN THE SAMPLE MAY BE BELOW  THE DETECTION LIMIT OF THE TEST. A NEGATIVE TEST IS PRESUMPTIVE AND IT IS RECOMMENDED THAT THESE RESULTS BE CONFIRMED BY VIRAL CULTURE OR AN FDA-CLEARED INFLUENZA A AND B MOLECULAR ASSAY. Source Nasopharyngeal             Other Studies:  XR CHEST PORT    Result Date: 4/27/2022  EXAM: Chest x-ray. INDICATION: Chest pain. COMPARISON: Prior chest x-ray November 3, 2020. TECHNIQUE: Single frontal view. FINDINGS: The lungs are clear. The heart is upper normal in size. No pneumothorax, vascular congestion or pleural effusion is seen. There is no acute osseous abnormality. No acute process. Signed:  PANTERA Wilson    Part of this note may have been written by using a voice dictation software. The note has been proof read but may still contain some grammatical/other typographical errors.

## 2022-04-28 NOTE — PROGRESS NOTES
ACUTE OT GOALS:  (Developed with and agreed upon by patient and/or caregiver.)  1. Patient will complete lower body bathing and dressing with min A and adaptive equipment as needed. 2. Patient will complete toileting with SBA. 3. Patient will tolerate 30 minutes of OT treatment with 1-2 rest breaks to increase activity tolerance for ADLs. 4. Patient will complete functional transfers with SBA and adaptive equipment as needed. 5. Patient will complete functional mobility for household distances with SBA and adaptive equipment as needed. 6. Patient will complete self-grooming while standing edge of sink with SBA and adaptive equipment as needed. Timeframe: 7 visits         OCCUPATIONAL THERAPY ASSESSMENT: Initial Assessment and Daily Note OT Treatment Day # 1    Yefri Abrams is a 79 y.o. female   PRIMARY DIAGNOSIS: <principal problem not specified>  [facility]epsis ([facility] [A41.9]       Reason for Referral:   ICD-10: Treatment Diagnosis: Generalized Muscle Weakness (M62.81)  INPATIENT: Payor: [facility] MEDICARE / Plan: [address] / Product Type: [employer] Medicare /   ASSESSMENT:     REHAB RECOMMENDATIONS:   Recommendation to date pending progress:  Setting:   Short-term Rehab  Equipment:    None     PRIOR LEVEL OF FUNCTION:  (Prior to Hospitalization)  INITIAL/CURRENT LEVEL OF FUNCTION:  (Based on today's evaluation)   Bathing:   Independent  Dressing:   Independent  Feeding/Grooming:   Independent  Toileting:   Independent  Functional Mobility:   Modified Independent x RW Bathing:   Moderate Assistance  Dressing:   Moderate Assistance  Feeding/Grooming:   Standby Assistance  Toileting:   Maximal Assistance  Functional Mobility:   Minimal Assistance x 2     ASSESSMENT:  Ms. Mingo Vasquez presents with deficits in overall strength, activity tolerance, ADL performance and functional mobility. Presents in ICU for sepsis/UTI. Alert and very pleasant.  BUE AROM and strength (3+/5) are generally decreased but WFL. Mod A for rolling bed mobility to facilitate bowel hygiene. Upon completion, min A x 2 for functional bed mobility/transfers. MIn A x 2 for sit <> stand; fair (+) dynamic standing balance with use of RW. Min A x 2 x RW for steps to chair. Seated rest break provided before completing further functional mobility with min A x 2 x RW. Set-up for self-grooming tasks. Overall, pt tolerating therapy well. OOB to chair. At this time, Socorro Rush is functioning below baseline for ADLs and functional mobility. Pt would benefit from skilled OT services to address OT goals and and plan of care. .     SUBJECTIVE:   Ms. Lizabeth Alex states, \"You girls made my day. \"    SOCIAL HISTORY/LIVING ENVIRONMENT: Lives with son in 1-story home but stays at the home for the majority of the day. Independent at baseline for ADLs and use of RW for functional mobility. No falls.   Home Environment: Independent living  # Steps to Enter: 2  One/Two Story Residence: One story  Living Alone: No  Support Systems: Child(christian)    OBJECTIVE:     PAIN: VITAL SIGNS: LINES/DRAINS:   Pre Treatment: Pain Screen  Pain Scale 1: Numeric (0 - 10)  Pain Intensity 1: 0  Post Treatment: 0   Rodriguez Catheter  O2 Device: Nasal cannula     GROSS EVALUATION:  BUEs Within Functional Limits Abnormal/ Functional Abnormal/ Non-Functional (see comments) Not Tested Comments:   AROM [] [x] [] []    PROM [] [] [] []    Strength [] [x] [] [] Generally weak   Balance [] [x] [] [] Intact sitting balance  Fair standing balance with use of RW   Posture [x] [] [] []    Sensation [x] [] [] []    Coordination [x] [] [] []    Tone [x] [] [] []    Edema [] [x] [] []    Activity Tolerance [] [x] [] []     [] [] [] []      COGNITION/  PERCEPTION: Intact Impaired   (see comments) Comments:   Orientation [x] []    Vision [x] []    Hearing [x] []    Judgment/ Insight [x] []    Attention [x] []    Memory [x] []    Command Following [x] [] Emotional Regulation [x] []     [] []      ACTIVITIES OF DAILY LIVING: I Mod I S SBA CGA Min Mod Max Total NT Comments   BASIC ADLs:              Bathing/ Showering [] [] [] [] [] [] [] [] [] [x]    Toileting [] [] [] [] [] [] [] [x] [] [] Mayelin-care   Dressing [] [] [] [] [] [] [] [] [] [x]    Feeding [] [] [] [] [] [] [] [] [] [x]    Grooming [] [] [] [x] [] [] [] [] [] []    Personal Device Care [] [] [] [] [] [] [] [] [] []    Functional Mobility [] [] [] [] [] [x] [] [] [] [] X 2 x RW   I=Independent, Mod I=Modified Independent, S=Supervision, SBA=Standby Assistance, CGA=Contact Guard Assistance,   Min=Minimal Assistance, Mod=Moderate Assistance, Max=Maximal Assistance, Total=Total Assistance, NT=Not Tested    MOBILITY: I Mod I S SBA CGA Min Mod Max Total  NT x2 Comments:   Supine to sit [] [] [] [] [] [x] [] [] [] [] [x]    Sit to supine [] [] [] [] [] [] [] [] [] [x] []    Sit to stand [] [] [] [] [] [x] [] [] [] [] [x]    Bed to chair [] [] [] [] [] [x] [] [] [] [] [x]    I=Independent, Mod I=Modified Independent, S=Supervision, SBA=Standby Assistance, CGA=Contact Guard Assistance,   Min=Minimal Assistance, Mod=Moderate Assistance, Max=Maximal Assistance, Total=Total Assistance, NT=Not Tested    [facility] [address] [address] AM-PAC 6 Clicks   Daily Activity Inpatient Short Form        How much help from another person does the patient currently need. .. Total A Lot A Little None   1. Putting on and taking off regular lower body clothing? [] 1   [x] 2   [] 3   [] 4   2. Bathing (including washing, rinsing, drying)? [] 1   [x] 2   [] 3   [] 4   3. Toileting, which includes using toilet, bedpan or urinal?   [] 1   [x] 2   [] 3   [] 4   4. Putting on and taking off regular upper body clothing? [] 1   [] 2   [x] 3   [] 4   5. Taking care of personal grooming such as brushing teeth? [] 1   [] 2   [x] 3   [] 4   6. Eating meals?    [] 1   [] 2   [x] 3   [] 4   © 2007, Trustees of [address] [place] [address] [address] [address], under license to [employer]. All rights reserved     Score:  Initial: 15 Most Recent: X (Date: -- )   Interpretation of Tool:  Represents activities that are increasingly more difficult (i.e. Bed mobility, Transfers, Gait). PLAN:   FREQUENCY/DURATION: OT Plan of Care: 3 times/week for duration of hospital stay or until stated goals are met, whichever comes first.    PROBLEM LIST:   (Skilled intervention is medically necessary to address:)  1. Decreased ADL/Functional Activities  2. Decreased Activity Tolerance  3. Decreased AROM/PROM  4. Decreased Balance  5. Decreased Gait Ability  6. Decreased Strength  7. Decreased Transfer Abilities   INTERVENTIONS PLANNED:   (Benefits and precautions of occupational therapy have been discussed with the patient.)  1. Self Care Training  2. Therapeutic Activity  3. Therapeutic Exercise/HEP  4. Neuromuscular Re-education  5. Education     TREATMENT:     EVALUATION: Low Complexity : (Untimed Charge)    TREATMENT:   ($$ Self Care/Home Management: 8-22 mins$$ Neuromuscular Re-Education: 8-22 mins   )  Self Care (13 Minutes): Self care including Toileting, Grooming and Energy Conservation Training to increase independence and decrease level of assistance required. Neuromuscular Re-education (10 Minutes): Neuromuscular Re-education included Balance Training, Coordination training, Postural training, Sitting balance training and Standing balance training to improve Balance, Coordination and Postural Control.     TREATMENT GRID:  N/A    AFTER TREATMENT POSITION/PRECAUTIONS:  Chair, Needs within reach and RN notified    INTERDISCIPLINARY COLLABORATION:  RN/PCT, PT/PTA and OT/TORREZ    TOTAL TREATMENT DURATION:  OT Patient Time In/Time Out  Time In: 3035  Time Out: [address], OT

## 2022-04-28 NOTE — PROGRESS NOTES
Pt transferred to room 520 per recliner with personal belongings and O2 at 5 liters NC. New primary RN met pt in room. Call light in reach.

## 2022-04-28 NOTE — PROGRESS NOTES
ACUTE PHYSICAL THERAPY GOALS:  (Developed with and agreed upon by patient and/or caregiver. 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 (2.)Ms. Ministerio Grigsby will transfer from bed to chair and chair to bed with SUPERVISION using the least restrictive device within 7 treatment day(s). (3.)Ms. Ministerio Grigsby will ambulate with SUPERVISION for 250 feet with the least restrictive device within 7 treatment day(s). (4.)Ms. Ministerio Grigsby will participate in therapeutic activity/exercises x 25 minutes for increased activity tolerance within 7 treatment days. ________________________________________________________________________________________________          PHYSICAL THERAPY ASSESSMENT: Initial Assessment and AM PT Treatment Day # 1      Gavino Hastings is a 79 y.o. female   PRIMARY DIAGNOSIS: <principal problem not specified>  [facility] [A41.9]       Reason for Referral:    ICD-10: Treatment Diagnosis: Generalized Muscle Weakness (M62.81)  Difficulty in walking, Not elsewhere classified (R26.2)  INPATIENT: Payor: [facility] MEDICARE / Plan: [address] / Product Type: Managed Care Medicare /     ASSESSMENT:     REHAB RECOMMENDATIONS:   Recommendation to date pending progress:  Setting:   Short-term Rehab  Equipment:    To Be Determined     PRIOR LEVEL OF FUNCTION:  (Prior to Hospitalization) INITIAL/CURRENT LEVEL OF FUNCTION:  (Most Recently Demonstrated)   Bed Mobility:   Independent  Sit to Stand:   Independent  Transfers:   Independent  Gait/Mobility:   Independent Bed Mobility:   Minimal Assistance x 2  Sit to Stand:   Minimal Assistance x 2  Transfers:   min-modA x 2  Gait/Mobility:   min-modA x 2     ASSESSMENT:  Ms. Ministerio Grigsby presents to PT with WFL AROM and decreased strength in B LEs.   Pt performed bed mobility with min-modA x 2 and fair sitting balance. She was able to stand today with Ernst x 2 and fair standing balance using RW. Pt transferred to recliner with min-modA x 2 and decreased B LE step clearance. Ms. Moriah Gutierrez could benefit from skilled PT as she is currently functioning below her baseline.         SUBJECTIVE:   Ms. Moriah Gutierrez states, \"Y'all are too much\"    SOCIAL HISTORY/LIVING ENVIRONMENT: Lives with son and uses RW for ambulation  Home Environment: Independent living  # Steps to Enter: 2  One/Two Story Residence: One story  Living Alone: No  Support Systems: Child(christian)  OBJECTIVE:     PAIN: VITAL SIGNS: LINES/DRAINS:   Pre Treatment: Pain Screen  Pain Scale 1: Numeric (0 - 10)  Pain Intensity 1: 0  Post Treatment: 0   Continuous Pulse Oximetry, Rodriguez Catheter and IV  O2 Device: Nasal cannula     GROSS EVALUATION:  B LE Within Functional Limits Abnormal/ Functional Abnormal/ Non-Functional (see comments) Not Tested Comments:   AROM [x] [] [] []    PROM [] [] [] []    Strength [] [x] [] []    Balance [] [x] [] [] Fair- standing   Posture [] [] [] []    Sensation [] [] [] []    Coordination [] [x] [] []    Tone [] [] [] []    Edema [] [] [] []    Activity Tolerance [] [x] [] []     [] [] [] []      COGNITION/  PERCEPTION: Intact Impaired   (see comments) Comments:   Orientation [x] []    Vision [] []    Hearing [x] []    Command Following [x] []    Safety Awareness [x] []     [] []      MOBILITY: I Mod I S SBA CGA Min Mod Max Total  NT x2 Comments:   Bed Mobility    Rolling [] [] [] [] [] [x] [] [] [] [] [x]    Supine to Sit [] [] [] [] [] [x] [] [] [] [] [x]    Scooting [] [] [] [] [] [] [] [] [] [] []    Sit to Supine [] [] [] [] [] [] [] [] [] [] []    Transfers    Sit to Stand [] [] [] [] [] [x] [] [] [] [] [x]    Bed to Chair [] [] [] [] [] [x] [x] [] [] [] [x]    Stand to Sit [] [] [] [] [] [x] [] [] [] [] [x]    I=Independent, Mod I=Modified Independent, S=Supervision, SBA=Standby Assistance, CGA=Contact Guard Assistance, Min=Minimal Assistance, Mod=Moderate Assistance, Max=Maximal Assistance, Total=Total Assistance, NT=Not Tested  GAIT: I Mod I S SBA CGA Min Mod Max Total  NT x2 Comments:   Level of Assistance [] [] [] [] [] [x] [x] [] [] [] [x]    Distance 4 ft    DME Rolling Walker    Gait Quality Decreased B LE step length    Weightbearing Status N/A     I=Independent, Mod I=Modified Independent, S=Supervision, SBA=Standby Assistance, CGA=Contact Guard Assistance,   Min=Minimal Assistance, Mod=Moderate Assistance, Max=Maximal Assistance, Total=Total Assistance, NT=Not Tested    [place] Form       How much difficulty does the patient currently have. .. Unable A Lot A Little None   1. Turning over in bed (including adjusting bedclothes, sheets and blankets)? [] 1   [] 2   [x] 3   [] 4   2. Sitting down on and standing up from a chair with arms ( e.g., wheelchair, bedside commode, etc.)   [] 1   [] 2   [x] 3   [] 4   3. Moving from lying on back to sitting on the side of the bed? [] 1   [] 2   [x] 3   [] 4   How much help from another person does the patient currently need. .. Total A Lot A Little None   4. Moving to and from a bed to a chair (including a wheelchair)? [] 1   [] 2   [x] 3   [] 4   5. Need to walk in hospital room? [] 1   [] 2   [x] 3   [] 4   6. Climbing 3-5 steps with a railing? [] 1   [x] 2   [] 3   [] 4   © 2007, Trustees of [address] [place] [address] [address] [address], under license to [email or web address]. All rights reserved     Score:  Initial: 17 Most Recent: X (Date: -- )    Interpretation of Tool:  Represents activities that are increasingly more difficult (i.e. Bed mobility, Transfers, Gait). PLAN:   FREQUENCY/DURATION: PT Plan of Care: 3 times/week for duration of hospital stay or until stated goals are met, whichever comes first.    PROBLEM LIST:   (Skilled intervention is medically necessary to address:)  1. Decreased ADL/Functional Activities  2.  Decreased Activity Tolerance  3. Decreased Balance  4. Decreased Coordination  5. Decreased Gait Ability  6. Decreased Strength  7. Decreased Transfer Abilities   INTERVENTIONS PLANNED:   (Benefits and precautions of physical therapy have been discussed with the patient.)  1. Therapeutic Activity  2. Therapeutic Exercise/HEP  3. Neuromuscular Re-education  4. Gait Training  5. Manual Therapy  6. Education     TREATMENT:     EVALUATION: Low Complexity : (Untimed Charge)    TREATMENT:   ($$ Therapeutic Activity: 23-37 mins    )  Co-Treatment PT/OT necessary due to patient's decreased overall endurance/tolerance levels, as well as need for high level skilled assistance to complete functional transfers/mobility and functional tasks  Therapeutic Activity (23 Minutes): Therapeutic activity included Rolling, Supine to Sit, Transfer Training, Ambulation on level ground, Sitting balance  and Standing balance to improve functional Mobility, Strength and Activity tolerance.     TREATMENT GRID:  N/A    AFTER TREATMENT POSITION/PRECAUTIONS:  Chair, Needs within reach and RN notified    INTERDISCIPLINARY COLLABORATION:  RN/PCT, PT/PTA and OT/TORREZ    TOTAL TREATMENT DURATION:  PT Patient Time In/Time Out  Time In: 0915  Time Out: [address] Jaycee PT, DPT

## 2022-04-28 NOTE — PROGRESS NOTES
Pt up in recliner, call bell in reach. Pt denies needing anything at present. Pt reminded to use call bell for any needs.

## 2022-04-28 NOTE — PROGRESS NOTES
Occupational Therapy Note:    Participated in interdisciplinary rounds in ICU/CCU and chart reviewed. Patient is experiencing decrease in function from baseline. Patient would benefit from skilled acute therapy to increase independence with self care/ADLs, strength, endurance, and functional mobility. Recommend OT/PT consult when medically stable and MD agrees.     Thank you for your consideration,  Mitch Hilario OTR/L

## 2022-04-28 NOTE — PROGRESS NOTES
Patient transferred to room 3111 on 5 L NC. Lung sounds clear and diminished bilaterally. CHG bath completed and patient found to have 3 small open wounds on buttocks as well as redness/soreness on perineum area. Zinc cream and allevyn applied. Wound care team consulted. Dual skin assessment completed with Maryann Diez RN.

## 2022-04-28 NOTE — PROGRESS NOTES
TRANSFER - IN REPORT:    Verbal report received from [facility]) on Zack Jackson  being received from ICU(unit) for routine progression of care      Report consisted of patients Situation, Background, Assessment and   Recommendations(SBAR). Information from the following report(s) SBAR, ED Summary and Recent Results was reviewed with the receiving nurse. Opportunity for questions and clarification was provided. Assessment completed upon patients arrival to unit and care assumed.

## 2022-04-28 NOTE — PROGRESS NOTES
Pt seen in ICU s/p admission sepsis. Alert and oriented currently. Confirms demographics. Lives with son, Sherron Leahy. Four of her children are near by, one in [place]. Daughter, Roge Barrientos, comes every day to assist pt with any needs she states. No current HH or rehab per pt. Aware CM to follow for d/c needs/POC. PPD for any potential rehab needs. PT/OT per MD.     Care Management Interventions  PCP Verified by CM: Yes Noelle Solomon)  Mode of Transport at Discharge:  Other (see comment)  Transition of Care Consult (CM Consult): Discharge Planning  Discharge Durable Medical Equipment:  (O2, unsure of company, BSC, W/C, walker)  Support Systems: Child(christian)  Confirm Follow Up Transport: Family  Freedom of Choice List was Provided with Basic Dialogue that Supports the Patient's Individualized Plan of Care/Goals, Treatment Preferences and Shares the Quality Data Associated with the Providers?: Yes  [place] Resource Information Provided?:  [facility])  Discharge Location  Patient Expects to be Discharged to[de-identified] Unable to determine at this time

## 2022-04-28 NOTE — ED TRIAGE NOTES
Pt states she is having general body aches and having sinus congestion and runny nose   Started feeling bad yesterday     Denies having sob, pt is on 5l\min via nc

## 2022-04-28 NOTE — PROGRESS NOTES
completed initial visit with patient. Patient expressed a positive affect and positive life review.  provided pastoral presence, prayer and empathetic listening.   Signed by  Tadeo Lawler M.Div.

## 2022-04-28 NOTE — PROGRESS NOTES
Progress Note Update:    Seen after midnight. Now off pressors since 3AM. US to evaluate for obstruction for pyelonephritis. Wean O2 as tolerated to home dose, change insulin to ACHS. Can transfer out of ICU and will consult hospitalist to assume primary.     Jessica Urbina, MINERVA

## 2022-04-28 NOTE — PROGRESS NOTES
TRANSFER - OUT REPORT:    Verbal report given to Kera ALVARENGA(name) on Lolis Echevarria  being transferred to [facility](unit) for routine progression of care       Report consisted of patients Situation, Background, Assessment and   Recommendations(SBAR). Information from the following report(s) ED Summary, MAR, Recent Results and Cardiac Rhythm SR with BBB was reviewed with the receiving nurse. Lines:   Peripheral IV 04/27/22 Right Hand (Active)   Site Assessment Clean, dry, & intact 04/28/22 0715   Phlebitis Assessment 0 04/28/22 0715   Infiltration Assessment 0 04/28/22 0715   Dressing Status Clean, dry, & intact 04/28/22 0715   Dressing Type Transparent 04/28/22 0715   Hub Color/Line Status Green;Capped;Flushed 04/28/22 0715   Action Taken Open ports on tubing capped 04/28/22 0153   Alcohol Cap Used Yes 04/28/22 0715       Peripheral IV 04/27/22 Left Hand (Active)   Site Assessment Clean, dry, & intact 04/28/22 0715   Phlebitis Assessment 0 04/28/22 0715   Infiltration Assessment 0 04/28/22 0715   Dressing Status Clean, dry, & intact 04/28/22 0715   Dressing Type Transparent 04/28/22 0715   Hub Color/Line Status Pink;Capped;Flushed 04/28/22 0715   Action Taken Open ports on tubing capped 04/28/22 0153   Alcohol Cap Used Yes 04/28/22 0715        Opportunity for questions and clarification was provided.       Patient transported with:   O2 @ 5 liters   Message left with daughter Linus Burton about new room assignment

## 2022-04-28 NOTE — WOUND CARE
Patient seen for left buttock wound. Noted 2 open areas less than 1cm and 2 healed pink scars. Main ulcer has yellow base and has a boil like appearance. Patient stated \"yes, I get boils all the time\". Recommend bathing with hibiclens and rinse well. Cover with silicone foam 3x3 every day for now. Will plan follow up early next week for treatment adjustment as needed.

## 2022-04-28 NOTE — PROGRESS NOTES
Bedside report received from [facility]. Pt resting quietly in bed with O2 on 5 liters NC. Pt easily arouses to name. No pain. Levophed gtt remains off.

## 2022-04-28 NOTE — INTERDISCIPLINARY ROUNDS
Multi-D Rounds/Checklist:  A: Is pain control adequate? yes  B: Sedation break and SBT? N/A  C: Is sedation choice appropriate? N/A  D: Delirium/CAM-ICU? negative  E: Mobility goals/appropriateness? PT/Ot  F: Family update and plan? yes  Vent: HOB elevated?  N/A             CC/Kg?:              Vent Day?:   Lines: can any be removed?: N/A- PIV only  DVT Prophylaxis: subQ heparin  Nutrition Ordered/appropriate: regular  Can antibiotics or other drugs be stopped?: no, currently on rocephin  Consults needed: Wound    Adan Chauhan NP

## 2022-04-28 NOTE — PROGRESS NOTES
04/28/22 1145   Dual Skin Pressure Injury Assessment   Dual Skin Pressure Injury Assessment WDL   Second Care Provider (Based on [address] [place] [address]) yolanda barone    Skin Integumentary   Skin Integumentary (WDL) X   Skin Color Appropriate for ethnicity   Skin Condition/Temp Dry; Warm   Skin Integrity Intact; Wound (add Wound LDA)  (R buttucks)   Turgor Epidermis thin w/ loss of subcut tissue   Hair Growth Present   2 open areas on left buttucks, seen by wound care

## 2022-04-29 LAB
ACC. NO. FROM MICRO ORDER, ACCP: ABNORMAL
ALBUMIN SERPL-MCNC: 2.4 G/DL (ref 3.2–4.6)
ALBUMIN/GLOB SERPL: 0.6 {RATIO} (ref 1.2–3.5)
ALP SERPL-CCNC: 76 U/L (ref 50–136)
ALT SERPL-CCNC: 24 U/L (ref 12–65)
ANION GAP SERPL CALC-SCNC: 5 MMOL/L (ref 7–16)
AST SERPL-CCNC: 29 U/L (ref 15–37)
BASOPHILS # BLD: 0 K/UL (ref 0–0.2)
BASOPHILS NFR BLD: 0 % (ref 0–2)
BILIRUB SERPL-MCNC: 0.2 MG/DL (ref 0.2–1.1)
BUN SERPL-MCNC: 39 MG/DL (ref 8–23)
CALCIUM SERPL-MCNC: 8.6 MG/DL (ref 8.3–10.4)
CHLORIDE SERPL-SCNC: 111 MMOL/L (ref 98–107)
CO2 SERPL-SCNC: 29 MMOL/L (ref 21–32)
COVID-19 RAPID TEST, COVR: NOT DETECTED
CREAT SERPL-MCNC: 1.4 MG/DL (ref 0.6–1)
DIFFERENTIAL METHOD BLD: ABNORMAL
EOSINOPHIL # BLD: 0.4 K/UL (ref 0–0.8)
EOSINOPHIL NFR BLD: 6 % (ref 0.5–7.8)
ERYTHROCYTE [DISTWIDTH] IN BLOOD BY AUTOMATED COUNT: 13.7 % (ref 11.9–14.6)
GLOBULIN SER CALC-MCNC: 3.8 G/DL (ref 2.3–3.5)
GLUCOSE BLD STRIP.AUTO-MCNC: 110 MG/DL (ref 65–100)
GLUCOSE BLD STRIP.AUTO-MCNC: 132 MG/DL (ref 65–100)
GLUCOSE BLD STRIP.AUTO-MCNC: 93 MG/DL (ref 65–100)
GLUCOSE BLD STRIP.AUTO-MCNC: 98 MG/DL (ref 65–100)
GLUCOSE SERPL-MCNC: 88 MG/DL (ref 65–100)
HCT VFR BLD AUTO: 39.6 % (ref 35.8–46.3)
HGB BLD-MCNC: 12.1 G/DL (ref 11.7–15.4)
IMM GRANULOCYTES # BLD AUTO: 0 K/UL (ref 0–0.5)
IMM GRANULOCYTES NFR BLD AUTO: 1 % (ref 0–5)
INTERPRETATION: ABNORMAL
LACTATE SERPL-SCNC: 1.3 MMOL/L (ref 0.4–2)
LYMPHOCYTES # BLD: 1.8 K/UL (ref 0.5–4.6)
LYMPHOCYTES NFR BLD: 23 % (ref 13–44)
MCH RBC QN AUTO: 30.7 PG (ref 26.1–32.9)
MCHC RBC AUTO-ENTMCNC: 30.6 G/DL (ref 31.4–35)
MCV RBC AUTO: 100.5 FL (ref 79.6–97.8)
MECA (METHICILLIN-RESISTANCE GENES), MRGP: DETECTED
MM INDURATION POC: 0 MM (ref 0–5)
MONOCYTES # BLD: 1.3 K/UL (ref 0.1–1.3)
MONOCYTES NFR BLD: 17 % (ref 4–12)
NEUTS SEG # BLD: 4.2 K/UL (ref 1.7–8.2)
NEUTS SEG NFR BLD: 54 % (ref 43–78)
NRBC # BLD: 0 K/UL (ref 0–0.2)
PLATELET # BLD AUTO: 149 K/UL (ref 150–450)
PMV BLD AUTO: 12.9 FL (ref 9.4–12.3)
POTASSIUM SERPL-SCNC: 4.1 MMOL/L (ref 3.5–5.1)
PPD POC: NEGATIVE NEGATIVE
PROT SERPL-MCNC: 6.2 G/DL (ref 6.3–8.2)
RBC # BLD AUTO: 3.94 M/UL (ref 4.05–5.2)
SERVICE CMNT-IMP: ABNORMAL
SERVICE CMNT-IMP: ABNORMAL
SERVICE CMNT-IMP: NORMAL
SERVICE CMNT-IMP: NORMAL
SODIUM SERPL-SCNC: 145 MMOL/L (ref 136–145)
SOURCE, COVRS: NORMAL
STAPHYLOCOCCUS, STAPP: DETECTED
WBC # BLD AUTO: 7.9 K/UL (ref 4.3–11.1)

## 2022-04-29 PROCEDURE — 96376 TX/PRO/DX INJ SAME DRUG ADON: CPT

## 2022-04-29 PROCEDURE — 94640 AIRWAY INHALATION TREATMENT: CPT

## 2022-04-29 PROCEDURE — 2709999900 HC NON-CHARGEABLE SUPPLY

## 2022-04-29 PROCEDURE — 74011250636 HC RX REV CODE- 250/636: Performed by: INTERNAL MEDICINE

## 2022-04-29 PROCEDURE — 36415 COLL VENOUS BLD VENIPUNCTURE: CPT

## 2022-04-29 PROCEDURE — G0378 HOSPITAL OBSERVATION PER HR: HCPCS

## 2022-04-29 PROCEDURE — 97535 SELF CARE MNGMENT TRAINING: CPT

## 2022-04-29 PROCEDURE — 74011250636 HC RX REV CODE- 250/636: Performed by: PHYSICIAN ASSISTANT

## 2022-04-29 PROCEDURE — 87635 SARS-COV-2 COVID-19 AMP PRB: CPT

## 2022-04-29 PROCEDURE — 87040 BLOOD CULTURE FOR BACTERIA: CPT

## 2022-04-29 PROCEDURE — 80053 COMPREHEN METABOLIC PANEL: CPT

## 2022-04-29 PROCEDURE — 97530 THERAPEUTIC ACTIVITIES: CPT

## 2022-04-29 PROCEDURE — 74011000258 HC RX REV CODE- 258: Performed by: INTERNAL MEDICINE

## 2022-04-29 PROCEDURE — 94760 N-INVAS EAR/PLS OXIMETRY 1: CPT

## 2022-04-29 PROCEDURE — 74011250637 HC RX REV CODE- 250/637: Performed by: INTERNAL MEDICINE

## 2022-04-29 PROCEDURE — 82962 GLUCOSE BLOOD TEST: CPT

## 2022-04-29 PROCEDURE — 77030040393 HC DRSG OPTIFOAM GENT MDII -B

## 2022-04-29 PROCEDURE — 96372 THER/PROPH/DIAG INJ SC/IM: CPT

## 2022-04-29 PROCEDURE — 99232 SBSQ HOSP IP/OBS MODERATE 35: CPT | Performed by: INTERNAL MEDICINE

## 2022-04-29 PROCEDURE — 83605 ASSAY OF LACTIC ACID: CPT

## 2022-04-29 PROCEDURE — 65270000029 HC RM PRIVATE

## 2022-04-29 PROCEDURE — 77010033678 HC OXYGEN DAILY

## 2022-04-29 PROCEDURE — 74011000250 HC RX REV CODE- 250: Performed by: INTERNAL MEDICINE

## 2022-04-29 PROCEDURE — 85025 COMPLETE CBC W/AUTO DIFF WBC: CPT

## 2022-04-29 RX ADMIN — ATORVASTATIN CALCIUM 10 MG: 10 TABLET, FILM COATED ORAL at 07:44

## 2022-04-29 RX ADMIN — GABAPENTIN 100 MG: 100 CAPSULE ORAL at 16:47

## 2022-04-29 RX ADMIN — GABAPENTIN 100 MG: 100 CAPSULE ORAL at 22:16

## 2022-04-29 RX ADMIN — SODIUM CHLORIDE, PRESERVATIVE FREE 10 ML: 5 INJECTION INTRAVENOUS at 14:00

## 2022-04-29 RX ADMIN — HEPARIN SODIUM 5000 UNITS: 5000 INJECTION INTRAVENOUS; SUBCUTANEOUS at 22:16

## 2022-04-29 RX ADMIN — SODIUM CHLORIDE, PRESERVATIVE FREE 5 ML: 5 INJECTION INTRAVENOUS at 22:17

## 2022-04-29 RX ADMIN — HEPARIN SODIUM 5000 UNITS: 5000 INJECTION INTRAVENOUS; SUBCUTANEOUS at 14:21

## 2022-04-29 RX ADMIN — CEFTRIAXONE 2 G: 2 INJECTION, POWDER, FOR SOLUTION INTRAMUSCULAR; INTRAVENOUS at 01:58

## 2022-04-29 RX ADMIN — SODIUM CHLORIDE, PRESERVATIVE FREE 10 ML: 5 INJECTION INTRAVENOUS at 06:02

## 2022-04-29 RX ADMIN — MOMETASONE FUROATE AND FORMOTEROL FUMARATE DIHYDRATE 2 PUFF: 200; 5 AEROSOL RESPIRATORY (INHALATION) at 19:55

## 2022-04-29 RX ADMIN — SODIUM CHLORIDE 75 ML/HR: 9 INJECTION, SOLUTION INTRAVENOUS at 21:00

## 2022-04-29 RX ADMIN — HEPARIN SODIUM 5000 UNITS: 5000 INJECTION INTRAVENOUS; SUBCUTANEOUS at 06:01

## 2022-04-29 RX ADMIN — GABAPENTIN 100 MG: 100 CAPSULE ORAL at 07:44

## 2022-04-29 RX ADMIN — MOMETASONE FUROATE AND FORMOTEROL FUMARATE DIHYDRATE 2 PUFF: 200; 5 AEROSOL RESPIRATORY (INHALATION) at 08:38

## 2022-04-29 NOTE — PROGRESS NOTES
Chart screened by CM for d/c planning. Pt transferred on 4-28-22 from CCU room 3111 to room 520 for progression of care. Please see previous CM note dated 4-28-22 for initial assessment information. Pt presented to the ED with c/o generalized weakness and dysuria. Pt admitted with Dx of Septic shock, UTI, ATN/GILMAR, DM-II, Chronic COPD and home O2 dependent (5L NC @ baseline), Chronic hypoxic respiratory failure, HTN, Morbid obesity, Hx fo CVA, Hx of nephrolithiasis, Lactic acidosis, and Debility. Pt is currently requiring 3L O2 NC. She is receiving IVF and IV ABT. PT and OT have evaluated pt and recommend STR at d/c. Wound care is following. CM met with pt at the bedside to discuss d/c planning. CM discussed PT and OT's recommendation for STR at d/c. Pt states she has participated in both STR and [facility] in the past.  She stated she needs to speak with her eldest daughter before making a decision and requested CM to come speak with her again today after 1500. CM received a call from pt's eldest daughter Azalea Stewart regarding d/c planing. Christina Andrade works at [employer] and states that pt has been there previously for STR. She would like for pt to return there for STR. CM placed the referral and it has been accepted. PPD has been ordered. A rapid COVID-19 test is also needed. Care Management Interventions  PCP Verified by CM: Yes (Maricel Penny.  Aria Degroot MD)  Mode of Transport at Discharge: S ([facility])  Transition of Care Consult (CM Consult): Discharge Planning,SNF  Partner SNF: No  Reason Why Partner SNF Not Chosen: Friend/family recommendation  Discharge Durable Medical Equipment: No  Physical Therapy Consult: Yes  Occupational Therapy Consult: Yes  Speech Therapy Consult: No  Support Systems: Child(christian)  Confirm Follow Up Transport: Other (see comment) [facility])  The Plan for Transition of Care is Related to the Following Treatment Goals : Pt requires STR to return to functional baseline in the community.   The Patient and/or Patient Representative was Provided with a Choice of Provider and Agrees with the Discharge Plan?: Yes  Name of the Patient Representative Who was Provided with a Choice of Provider and Agrees with the Discharge Plan: [facility]  [place] of Choice List was Provided with Basic Dialogue that Supports the Patient's Individualized Plan of Care/Goals, Treatment Preferences and Shares the Quality Data Associated with the Providers?: Yes  [place] Resource Information Provided?: No  Discharge Location  Patient Expects to be Discharged to[de-identified] Skilled nursing facility ([facility] for STR)

## 2022-04-29 NOTE — PROGRESS NOTES
ACUTE PHYSICAL THERAPY GOALS:  (Developed with and agreed upon by patient and/or caregiver. )  LTG:  (1.)Ms. Gerard Cheek will move from supine to sit and sit to supine , scoot up and down and roll side to side in bed with MODIFIED INDEPENDENCE within 7 treatment day(s). (2.)Ms. Gerard Cheek will transfer from bed to chair and chair to bed with SUPERVISION using the least restrictive device within 7 treatment day(s). (3.)Ms. Gerard Cheek will ambulate with SUPERVISION for 250 feet with the least restrictive device within 7 treatment day(s). (4.)Ms. Gerard Cheek will participate in therapeutic activity/exercises x 25 minutes for increased activity tolerance within 7 treatment days.       PHYSICAL THERAPY: Daily Note and PM Treatment Day # 2    Geovanni Silverio is a 79 y.o. female   PRIMARY DIAGNOSIS: Septic shock ([facility] [address]  Sepsis ([facility] [A41.9]         ASSESSMENT:     REHAB RECOMMENDATIONS: CURRENT LEVEL OF FUNCTION:  (Most Recently Demonstrated)   Recommendation to date pending progress:  Setting:   Short-term Rehab  Equipment:    To Be Determined Bed Mobility:   Minimal Assistance  Sit to Stand:   Minimal Assistance -mod A x2  Transfers:   Minimal Assistance -mod A x2  Gait/Mobility:   Not tested     ASSESSMENT:  Ms. Gerard Cheek presents supine in bed and agreeable to therapy. She demonstrates no significant progress towards her goals today but was able to preform several sit to stands-more than the last visit. Pt requires min-mod A x2 for sit to stand to perform self care and brief change with seated rest breaks between attempts due to fatigue. She also transferred to the bedside chair with min-mod A x2, decreased step length and forward flexion with instability demonstrated. PT will continue to follow.      SUBJECTIVE:   Ms. Gerard Cheek states, \"I think I am too tired to do anything else\"    SOCIAL HISTORY/ LIVING ENVIRONMENT: refer to [facility]   Home Environment: Independent living  # Steps to Enter: 2  One/Two Story Residence: One story  Living Alone: No  Support Systems: Child(christian)  OBJECTIVE:     PAIN: VITAL SIGNS: LINES/DRAINS:   Pre Treatment: Pain Screen  Pain Scale 1: Numeric (0 - 10)  Pain Intensity 1: 0  Post Treatment: 0   IV and Purewick  O2 Device: Hi flow nasal cannula     MOBILITY: I Mod I S SBA CGA Min Mod Max Total  NT x2 Comments:   Bed Mobility    Rolling [] [] [] [] [] [] [] [] [] [x] []    Supine to Sit [] [] [] [] [] [x] [] [] [] [] []    Scooting [] [] [] [] [] [x] [] [] [] [] []    Sit to Supine [] [] [] [] [] [] [] [] [] [x] []    Transfers    Sit to Stand [] [] [] [] [] [x] [x] [] [] [] [x]    Bed to Chair [] [] [] [] [] [x] [x] [] [] [] [x]    Stand to Sit [] [] [] [] [] [x] [] [] [] [] [x]    I=Independent, Mod I=Modified Independent, S=Supervision, SBA=Standby Assistance, CGA=Contact Guard Assistance,   Min=Minimal Assistance, Mod=Moderate Assistance, Max=Maximal Assistance, Total=Total Assistance, NT=Not Tested    BALANCE: Good Fair+ Fair Fair- Poor NT Comments   Sitting Static [x] [] [] [] [] []    Sitting Dynamic [x] [] [] [] [] []              Standing Static [] [x] [] [] [] []    Standing Dynamic [] [] [x] [] [] []      GAIT: I Mod I S SBA CGA Min Mod Max Total  NT x2 Comments:   Level of Assistance [] [] [] [] [] [] [] [] [] [x] []    Distance     DME N/A    Gait Quality     Weightbearing  Status N/A     I=Independent, Mod I=Modified Independent, S=Supervision, SBA=Standby Assistance, CGA=Contact Guard Assistance,   Min=Minimal Assistance, Mod=Moderate Assistance, Max=Maximal Assistance, Total=Total Assistance, NT=Not Tested    PLAN:   FREQUENCY/DURATION: PT Plan of Care: 3 times/week for duration of hospital stay or until stated goals are met, whichever comes first.  TREATMENT:     TREATMENT:   ($$ Therapeutic Activity: 23-37 mins    )  Co-Treatment PT/OT necessary due to patient's decreased overall endurance/tolerance levels, as well as need for high level skilled assistance to complete functional transfers/mobility and functional tasks  Therapeutic Activity (23 Minutes): Therapeutic activity included Supine to Sit, Scooting, Transfer Training, Sitting balance  and Standing balance to improve functional Mobility, Strength and Activity tolerance.     TREATMENT GRID:  N/A    AFTER TREATMENT POSITION/PRECAUTIONS:  Chair and Needs within reach    INTERDISCIPLINARY COLLABORATION:  RN/PCT, PT/PTA and OT/TORREZ    TOTAL TREATMENT DURATION:  PT Patient Time In/Time Out  Time In: 1340  Time Out: [address],

## 2022-04-29 NOTE — PROGRESS NOTES
ACUTE OT GOALS:  (Developed with and agreed upon by patient and/or caregiver.)  1. Patient will complete lower body bathing and dressing with min A and adaptive equipment as needed. 2. Patient will complete toileting with SBA. 3. Patient will tolerate 30 minutes of OT treatment with 1-2 rest breaks to increase activity tolerance for ADLs. 4. Patient will complete functional transfers with SBA and adaptive equipment as needed. 5. Patient will complete functional mobility for household distances with SBA and adaptive equipment as needed. 6. Patient will complete self-grooming while standing edge of sink with SBA and adaptive equipment as needed. Timeframe: 7 visits       OCCUPATIONAL THERAPY: Daily Note OT Treatment Day # 2    Eric Diop is a 79 y.o. female   PRIMARY DIAGNOSIS: Septic shock ([facility] [address]  Sepsis ([facility] [address] [A41.9]       Payor: Rodger Echevarria / Plan: [employer] / Product Type: [employer] /   ASSESSMENT:     REHAB RECOMMENDATIONS: CURRENT LEVEL OF FUNCTION:  (Most Recently Demonstrated)   Recommendation to date pending progress:  Setting:   Short-term Rehab  Equipment:    To Be Determined Bathing:   Not tested  Dressing:   Moderate Assistance-socks   Feeding/Grooming:   Not tested  Toileting:   Total Assistance  Functional Mobility:   Minimal Assistance x 2-mod A X 2     ASSESSMENT:  Ms. Alondra Ocampo was supine in bed upon arrival. Pt completed bed mobility with min A X 2. Pt sat EOB and was able to don/doff socks with mod A. Pt required total A for bowel hygiene and brief change. Pt completed functional transfer with min-mod A X 2. Continue POC.       SUBJECTIVE:   Ms. Alondra Ocampo states, \"Hey\"    SOCIAL HISTORY/LIVING ENVIRONMENT:  Home Environment: Independent living  # Steps to Enter: 2  One/Two Story Residence: One story  Living Alone: No  Support Systems: Child(christian)    OBJECTIVE:     PAIN: VITAL SIGNS: LINES/DRAINS:   Pre Treatment: Pain Screen  Pain Scale 1: Numeric (0 - 10)  Pain Intensity 1: 0  Post Treatment: 0   IV  O2 Device: Hi flow nasal cannula     ACTIVITIES OF DAILY LIVING: I Mod I S SBA CGA Min Mod Max Total NT Comments   BASIC ADLs:              Bathing/ Showering [] [] [] [] [] [] [] [] [] []    Toileting [] [] [] [] [] [] [] [] [x] [] X 2   Dressing [] [] [] [] [] [] [x] [] [] [] Socks    Feeding [] [] [] [] [] [] [] [] [] []    Grooming [] [] [] [] [] [] [] [] [] []    Personal Device Care [] [] [] [] [] [] [] [] [] []    Functional Mobility [] [] [] [] [] [] [] [] [] []    I=Independent, Mod I=Modified Independent, S=Supervision, SBA=Standby Assistance, CGA=Contact Guard Assistance,   Min=Minimal Assistance, Mod=Moderate Assistance, Max=Maximal Assistance, Total=Total Assistance, NT=Not Tested    MOBILITY: I Mod I S SBA CGA Min Mod Max Total  NT x2 Comments:   Supine to sit [] [] [] [] [] [x] [] [] [] [] [x]    Sit to supine [] [] [] [] [] [] [] [] [] [] []    Sit to stand [] [] [] [] [] [x] [x] [] [] [] [x]    Bed to chair [] [] [] [] [] [x] [x] [] [] [] [x]    I=Independent, Mod I=Modified Independent, S=Supervision, SBA=Standby Assistance, CGA=Contact Guard Assistance,   Min=Minimal Assistance, Mod=Moderate Assistance, Max=Maximal Assistance, Total=Total Assistance, NT=Not Tested    BALANCE: Good Fair+ Fair Fair- Poor NT Comments   Sitting Static [] [] [] [] [] []    Sitting Dynamic [] [] [] [] [] []              Standing Static [] [] [] [] [] []    Standing Dynamic [] [] [] [] [] []      PLAN:   FREQUENCY/DURATION: OT Plan of Care: 3 times/week for duration of hospital stay or until stated goals are met, whichever comes first.    TREATMENT:   TREATMENT:   ($$ Self Care/Home Management: 23-37 mins    )  Co-Treatment PT/OT necessary due to patient's decreased overall endurance/tolerance levels, as well as need for high level skilled assistance to complete functional transfers/mobility and functional tasks  Self Care (23 Minutes): Self care including Toileting to increase independence and decrease level of assistance required.     TREATMENT GRID:  N/A    AFTER TREATMENT POSITION/PRECAUTIONS:  Chair, Needs within reach and RN notified    INTERDISCIPLINARY COLLABORATION:  RN/PCT, PT/PTA and OT/TORREZ    TOTAL TREATMENT DURATION:  OT Patient Time In/Time Out  Time In: 1340  Time Out: Sadaf Powell

## 2022-04-29 NOTE — PROGRESS NOTES
Elder Murphy  Admission Date: 4/27/2022             Daily Progress Note: 4/29/2022    The patient's chart is reviewed and the patient is discussed with the staff. Elder Murphy is a 79 y.o. female with a past medical history significant for type 2 diabetes mellitus, O2 dependent chronic obstructive pulmonary disease presents with chief complaint of generalized weakness. She endorses a 1 week history of dysuria. Upon arrival to the emergency department the patient found profoundly hypotensive. Routine screening labs revealed a urine analysis remarkable for pyuria and hematuria bacteria, leukocytosis and lactic acidemia. The patient was diagnosed with septic shock due to urinary tract infection and referred for ICU admission critical care consultation. .       Subjective:     Was moved out of ICU    Current Facility-Administered Medications   Medication Dose Route Frequency    sodium chloride (NS) flush 5-40 mL  5-40 mL IntraVENous Q8H    sodium chloride (NS) flush 5-40 mL  5-40 mL IntraVENous PRN    acetaminophen (TYLENOL) tablet 650 mg  650 mg Oral Q6H PRN    Or    acetaminophen (TYLENOL) suppository 650 mg  650 mg Rectal Q6H PRN    polyethylene glycol (MIRALAX) packet 17 g  17 g Oral DAILY PRN    cefTRIAXone (ROCEPHIN) 2 g in 0.9% sodium chloride (MBP/ADV) 50 mL MBP  2 g IntraVENous Q24H    heparin (porcine) injection 5,000 Units  5,000 Units SubCUTAneous Q8H    gabapentin (NEURONTIN) capsule 100 mg  100 mg Oral TID    atorvastatin (LIPITOR) tablet 10 mg  10 mg Oral DAILY    mometasone-formoterol (DULERA) 200mcg-5mcg/puff  2 Puff Inhalation BID RT    insulin lispro (HUMALOG) injection 0-10 Units  0-10 Units SubCUTAneous AC&HS    tuberculin injection 5 Units  5 Units IntraDERMal ONCE    0.9% sodium chloride infusion  75 mL/hr IntraVENous CONTINUOUS    albuterol-ipratropium (DUO-NEB) 2.5 MG-0.5 MG/3 ML  3 mL Nebulization Q4H PRN    HYDROcodone-acetaminophen (NORCO) 5-325 mg per tablet 1 Tablet  1 Tablet Oral Q6H PRN    diphenhydrAMINE (BENADRYL) capsule 25 mg  25 mg Oral Q6H PRN    cetirizine (ZYRTEC) tablet 5 mg  5 mg Oral DAILY PRN       Review of Systems    Constitutional: negative for fever, chills, sweats  Cardiovascular: negative for chest pain, palpitations, syncope, edema  Gastrointestinal:  negative for dysphagia, reflux, vomiting, diarrhea, abdominal pain, or melena  Neurologic:  negative for focal weakness, numbness, headache    Objective:     Vitals:    04/29/22 0326 04/29/22 0803 04/29/22 0838 04/29/22 1103   BP: (!) 110/33 (!) 97/55  (!) 107/50   Pulse: 76 68  75   Resp: 17 16  20   Temp: 97.8 °F (36.6 °C) 97.9 °F (36.6 °C)  97.8 °F (36.6 °C)   SpO2: 99% 99% 96% 99%   Weight:       Height:             Intake/Output Summary (Last 24 hours) at 4/29/2022 1325  Last data filed at 4/29/2022 0745  Gross per 24 hour   Intake 1340 ml   Output 1375 ml   Net -35 ml       Physical Exam:   Constitution:  the patient is well developed and in no acute distress  EENMT:  Sclera clear, pupils equal, oral mucosa moist  Respiratory: clear  Cardiovascular:  RRR without M,G,R  Gastrointestinal: soft and non-tender; with positive bowel sounds. Musculoskeletal: warm without cyanosis. There is plus 1 lower extremity edema.   Skin:  no jaundice or rashes, no  wounds   Neurologic: no gross neuro deficits     Psychiatric:  alert and oriented x 3    CXR:       LAB  Recent Labs     04/29/22  1111 04/29/22  0748 04/28/22  2132 04/28/22  1650 04/28/22  1109   GLUCPOC 110* 132* 123* 112* 98      Recent Labs     04/29/22  0609 04/28/22  0325 04/27/22  2108   WBC 7.9 12.8* 15.2*   HGB 12.1 13.6 14.0   HCT 39.6 44.2 43.2   * 142* 178     Recent Labs     04/29/22  0609 04/28/22  0325 04/27/22  2108    140 137   K 4.1 4.2 4.9   * 108* 105   CO2 29 24 25   GLU 88 117* 117*   BUN 39* 52* 58*   CREA 1.40* 2.00* 2.40*   CA 8.6 8.8 8.8   ALB 2.4* 2.3* 2.6*   TBILI 0.2 0.4 0.6   ALT 24 24 28     No results for input(s): PH, PCO2, PO2, HCO3, PHI, PCO2I, PO2I, HCO3I in the last 72 hours. Recent Labs     04/29/22  0609 04/28/22  0325 04/27/22  2351   LAC 1.3 1.1 4.4*         Assessment:  (Medical Decision Making)     Hospital Problems  Date Reviewed: 8/31/2018          Codes Class Noted POA    Sepsis ([facility] due to UTI ICD-10-CM: A41.9  ICD-9-CM: 038.9, 995.91  4/28/2022 Unknown        * (Principal) Septic shock (HCC)    Resolved ,off pressors ICD-10-CM: A41.9, R65.21  ICD-9-CM: 038.9, 785.52, 995.92  4/28/2022 Unknown        Acute UTI (urinary tract infection) ICD-10-CM: N39.0  ICD-9-CM: 599.0  10/31/2020 Yes        Chronic obstructive lung disease ([facility] [address]  On [facility] a home  ICD-10-CM: J44.9  ICD-9-CM: 749  9/18/2014 Yes        Obstructive sleep apnea syndrome nor on CPAP ICD-10-CM: G47.33  ICD-9-CM: 327.23  10/4/2013 Yes        Essential hypertension ICD-10-CM: I10  ICD-9-CM: 401.9  9/18/2012 Yes              Plan:  (Medical Decision Making)     --continue ABX per primary  -continue nebs  - on 5L as at home  - nothing else to add  Will sign off  Appreciate hospitalist taking over the care      More than 50% of the time documented was spent in face-to-face contact with the patient and in the care of the patient on the floor/unit where the patient is located.     Rebecca Kendall MD

## 2022-04-29 NOTE — ACP (ADVANCE CARE PLANNING)
Advanced Care Planning (Initial Encounter)      Name: Henri Herrmann            Age: 79 y.o. Sex: female  : 1940    MRN:     [id number]    Date of ACP Conversation: 22    Conversation Conducted with: Patient with Decision Making Capacity     Patient is AAOx3 and has adequate capacity to make medical decisions. In the event that she is no longer able to make medical decision, she would like her daughter to make medical decisions. Discussed the current conditions, workup/management plans as well as prognosis. The patient has been informed and is fully aware of the sufficient risks of the active diagnosis and risk for further deterioration due to the underlying condition. In the event of cardiopulmonary arrest, patient would like us to perform resuscitation including chest compression and intubation.       Time Spent face to face with patient/family:  16 mins (This is addition to time spent for clinical care)        Code Status: Full Code   Designated Health Care Decision Maker: Glo Jacome (Daughter)      Na Bowie MD

## 2022-04-29 NOTE — PROGRESS NOTES
[facility] CRITICAL CARE OUTREACH NURSE PROGRESS REPORT    SUBJECTIVE: Called to assess patient secondary to transfer from ICU. MEWS Score: 1 (04/29/22 0326)  Vitals:    04/28/22 2308 04/29/22 0326 04/29/22 0803 04/29/22 0838   BP: (!) 101/37 (!) 110/33 (!) 97/55    Pulse: 80 76 68    Resp: 17 17 16    Temp: 97.7 °F (36.5 °C) 97.8 °F (36.6 °C) 97.9 °F (36.6 °C)    SpO2: 93% 99% 99% 96%   Weight:       Height:            LAB DATA:    Recent Labs     04/29/22  0609 04/28/22 0325 04/27/22 2108    140 137   K 4.1 4.2 4.9   * 108* 105   CO2 29 24 25   AGAP 5* 8 7   GLU 88 117* 117*   BUN 39* 52* 58*   CREA 1.40* 2.00* 2.40*   GFRAA 46* 31* 25*   GFRNA 38* 25* 20*   CA 8.6 8.8 8.8   ALB 2.4* 2.3* 2.6*   TP 6.2* 6.4 7.4   GLOB 3.8* 4.1* 4.8*   AGRAT 0.6* 0.6* 0.5*   ALT 24 24 28        Recent Labs     04/29/22  0609 04/28/22 0325 04/27/22 2108   WBC 7.9 12.8* 15.2*   HGB 12.1 13.6 14.0   HCT 39.6 44.2 43.2   * 142* 178          OBJECTIVE: On arrival to room, I found patient to be in bed. Pain Assessment  Pain Intensity 1: 0 (04/29/22 0326)        Patient Stated Pain Goal: 0    ASSESSMENT: Pt alert in bed. On 3L NC with respirations even and unlabored. SpO2 97%. VSS. Receiving a bath at this time. No signs of distress. No needs at this time. PLAN: Will continue to follow per ICU outreach protocol.

## 2022-04-30 LAB
ALBUMIN SERPL-MCNC: 2.3 G/DL (ref 3.2–4.6)
ALBUMIN/GLOB SERPL: 0.6 {RATIO} (ref 1.2–3.5)
ALP SERPL-CCNC: 74 U/L (ref 50–136)
ALT SERPL-CCNC: 29 U/L (ref 12–65)
ANION GAP SERPL CALC-SCNC: 6 MMOL/L (ref 7–16)
AST SERPL-CCNC: 31 U/L (ref 15–37)
BASOPHILS # BLD: 0 K/UL (ref 0–0.2)
BASOPHILS NFR BLD: 0 % (ref 0–2)
BILIRUB SERPL-MCNC: 0.2 MG/DL (ref 0.2–1.1)
BUN SERPL-MCNC: 27 MG/DL (ref 8–23)
CALCIUM SERPL-MCNC: 8.7 MG/DL (ref 8.3–10.4)
CHLORIDE SERPL-SCNC: 112 MMOL/L (ref 98–107)
CO2 SERPL-SCNC: 28 MMOL/L (ref 21–32)
CREAT SERPL-MCNC: 1 MG/DL (ref 0.6–1)
DIFFERENTIAL METHOD BLD: ABNORMAL
EOSINOPHIL # BLD: 0.4 K/UL (ref 0–0.8)
EOSINOPHIL NFR BLD: 6 % (ref 0.5–7.8)
ERYTHROCYTE [DISTWIDTH] IN BLOOD BY AUTOMATED COUNT: 13.6 % (ref 11.9–14.6)
GLOBULIN SER CALC-MCNC: 4 G/DL (ref 2.3–3.5)
GLUCOSE BLD STRIP.AUTO-MCNC: 85 MG/DL (ref 65–100)
GLUCOSE BLD STRIP.AUTO-MCNC: 86 MG/DL (ref 65–100)
GLUCOSE BLD STRIP.AUTO-MCNC: 87 MG/DL (ref 65–100)
GLUCOSE BLD STRIP.AUTO-MCNC: 88 MG/DL (ref 65–100)
GLUCOSE SERPL-MCNC: 88 MG/DL (ref 65–100)
HCT VFR BLD AUTO: 39.4 % (ref 35.8–46.3)
HGB BLD-MCNC: 12.1 G/DL (ref 11.7–15.4)
IMM GRANULOCYTES # BLD AUTO: 0 K/UL (ref 0–0.5)
IMM GRANULOCYTES NFR BLD AUTO: 1 % (ref 0–5)
LYMPHOCYTES # BLD: 2.1 K/UL (ref 0.5–4.6)
LYMPHOCYTES NFR BLD: 28 % (ref 13–44)
MCH RBC QN AUTO: 30.2 PG (ref 26.1–32.9)
MCHC RBC AUTO-ENTMCNC: 30.7 G/DL (ref 31.4–35)
MCV RBC AUTO: 98.3 FL (ref 79.6–97.8)
MM INDURATION POC: 0 MM (ref 0–5)
MONOCYTES # BLD: 1 K/UL (ref 0.1–1.3)
MONOCYTES NFR BLD: 14 % (ref 4–12)
NEUTS SEG # BLD: 3.9 K/UL (ref 1.7–8.2)
NEUTS SEG NFR BLD: 52 % (ref 43–78)
NRBC # BLD: 0 K/UL (ref 0–0.2)
PLATELET # BLD AUTO: 167 K/UL (ref 150–450)
PMV BLD AUTO: 12.6 FL (ref 9.4–12.3)
POTASSIUM SERPL-SCNC: 4 MMOL/L (ref 3.5–5.1)
PPD POC: NEGATIVE NEGATIVE
PROT SERPL-MCNC: 6.3 G/DL (ref 6.3–8.2)
RBC # BLD AUTO: 4.01 M/UL (ref 4.05–5.2)
SERVICE CMNT-IMP: NORMAL
SODIUM SERPL-SCNC: 146 MMOL/L (ref 136–145)
WBC # BLD AUTO: 7.5 K/UL (ref 4.3–11.1)

## 2022-04-30 PROCEDURE — 85025 COMPLETE CBC W/AUTO DIFF WBC: CPT

## 2022-04-30 PROCEDURE — 74011000250 HC RX REV CODE- 250: Performed by: INTERNAL MEDICINE

## 2022-04-30 PROCEDURE — 74011000258 HC RX REV CODE- 258: Performed by: INTERNAL MEDICINE

## 2022-04-30 PROCEDURE — 2709999900 HC NON-CHARGEABLE SUPPLY

## 2022-04-30 PROCEDURE — 80053 COMPREHEN METABOLIC PANEL: CPT

## 2022-04-30 PROCEDURE — 94664 DEMO&/EVAL PT USE INHALER: CPT

## 2022-04-30 PROCEDURE — 77030018842 HC SOL IRR SOD CL 9% BAXT -A

## 2022-04-30 PROCEDURE — 36415 COLL VENOUS BLD VENIPUNCTURE: CPT

## 2022-04-30 PROCEDURE — 74011250636 HC RX REV CODE- 250/636: Performed by: INTERNAL MEDICINE

## 2022-04-30 PROCEDURE — 77010033678 HC OXYGEN DAILY

## 2022-04-30 PROCEDURE — 94640 AIRWAY INHALATION TREATMENT: CPT

## 2022-04-30 PROCEDURE — 96372 THER/PROPH/DIAG INJ SC/IM: CPT

## 2022-04-30 PROCEDURE — 96376 TX/PRO/DX INJ SAME DRUG ADON: CPT

## 2022-04-30 PROCEDURE — 74011250637 HC RX REV CODE- 250/637: Performed by: PHYSICIAN ASSISTANT

## 2022-04-30 PROCEDURE — 74011250637 HC RX REV CODE- 250/637: Performed by: INTERNAL MEDICINE

## 2022-04-30 PROCEDURE — 74011250636 HC RX REV CODE- 250/636: Performed by: PHYSICIAN ASSISTANT

## 2022-04-30 PROCEDURE — 82962 GLUCOSE BLOOD TEST: CPT

## 2022-04-30 PROCEDURE — 74011250637 HC RX REV CODE- 250/637: Performed by: STUDENT IN AN ORGANIZED HEALTH CARE EDUCATION/TRAINING PROGRAM

## 2022-04-30 PROCEDURE — 65270000029 HC RM PRIVATE

## 2022-04-30 PROCEDURE — 94760 N-INVAS EAR/PLS OXIMETRY 1: CPT

## 2022-04-30 PROCEDURE — G0378 HOSPITAL OBSERVATION PER HR: HCPCS

## 2022-04-30 RX ORDER — VALACYCLOVIR HYDROCHLORIDE 500 MG/1
1000 TABLET, FILM COATED ORAL EVERY 8 HOURS
Status: DISCONTINUED | OUTPATIENT
Start: 2022-04-30 | End: 2022-05-01

## 2022-04-30 RX ADMIN — VALACYCLOVIR HYDROCHLORIDE 1000 MG: 500 TABLET, FILM COATED ORAL at 21:06

## 2022-04-30 RX ADMIN — SODIUM CHLORIDE 75 ML/HR: 9 INJECTION, SOLUTION INTRAVENOUS at 11:31

## 2022-04-30 RX ADMIN — HEPARIN SODIUM 5000 UNITS: 5000 INJECTION INTRAVENOUS; SUBCUTANEOUS at 21:06

## 2022-04-30 RX ADMIN — GABAPENTIN 100 MG: 100 CAPSULE ORAL at 21:06

## 2022-04-30 RX ADMIN — HEPARIN SODIUM 5000 UNITS: 5000 INJECTION INTRAVENOUS; SUBCUTANEOUS at 13:20

## 2022-04-30 RX ADMIN — GABAPENTIN 100 MG: 100 CAPSULE ORAL at 15:16

## 2022-04-30 RX ADMIN — MOMETASONE FUROATE AND FORMOTEROL FUMARATE DIHYDRATE 2 PUFF: 200; 5 AEROSOL RESPIRATORY (INHALATION) at 08:03

## 2022-04-30 RX ADMIN — HEPARIN SODIUM 5000 UNITS: 5000 INJECTION INTRAVENOUS; SUBCUTANEOUS at 06:42

## 2022-04-30 RX ADMIN — SODIUM CHLORIDE, PRESERVATIVE FREE 10 ML: 5 INJECTION INTRAVENOUS at 21:06

## 2022-04-30 RX ADMIN — SODIUM CHLORIDE, PRESERVATIVE FREE 5 ML: 5 INJECTION INTRAVENOUS at 06:42

## 2022-04-30 RX ADMIN — HYDROCODONE BITARTRATE AND ACETAMINOPHEN 1 TABLET: 5; 325 TABLET ORAL at 21:06

## 2022-04-30 RX ADMIN — ATORVASTATIN CALCIUM 10 MG: 10 TABLET, FILM COATED ORAL at 09:17

## 2022-04-30 RX ADMIN — HYDROCODONE BITARTRATE AND ACETAMINOPHEN 1 TABLET: 5; 325 TABLET ORAL at 11:29

## 2022-04-30 RX ADMIN — MOMETASONE FUROATE AND FORMOTEROL FUMARATE DIHYDRATE 2 PUFF: 200; 5 AEROSOL RESPIRATORY (INHALATION) at 20:28

## 2022-04-30 RX ADMIN — SODIUM CHLORIDE, PRESERVATIVE FREE 5 ML: 5 INJECTION INTRAVENOUS at 13:21

## 2022-04-30 RX ADMIN — GABAPENTIN 100 MG: 100 CAPSULE ORAL at 09:17

## 2022-04-30 RX ADMIN — CEFTRIAXONE 2 G: 2 INJECTION, POWDER, FOR SOLUTION INTRAMUSCULAR; INTRAVENOUS at 01:18

## 2022-04-30 NOTE — PROGRESS NOTES
Date of Outreach Update:  Geovanni Silverio was seen and assessed. MEWS Score: 1 (04/30/22 0329)  Vitals:    04/29/22 1955 04/29/22 2018 04/29/22 2312 04/30/22 0329   BP:  (!) 124/52 (!) 144/67 (!) 123/47   Pulse:  69 71 68   Resp:  19 19 20   Temp:  98.6 °F (37 °C) 98.2 °F (36.8 °C) 98.1 °F (36.7 °C)   SpO2: 97% 93% 93% 93%   Weight:       Height:             Pain Assessment  Pain Intensity 1: 0 (04/30/22 0300)        Patient Stated Pain Goal: 0      Previous Outreach assessment has been reviewed. There have been no significant clinical changes since the completion of the last dated Outreach assessment. Will continue to follow up per outreach protocol.     Signed By:   Nikko Ireland    April 30, 2022 5:40 AM

## 2022-04-30 NOTE — PROGRESS NOTES
Hospitalist Progress Note   Admit Date:  2022  9:34 PM   Name:  Angella Aiken   Age:  79 y.o. Sex:  female  :  1940   MRN:  [id number]   Room:  520/01    Presenting Complaint: Generalized Body Aches    Reason(s) for Admission: Sepsis [facility]) [A41.9]     Hospital Course & Interval History:   Angella Aiken is a 79 y.o. female with history of COPD, chronic hypoxic resp failure (5L NC continous), HTN, morbid obesity, hx CVA, diet controlled diabetes mellitus, hx of nephrolithiasis, remote shingles who presented to the ER c/o dysuria x 1 week along with body aches, generalized weakness. In the ER pt's initial BP was 199/110 but upon recheck, BP was 61/47. Pt did have a low-grade fever of 99.5 and elevated WBC count and lactic acid levels. Despite IVF boluses, pt remained hypotensive and therefore levophed gtt was initiated and pulmonary critical care consulted for ICU admission for septic shock. Cxray without gross infiltrates / pulmonary edema.     Pt was given vanco-zosyn in the ER and then started on rocephin in the ICU. She was weaned off IV pressors overnight and was transferred out of the ICU thereafter.   [facility] service consulted to assume care on 2022    Subjective/24hr Events (22):  22  On 3 lit/min   says doing fine  Improving creatinine    22  Says doing okay  Improving kidney function  Urine culture E. coli and also showing gram-positive cocci with sensitivities pending    Assessment & Plan:       Septic shock  - resolving  - attributed to urinary process (hx of e coli / aerococcus viridans UTI)  - closely monitor BP, gentle IVF (off pressors)         Sepsis secondary to UTI  - cont empiric rocephin  2022- urine culture gram negative rods  Blood culture coag neg staph one bottle- will repeat blood culture  22-Urine culture E. coli and also showing gram-positive cocci with sensitivities pending  Repeat blood cultures negative  Continue Rocephin.       Lactic acidosis  - in setting of sepsis  - cont IVF, empiric abx  - f/u AM levels   4/29/2022- resolved       ATN / GILMAR  - avoid nephrotoxic rx  - cont gentle IVF  - f/u AM BMP  4/29/2022- improving on ivf- creatinine 1.4  4/30/22-continue fluids, creatinine 1.       Chronic hypoxic resp failure   - cont supplemental O2; again home O2 requirements 5L via NC  4/29/2022- presently on 3 lit/min       COPD / reformed cigarette smoker  - add flutter valve and scheduled ROGELIO nebs  - cont ICS-LABA       Morbid obesity  - adds to complexity   - encourage wt loss       Diet controlled diabetes mellitus  - last hgbA1c 5.5   - closely monitor; low-carb diet       Essential hypertension  - antihypertensive agent on hold in setting of septic shock  - closely monitor BP       Remote shingles  - cont neurontin tid for neuralgia  - prn norco with parameters to hold if SBP <100mmhg       Debility   - appreciate PT/OT eval, recommending STR       Discharge Planning:    - anticipate at least 2-3 midnight hospitalization     Diet:  ADULT DIET Regular; 3 carb choices (45 gm/meal)  DVT PPx: Hep sc  Code status: Full Code       Hospital Problems as of 4/30/2022 Date Reviewed: 8/31/2018          Codes Class Noted - Resolved POA    Sepsis (R[facility].) ICD-10-CM: A41.9  ICD-9-CM: 038.9, 995.91  4/28/2022 - Present Unknown        * (Principal) Septic shock ([facility].) ICD-10-CM: A41.9, R65.21  ICD-9-CM: 038.9, 785.52, 995.92  4/28/2022 - Present Unknown        Acute UTI (urinary tract infection) ICD-10-CM: N39.0  ICD-9-CM: 599.0  10/31/2020 - Present Yes        Chronic obstructive lung disease ([facility].) ICD-10-CM: J44.9  ICD-9-CM: 496  9/18/2014 - Present Yes        Obstructive sleep apnea syndrome ICD-10-CM: G47.33  ICD-9-CM: 327.23  10/4/2013 - Present Yes        Essential hypertension ICD-10-CM: I10  ICD-9-CM: 401.9  9/18/2012 - Present Yes              Objective:     Patient Vitals for the past 24 hrs:   Temp Pulse Resp BP SpO2   04/30/22 3165 -- -- -- -- 94 %   04/30/22 0749 97.8 °F (36.6 °C) 67 20 (!) 119/54 93 %   04/30/22 0329 98.1 °F (36.7 °C) 68 20 (!) 123/47 93 %   04/29/22 2312 98.2 °F (36.8 °C) 71 19 (!) 144/67 93 %   04/29/22 2018 98.6 °F (37 °C) 69 19 (!) 124/52 93 %   04/29/22 1955 -- -- -- -- 97 %   04/29/22 1432 98.4 °F (36.9 °C) 71 20 121/71 98 %     Oxygen Therapy  O2 Sat (%): 94 % (04/30/22 0806)  Pulse via Oximetry: 65 beats per minute (04/30/22 0806)  O2 Device: Nasal cannula (04/30/22 0730)  Skin Assessment: Clean, dry, & intact (04/28/22 1920)  Skin Protection for O2 Device: N/A (04/28/22 1920)  O2 Flow Rate (L/min): 3 l/min (04/30/22 0806)    Estimated body mass index is 39.25 kg/m² as calculated from the following:    Height as of this encounter: 5' 3\" (1.6 m). Weight as of this encounter: 100.5 kg (221 lb 9 oz). Intake/Output Summary (Last 24 hours) at 4/30/2022 1134  Last data filed at 4/30/2022 0646  Gross per 24 hour   Intake 940 ml   Output 1200 ml   Net -260 ml         Physical Exam:     Blood pressure (!) 119/54, pulse 67, temperature 97.8 °F (36.6 °C), resp. rate 20, height 5' 3\" (1.6 m), weight 100.5 kg (221 lb 9 oz), SpO2 94 %, not currently breastfeeding. General:    Well nourished. No overt distress, on oxygen nasal cannula 3 lit/min  Head:  Normocephalic, atraumatic  Eyes:  Sclerae appear normal.  Pupils equally round. ENT:  Nares appear normal, no drainage. Moist oral mucosa  Neck:  No restricted ROM. Trachea midline   CV:   RRR. No m/r/g. No jugular venous distension. Lungs:   CTAB. No wheezing, rhonchi, or rales. Respirations even, unlabored  Abdomen: Bowel sounds present. Soft, nontender, nondistended. Extremities: No cyanosis or clubbing. No edema  Skin:     No rashes and normal coloration. Warm and dry. Neuro:  CN II-XII grossly intact. Sensation intact. A&Ox3  Psych:  Normal mood and affect.       I have reviewed ordered lab tests and independently visualized imaging below:    Recent Labs:  Recent Results (from the past 48 hour(s))   GLUCOSE, POC    Collection Time: 04/28/22  4:50 PM   Result Value Ref Range    Glucose (POC) 112 (H) 65 - 100 mg/dL    Performed by Ivania    NT-PRO BNP    Collection Time: 04/28/22  5:01 PM   Result Value Ref Range    NT pro- (H) <450 PG/ML   GLUCOSE, POC    Collection Time: 04/28/22  9:32 PM   Result Value Ref Range    Glucose (POC) 123 (H) 65 - 100 mg/dL    Performed by Mukund    CBC WITH AUTOMATED DIFF    Collection Time: 04/29/22  6:09 AM   Result Value Ref Range    WBC 7.9 4.3 - 11.1 K/uL    RBC 3.94 (L) 4.05 - 5.2 M/uL    HGB 12.1 11.7 - 15.4 g/dL    HCT 39.6 35.8 - 46.3 %    .5 (H) 79.6 - 97.8 FL    MCH 30.7 26.1 - 32.9 PG    MCHC 30.6 (L) 31.4 - 35.0 g/dL    RDW 13.7 11.9 - 14.6 %    PLATELET 971 (L) 111 - 450 K/uL    MPV 12.9 (H) 9.4 - 12.3 FL    ABSOLUTE NRBC 0.00 0.0 - 0.2 K/uL    DF AUTOMATED      NEUTROPHILS 54 43 - 78 %    LYMPHOCYTES 23 13 - 44 %    MONOCYTES 17 (H) 4.0 - 12.0 %    EOSINOPHILS 6 0.5 - 7.8 %    BASOPHILS 0 0.0 - 2.0 %    IMMATURE GRANULOCYTES 1 0.0 - 5.0 %    ABS. NEUTROPHILS 4.2 1.7 - 8.2 K/UL    ABS. LYMPHOCYTES 1.8 0.5 - 4.6 K/UL    ABS. MONOCYTES 1.3 0.1 - 1.3 K/UL    ABS. EOSINOPHILS 0.4 0.0 - 0.8 K/UL    ABS. BASOPHILS 0.0 0.0 - 0.2 K/UL    ABS. IMM.  GRANS. 0.0 0.0 - 0.5 K/UL   LACTIC ACID    Collection Time: 04/29/22  6:09 AM   Result Value Ref Range    Lactic acid 1.3 0.4 - 2.0 MMOL/L   METABOLIC PANEL, COMPREHENSIVE    Collection Time: 04/29/22  6:09 AM   Result Value Ref Range    Sodium 145 136 - 145 mmol/L    Potassium 4.1 3.5 - 5.1 mmol/L    Chloride 111 (H) 98 - 107 mmol/L    CO2 29 21 - 32 mmol/L    Anion gap 5 (L) 7 - 16 mmol/L    Glucose 88 65 - 100 mg/dL    BUN 39 (H) 8 - 23 MG/DL    Creatinine 1.40 (H) 0.6 - 1.0 MG/DL    GFR est AA 46 (L) >60 ml/min/1.73m2    GFR est non-AA 38 (L) >60 ml/min/1.73m2    Calcium 8.6 8.3 - 10.4 MG/DL    Bilirubin, total 0.2 0.2 - 1.1 MG/DL ALT (SGPT) 24 12 - 65 U/L    AST (SGOT) 29 15 - 37 U/L    Alk. phosphatase 76 50 - 136 U/L    Protein, total 6.2 (L) 6.3 - 8.2 g/dL    Albumin 2.4 (L) 3.2 - 4.6 g/dL    Globulin 3.8 (H) 2.3 - 3.5 g/dL    A-G Ratio 0.6 (L) 1.2 - 3.5     GLUCOSE, POC    Collection Time: 04/29/22  7:48 AM   Result Value Ref Range    Glucose (POC) 132 (H) 65 - 100 mg/dL    Performed by Ivania    CULTURE, BLOOD    Collection Time: 04/29/22  9:48 AM    Specimen: Blood   Result Value Ref Range    Special Requests: RIGHT  FOREARM        Culture result: NO GROWTH AFTER 20 HOURS     CULTURE, BLOOD    Collection Time: 04/29/22  9:51 AM    Specimen: Blood   Result Value Ref Range    Special Requests: LEFT  FOREARM        Culture result: NO GROWTH AFTER 20 HOURS     GLUCOSE, POC    Collection Time: 04/29/22 11:11 AM   Result Value Ref Range    Glucose (POC) 110 (H) 65 - 100 mg/dL    Performed by Saadia.     COVID-19 RAPID TEST    Collection Time: 04/29/22  2:24 PM   Result Value Ref Range    Specimen source NASAL      COVID-19 rapid test Not detected NOTD     GLUCOSE, POC    Collection Time: 04/29/22  4:15 PM   Result Value Ref Range    Glucose (POC) 93 65 - 100 mg/dL    Performed by [address] [place] [address] PPD TEST IN 24 HRS    Collection Time: 04/29/22  5:03 PM   Result Value Ref Range    PPD Negative Negative    mm Induration 0 0 - 5 mm   GLUCOSE, POC    Collection Time: 04/29/22  9:53 PM   Result Value Ref Range    Glucose (POC) 98 65 - 100 mg/dL    Performed by KendalCT    METABOLIC PANEL, COMPREHENSIVE    Collection Time: 04/30/22  4:19 AM   Result Value Ref Range    Sodium 146 (H) 136 - 145 mmol/L    Potassium 4.0 3.5 - 5.1 mmol/L    Chloride 112 (H) 98 - 107 mmol/L    CO2 28 21 - 32 mmol/L    Anion gap 6 (L) 7 - 16 mmol/L    Glucose 88 65 - 100 mg/dL    BUN 27 (H) 8 - 23 MG/DL    Creatinine 1.00 0.6 - 1.0 MG/DL    GFR est AA >60 >60 ml/min/1.73m2    GFR est non-AA 56 (L) >60 ml/min/1.73m2 Calcium 8.7 8.3 - 10.4 MG/DL    Bilirubin, total 0.2 0.2 - 1.1 MG/DL    ALT (SGPT) 29 12 - 65 U/L    AST (SGOT) 31 15 - 37 U/L    Alk. phosphatase 74 50 - 136 U/L    Protein, total 6.3 6.3 - 8.2 g/dL    Albumin 2.3 (L) 3.2 - 4.6 g/dL    Globulin 4.0 (H) 2.3 - 3.5 g/dL    A-G Ratio 0.6 (L) 1.2 - 3.5     CBC WITH AUTOMATED DIFF    Collection Time: 04/30/22  4:19 AM   Result Value Ref Range    WBC 7.5 4.3 - 11.1 K/uL    RBC 4.01 (L) 4.05 - 5.2 M/uL    HGB 12.1 11.7 - 15.4 g/dL    HCT 39.4 35.8 - 46.3 %    MCV 98.3 (H) 79.6 - 97.8 FL    MCH 30.2 26.1 - 32.9 PG    MCHC 30.7 (L) 31.4 - 35.0 g/dL    RDW 13.6 11.9 - 14.6 %    PLATELET 639 393 - 768 K/uL    MPV 12.6 (H) 9.4 - 12.3 FL    ABSOLUTE NRBC 0.00 0.0 - 0.2 K/uL    DF AUTOMATED      NEUTROPHILS 52 43 - 78 %    LYMPHOCYTES 28 13 - 44 %    MONOCYTES 14 (H) 4.0 - 12.0 %    EOSINOPHILS 6 0.5 - 7.8 %    BASOPHILS 0 0.0 - 2.0 %    IMMATURE GRANULOCYTES 1 0.0 - 5.0 %    ABS. NEUTROPHILS 3.9 1.7 - 8.2 K/UL    ABS. LYMPHOCYTES 2.1 0.5 - 4.6 K/UL    ABS. MONOCYTES 1.0 0.1 - 1.3 K/UL    ABS. EOSINOPHILS 0.4 0.0 - 0.8 K/UL    ABS. BASOPHILS 0.0 0.0 - 0.2 K/UL    ABS. IMM.  GRANS. 0.0 0.0 - 0.5 K/UL   GLUCOSE, POC    Collection Time: 04/30/22  8:08 AM   Result Value Ref Range    Glucose (POC) 85 65 - 100 mg/dL    Performed by [address] [place] [address] PPD TEST IN 48 HRS    Collection Time: 04/30/22 10:31 AM   Result Value Ref Range    PPD Negative Negative    mm Induration 0 0 - 5 mm       All Micro Results     Procedure Component Value Units Date/Time    CULTURE, URINE [[id number]]  (Abnormal)  (Susceptibility) Collected: 04/27/22 2217    Order Status: Completed Specimen: Cath Urine Updated: 04/30/22 0718     Special Requests: NO SPECIAL REQUESTS        Culture result:       >100,000 COLONIES/mL ESCHERICHIA COLI                  50,000-100,000 COLONIES/mL GRAM POSITIVE COCCI IDENTIFICATION AND SUSCEPTIBILITY TO FOLLOW          CULTURE, BLOOD [[id number]] Collected: 04/29/22 0951    Order Status: Completed Specimen: Blood Updated: 04/30/22 0640     Special Requests: --        LEFT  FOREARM       Culture result: NO GROWTH AFTER 20 HOURS       CULTURE, BLOOD [[id number]] Collected: 04/29/22 0948    Order Status: Completed Specimen: Blood Updated: 04/30/22 0640     Special Requests: --        RIGHT  FOREARM       Culture result: NO GROWTH AFTER 20 HOURS       BLOOD CULTURE [[id number]] Collected: 04/28/22 0327    Order Status: Completed Specimen: Blood Updated: 04/30/22 0640     Special Requests: --        LEFT  FOREARM       Culture result: NO GROWTH 2 DAYS       COVID-19 RAPID TEST [[id number]] Collected: 04/29/22 1424    Order Status: Completed Specimen: Nasopharyngeal Updated: 04/29/22 1453     Specimen source NASAL        COVID-19 rapid test Not detected        Comment:      The specimen is NEGATIVE for SARS-CoV-2, the novel coronavirus associated with COVID-19. A negative result does not rule out COVID-19. This test has been authorized by the FDA under an Emergency Use Authorization (EUA) for use by authorized laboratories. Fact sheet for Healthcare Providers: ConventionUpdate.co.nz  Fact sheet for Patients: ConventionUpdate.co.nz       Methodology: Isothermal Nucleic Acid Amplification         BLOOD CULTURE ID PANEL [[id number]]  (Abnormal) Collected: 04/27/22 2207    Order Status: Completed Specimen: Blood Updated: 04/29/22 0654     Acc. no. from Micro Order [id number]     Staphylococcus Detected        Comment: RESULTS VERIFIED, PHONED TO AND READ BACK BY  MELINDA GASPAR RN @ 5202 ON 4/28/22 MG           mecA (Methicillin-Resistance Genes) Detected        Comment: Presence of mecA is highly indicative of methicillin resistance. The test does not replace traditional culture and susceptibilities        INTERPRETATION       Gram positive cocci in clusters.  Identified by realtime PCR as  Coagulase negative Staphylococci Comment: A single positive culture of coagulase negative Staph is likely to be a contaminant in adult patients. Consider discontinuation of antibiotics for gram positive bloodstream infections if patient asymptomatic. THIS TEST DOES NOT REPLACE SENSITIVITY TESTING.       BLOOD CULTURE [[id number]] Collected: 04/27/22 2207    Order Status: Completed Specimen: Blood Updated: 04/29/22 0526     Special Requests: --        LEFT  HAND       GRAM STAIN       GRAM POSITIVE COCCI GRAM POSITIVE RODS                  AEROBIC AND ANAEROBIC BOTTLES                  RESULTS VERIFIED, PHONED TO AND READ BACK BY MELINDA GASPAR @ 9616 ON 4/28/22 MG                   RESULTS VERIFIED, PHONED TO AND READ BACK BY MAILE ZAVALA RN AT 0526 ON 4/29/22 ACL           Culture result:       CULTURE IN [address] [facility] UPDATES TO FOLLOW                  Refer to Blood Culture ID Panel Accession  [id number]      INFLUENZA A & B AG (RAPID TEST) [[id number]] Collected: 04/27/22 2110    Order Status: Completed Specimen: Nasopharyngeal from Nasal washing Updated: 04/27/22 2231     Influenza A Ag Negative        Comment: NEGATIVE FOR THE PRESENCE OF INFLUENZA A ANTIGEN  INFECTION DUE TO INFLUENZA A CANNOT BE RULED OUT. BECAUSE THE ANTIGEN PRESENT IN THE SAMPLE MAY BE BELOW  THE DETECTION LIMIT OF THE TEST. A NEGATIVE TEST IS PRESUMPTIVE AND IT IS RECOMMENDED THAT THESE RESULTS BE CONFIRMED BY VIRAL CULTURE OR AN FDA-CLEARED INFLUENZA A AND B MOLECULAR ASSAY. Influenza B Ag Negative        Comment: NEGATIVE FOR THE PRESENCE OF INFLUENZA B ANTIGEN  INFECTION DUE TO INFLUENZA B CANNOT BE RULED OUT. BECAUSE THE ANTIGEN PRESENT IN THE SAMPLE MAY BE BELOW  THE DETECTION LIMIT OF THE TEST. A NEGATIVE TEST IS PRESUMPTIVE AND IT IS RECOMMENDED THAT THESE RESULTS BE CONFIRMED BY VIRAL CULTURE OR AN FDA-CLEARED INFLUENZA A AND B MOLECULAR ASSAY. Source Nasopharyngeal             Other Studies:  No results found.     Current Meds:  Current Facility-Administered Medications   Medication Dose Route Frequency    sodium chloride (NS) flush 5-40 mL  5-40 mL IntraVENous Q8H    sodium chloride (NS) flush 5-40 mL  5-40 mL IntraVENous PRN    acetaminophen (TYLENOL) tablet 650 mg  650 mg Oral Q6H PRN    Or    acetaminophen (TYLENOL) suppository 650 mg  650 mg Rectal Q6H PRN    polyethylene glycol (MIRALAX) packet 17 g  17 g Oral DAILY PRN    cefTRIAXone (ROCEPHIN) 2 g in 0.9% sodium chloride (MBP/ADV) 50 mL MBP  2 g IntraVENous Q24H    heparin (porcine) injection 5,000 Units  5,000 Units SubCUTAneous Q8H    gabapentin (NEURONTIN) capsule 100 mg  100 mg Oral TID    atorvastatin (LIPITOR) tablet 10 mg  10 mg Oral DAILY    mometasone-formoterol (DULERA) 200mcg-5mcg/puff  2 Puff Inhalation BID RT    insulin lispro (HUMALOG) injection 0-10 Units  0-10 Units SubCUTAneous AC&HS    0.9% sodium chloride infusion  75 mL/hr IntraVENous CONTINUOUS    albuterol-ipratropium (DUO-NEB) 2.5 MG-0.5 MG/3 ML  3 mL Nebulization Q4H PRN    HYDROcodone-acetaminophen (NORCO) 5-325 mg per tablet 1 Tablet  1 Tablet Oral Q6H PRN    diphenhydrAMINE (BENADRYL) capsule 25 mg  25 mg Oral Q6H PRN    cetirizine (ZYRTEC) tablet 5 mg  5 mg Oral DAILY PRN       Signed:  Betzaida Emerson MD

## 2022-04-30 NOTE — PROGRESS NOTES
[facility] CRITICAL CARE OUTREACH NURSE PROGRESS REPORT      SUBJECTIVE: Called to assess patient secondary to transfer from icu. MEWS Score: 1 (04/29/22 2018)  Vitals:    04/29/22 1432 04/29/22 1955 04/29/22 2018 04/29/22 2312   BP: 121/71  (!) 124/52 (!) 144/67   Pulse: 71  69 71   Resp: 20  19    Temp: 98.4 °F (36.9 °C)  98.6 °F (37 °C)    SpO2: 98% 97% 93% 93%   Weight:       Height:            LAB DATA:    Recent Labs     04/29/22  0609 04/28/22  0325 04/27/22  2108    140 137   K 4.1 4.2 4.9   * 108* 105   CO2 29 24 25   AGAP 5* 8 7   GLU 88 117* 117*   BUN 39* 52* 58*   CREA 1.40* 2.00* 2.40*   GFRAA 46* 31* 25*   GFRNA 38* 25* 20*   CA 8.6 8.8 8.8   ALB 2.4* 2.3* 2.6*   TP 6.2* 6.4 7.4   GLOB 3.8* 4.1* 4.8*   AGRAT 0.6* 0.6* 0.5*   ALT 24 24 28        Recent Labs     04/29/22  0609 04/28/22 0325 04/27/22  2108   WBC 7.9 12.8* 15.2*   HGB 12.1 13.6 14.0   HCT 39.6 44.2 43.2   * 142* 178          OBJECTIVE: On arrival to room, I found patient to be in bed with primary RN at bedside. Pain Assessment  Pain Intensity 1: 0 (04/29/22 1526)        Patient Stated Pain Goal: 0      ASSESSMENT: AOx4, VSS, no c/o, lungs CTA on 3LNC, RR even and unlabored. Labs and chart reviewed. No concerns at this time per primary RN. PLAN:  Will continue to follow per outreach protocol.

## 2022-05-01 PROBLEM — J96.11 CHRONIC RESPIRATORY FAILURE WITH HYPOXIA (HCC): Status: ACTIVE | Noted: 2022-05-01

## 2022-05-01 LAB
ANION GAP SERPL CALC-SCNC: 4 MMOL/L (ref 7–16)
BACTERIA SPEC CULT: ABNORMAL
BUN SERPL-MCNC: 21 MG/DL (ref 8–23)
CALCIUM SERPL-MCNC: 8.7 MG/DL (ref 8.3–10.4)
CHLORIDE SERPL-SCNC: 113 MMOL/L (ref 98–107)
CO2 SERPL-SCNC: 30 MMOL/L (ref 21–32)
CREAT SERPL-MCNC: 0.8 MG/DL (ref 0.6–1)
GLUCOSE BLD STRIP.AUTO-MCNC: 111 MG/DL (ref 65–100)
GLUCOSE BLD STRIP.AUTO-MCNC: 79 MG/DL (ref 65–100)
GLUCOSE BLD STRIP.AUTO-MCNC: 94 MG/DL (ref 65–100)
GLUCOSE BLD STRIP.AUTO-MCNC: 99 MG/DL (ref 65–100)
GLUCOSE SERPL-MCNC: 89 MG/DL (ref 65–100)
MM INDURATION POC: 0 MM (ref 0–5)
POTASSIUM SERPL-SCNC: 4.1 MMOL/L (ref 3.5–5.1)
PPD POC: NEGATIVE NEGATIVE
SERVICE CMNT-IMP: ABNORMAL
SERVICE CMNT-IMP: ABNORMAL
SERVICE CMNT-IMP: NORMAL
SODIUM SERPL-SCNC: 147 MMOL/L (ref 136–145)

## 2022-05-01 PROCEDURE — 65270000029 HC RM PRIVATE

## 2022-05-01 PROCEDURE — 96376 TX/PRO/DX INJ SAME DRUG ADON: CPT

## 2022-05-01 PROCEDURE — 74011250636 HC RX REV CODE- 250/636: Performed by: INTERNAL MEDICINE

## 2022-05-01 PROCEDURE — 74011250637 HC RX REV CODE- 250/637: Performed by: FAMILY MEDICINE

## 2022-05-01 PROCEDURE — 80048 BASIC METABOLIC PNL TOTAL CA: CPT

## 2022-05-01 PROCEDURE — 74011000258 HC RX REV CODE- 258: Performed by: INTERNAL MEDICINE

## 2022-05-01 PROCEDURE — 36415 COLL VENOUS BLD VENIPUNCTURE: CPT

## 2022-05-01 PROCEDURE — 74011250636 HC RX REV CODE- 250/636: Performed by: PHYSICIAN ASSISTANT

## 2022-05-01 PROCEDURE — 74011250637 HC RX REV CODE- 250/637: Performed by: PHYSICIAN ASSISTANT

## 2022-05-01 PROCEDURE — 96375 TX/PRO/DX INJ NEW DRUG ADDON: CPT

## 2022-05-01 PROCEDURE — 94640 AIRWAY INHALATION TREATMENT: CPT

## 2022-05-01 PROCEDURE — 74011000250 HC RX REV CODE- 250: Performed by: INTERNAL MEDICINE

## 2022-05-01 PROCEDURE — 77010033678 HC OXYGEN DAILY

## 2022-05-01 PROCEDURE — 94760 N-INVAS EAR/PLS OXIMETRY 1: CPT

## 2022-05-01 PROCEDURE — 94664 DEMO&/EVAL PT USE INHALER: CPT

## 2022-05-01 PROCEDURE — 96372 THER/PROPH/DIAG INJ SC/IM: CPT

## 2022-05-01 PROCEDURE — G0378 HOSPITAL OBSERVATION PER HR: HCPCS

## 2022-05-01 PROCEDURE — 82962 GLUCOSE BLOOD TEST: CPT

## 2022-05-01 PROCEDURE — 74011250637 HC RX REV CODE- 250/637: Performed by: INTERNAL MEDICINE

## 2022-05-01 RX ORDER — NITROFURANTOIN MACROCRYSTALS 50 MG/1
100 CAPSULE ORAL EVERY 12 HOURS
Status: DISCONTINUED | OUTPATIENT
Start: 2022-05-01 | End: 2022-05-01

## 2022-05-01 RX ORDER — ONDANSETRON 2 MG/ML
4 INJECTION INTRAMUSCULAR; INTRAVENOUS
Status: DISCONTINUED | OUTPATIENT
Start: 2022-05-01 | End: 2022-05-02 | Stop reason: HOSPADM

## 2022-05-01 RX ORDER — DULOXETIN HYDROCHLORIDE 60 MG/1
60 CAPSULE, DELAYED RELEASE ORAL DAILY
Status: DISCONTINUED | OUTPATIENT
Start: 2022-05-01 | End: 2022-05-02 | Stop reason: HOSPADM

## 2022-05-01 RX ADMIN — HEPARIN SODIUM 5000 UNITS: 5000 INJECTION INTRAVENOUS; SUBCUTANEOUS at 06:18

## 2022-05-01 RX ADMIN — MOMETASONE FUROATE AND FORMOTEROL FUMARATE DIHYDRATE 2 PUFF: 200; 5 AEROSOL RESPIRATORY (INHALATION) at 21:35

## 2022-05-01 RX ADMIN — SODIUM CHLORIDE, PRESERVATIVE FREE 10 ML: 5 INJECTION INTRAVENOUS at 06:18

## 2022-05-01 RX ADMIN — SODIUM CHLORIDE, PRESERVATIVE FREE 5 ML: 5 INJECTION INTRAVENOUS at 21:00

## 2022-05-01 RX ADMIN — SODIUM CHLORIDE, PRESERVATIVE FREE 5 ML: 5 INJECTION INTRAVENOUS at 13:36

## 2022-05-01 RX ADMIN — HYDROCODONE BITARTRATE AND ACETAMINOPHEN 1 TABLET: 5; 325 TABLET ORAL at 09:13

## 2022-05-01 RX ADMIN — GABAPENTIN 100 MG: 100 CAPSULE ORAL at 09:05

## 2022-05-01 RX ADMIN — DULOXETINE HYDROCHLORIDE 60 MG: 60 CAPSULE, DELAYED RELEASE ORAL at 13:36

## 2022-05-01 RX ADMIN — SODIUM CHLORIDE 75 ML/HR: 9 INJECTION, SOLUTION INTRAVENOUS at 01:18

## 2022-05-01 RX ADMIN — MOMETASONE FUROATE AND FORMOTEROL FUMARATE DIHYDRATE 2 PUFF: 200; 5 AEROSOL RESPIRATORY (INHALATION) at 07:06

## 2022-05-01 RX ADMIN — CEFTRIAXONE 2 G: 2 INJECTION, POWDER, FOR SOLUTION INTRAMUSCULAR; INTRAVENOUS at 01:19

## 2022-05-01 RX ADMIN — ONDANSETRON 4 MG: 2 INJECTION INTRAMUSCULAR; INTRAVENOUS at 18:27

## 2022-05-01 RX ADMIN — HEPARIN SODIUM 5000 UNITS: 5000 INJECTION INTRAVENOUS; SUBCUTANEOUS at 13:36

## 2022-05-01 RX ADMIN — ATORVASTATIN CALCIUM 10 MG: 10 TABLET, FILM COATED ORAL at 09:05

## 2022-05-01 RX ADMIN — GABAPENTIN 100 MG: 100 CAPSULE ORAL at 15:48

## 2022-05-01 RX ADMIN — NITROFURANTOIN MACROCRYSTALS 100 MG: 50 CAPSULE ORAL at 09:05

## 2022-05-01 RX ADMIN — GABAPENTIN 100 MG: 100 CAPSULE ORAL at 21:00

## 2022-05-01 RX ADMIN — HEPARIN SODIUM 5000 UNITS: 5000 INJECTION INTRAVENOUS; SUBCUTANEOUS at 21:00

## 2022-05-01 NOTE — PROGRESS NOTES
PRN Norco given once due to forehead burning pain. Pt complains nausea. MD notified and received Zofran once. VSS at baseline. No voiced needs at this time  Shift report given to ongoing nurse at bedside.

## 2022-05-01 NOTE — PROGRESS NOTES
The pt complains of shingles on forehead, this nurse did not visual see them there does not appear to be an open sore, the doctor was informed and orders received.

## 2022-05-01 NOTE — PROGRESS NOTES
Hospitalist Progress Note   Admit Date:  2022  9:34 PM   Name:  Sara Gray   Age:  79 y.o. Sex:  female  :  1940   MRN:  [id number]   Room:      Presenting Complaint: Generalized Body Aches    Reason(s) for Admission: Sepsis [facility]) [A41.9]     Hospital Course & Interval History:   Sara Gray is a 79 y.o. female with history of COPD, chronic hypoxic resp failure (5L NC continous), HTN, morbid obesity, hx CVA, diet controlled diabetes mellitus, hx of nephrolithiasis, remote shingles who presented to the ER c/o dysuria x 1 week along with body aches, generalized weakness. In the ER pt's initial BP was 199/110 but upon recheck, BP was 61/47. Pt did have a low-grade fever of 99.5 and elevated WBC count and lactic acid levels. Despite IVF boluses, pt remained hypotensive and therefore levophed gtt was initiated and pulmonary critical care consulted for ICU admission for septic shock. Cxray without gross infiltrates / pulmonary edema.     Pt was given vanco-zosyn in the ER and then started on rocephin in the ICU. She was weaned off IV pressors overnight and was transferred out of the ICU thereafter. [address] [place] [address] [facility] consulted to assume care on 2022. Patient was continued on IV fluids, kidney function back to normal.  Patient presently on 3 L/min nasal cannula, normally uses 5 L/min at home. Chronic left frontal region pain, herpetic neuralgia, recently followed up with neurology in 2022, on Cymbalta 60 mg daily. Urine culture positive for E. coli and Aerococcus urinae. Patient on Rocephin. Probably discharge tomorrow to rehab if insurance approved.       Subjective/24hr Events (22):  22  On 3 lit/min   says doing fine  Improving creatinine    22  Says doing okay  Improving kidney function  Urine culture E. coli and also showing gram-positive cocci with sensitivities pending    2022  Mild pain over the left frontal region, chronic going on for several years, since her zoster infection, on Cymbalta at home. Normal kidney function. Oxygen stable on 3 L/min. Assessment & Plan:       Septic shock  - resolving  - attributed to urinary process (hx of e coli / aerococcus viridans UTI)  - closely monitor BP, gentle IVF (off pressors)  5/1/2022-IV fluids discontinued         Sepsis secondary to UTI  - cont empiric rocephin  4/29/2022- urine culture gram negative rods  Blood culture coag neg staph one bottle- will repeat blood culture  4/30/22-Urine culture E. coli and also showing gram-positive cocci with sensitivities pending  Repeat blood cultures negative  Continue Rocephin. 5/1/2022-urine culture positive for E. coli and Aerococcus urinary  Last dose of Rocephin.        Lactic acidosis  - in setting of sepsis  - cont IVF, empiric abx  - f/u AM levels   4/29/2022- resolved       ATN / GILMAR  - avoid nephrotoxic rx  - cont gentle IVF  - f/u AM BMP  4/29/2022- improving on ivf- creatinine 1.4  4/30/22-continue fluids, creatinine 1.  5/1/2022-kidney functions normal, IV fluids discontinued       Chronic hypoxic resp failure   - cont supplemental O2; again home O2 requirements 5L via NC  4/29/2022- presently on 3 lit/min       COPD / reformed cigarette smoker  - add flutter valve and scheduled ROGELIO nebs  - cont ICS-LABA       Morbid obesity  - adds to complexity   - encourage wt loss       Diet controlled diabetes mellitus  - last hgbA1c 5.5   - closely monitor; low-carb diet       Essential hypertension  - antihypertensive agent on hold in setting of septic shock  - closely monitor BP       Remote shingles  - cont neurontin tid for neuralgia  - prn norco with parameters to hold if SBP <100mmhg  5/1/2022-on Cymbalta at home, restarted.     - chronic left foot drop.       Debility   - appreciate PT/OT eval, recommending STR       Discharge Planning:    -Discharge to rehab tomorrow if insurance approval.     Diet:  ADULT DIET Regular; 3 carb choices (45 gm/meal)  DVT PPx: Hep sc  Code status: Full Code       Hospital Problems as of 5/1/2022 Date Reviewed: 8/31/2018          Codes Class Noted - Resolved POA    Chronic respiratory failure with hypoxia [facility]) ICD-10-CM: J96.11  ICD-9-CM: 518.83, 799.02  5/1/2022 - Present Unknown        BMI 40.0-44.9, adult ([facility] ICD-10-CM: Z68.41  ICD-9-CM: V85.41  5/1/2022 - Present Unknown        Sepsis ([facility] ICD-10-CM: A41.9  ICD-9-CM: 038.9, 995.91  4/28/2022 - Present Unknown        * (Principal) Septic shock ([facility] ICD-10-CM: A41.9, R65.21  ICD-9-CM: 038.9, 785.52, 995.92  4/28/2022 - Present Unknown        Acute UTI (urinary tract infection) ICD-10-CM: N39.0  ICD-9-CM: 599.0  10/31/2020 - Present Yes        Post herpetic neuralgia ICD-10-CM: B02.29  ICD-9-CM: 053.19  12/10/2015 - Present Yes    Overview Signed 6/15/2018 10:53 AM by Maite Hair MD     Last Assessment & Plan:   She is doing well. Lyrica and tegretol are well tolerated and are affective. Continue medications. Check labs. Follow up in 1 year.              Chronic obstructive lung disease (HCC) ICD-10-CM: J44.9  ICD-9-CM: 873  9/18/2014 - Present Yes        Obstructive sleep apnea syndrome ICD-10-CM: G47.33  ICD-9-CM: 327.23  10/4/2013 - Present Yes        Essential hypertension ICD-10-CM: I10  ICD-9-CM: 401.9  9/18/2012 - Present Yes              Objective:     Patient Vitals for the past 24 hrs:   Temp Pulse Resp BP SpO2   05/01/22 1115 98.4 °F (36.9 °C) 66 20 115/67 96 %   05/01/22 0805 98.1 °F (36.7 °C) 65 20 (!) 121/57 91 %   05/01/22 0707 -- -- -- -- 95 %   05/01/22 0330 98.1 °F (36.7 °C) 64 18 (!) 148/56 94 %   04/30/22 2247 98.2 °F (36.8 °C) 65 18 (!) 133/47 97 %   04/30/22 2029 -- -- -- -- 98 %   04/30/22 1959 98.7 °F (37.1 °C) 64 18 (!) 147/60 100 %   04/30/22 1532 98.1 °F (36.7 °C) 63 18 (!) 131/53 98 %     Oxygen Therapy  O2 Sat (%): 96 % (05/01/22 1115)  Pulse via Oximetry: 64 beats per minute (05/01/22 0707)  O2 Device: Nasal cannula (05/01/22 1223)  Skin Assessment: Clean, dry, & intact (04/30/22 2029)  Skin Protection for O2 Device: N/A (04/28/22 1920)  O2 Flow Rate (L/min): 3 l/min (05/01/22 1223)  FIO2 (%): 32 % (04/30/22 2029)    Estimated body mass index is 41.27 kg/m² as calculated from the following:    Height as of this encounter: 5' 3\" (1.6 m). Weight as of this encounter: 105.7 kg (233 lb). Intake/Output Summary (Last 24 hours) at 5/1/2022 1343  Last data filed at 5/1/2022 1032  Gross per 24 hour   Intake 240 ml   Output 1600 ml   Net -1360 ml         Physical Exam:     Blood pressure 115/67, pulse 66, temperature 98.4 °F (36.9 °C), resp. rate 20, height 5' 3\" (1.6 m), weight 105.7 kg (233 lb), SpO2 96 %, not currently breastfeeding. General:    Well nourished. No overt distress, on oxygen nasal cannula 3 lit/min  Head:  Normocephalic, atraumatic  Eyes:  Sclerae appear normal.  Pupils equally round. ENT:  Nares appear normal, no drainage. Moist oral mucosa  Neck:  No restricted ROM. Trachea midline   CV:   RRR. No m/r/g. No jugular venous distension. Lungs:   CTAB. No wheezing, rhonchi, or rales. Respirations even, unlabored  Abdomen: Bowel sounds present. Soft, nontender, nondistended. Extremities: No cyanosis or clubbing. No edema  Skin:     No rashes and normal coloration. Warm and dry. Neuro:  CN II-XII grossly intact. Sensation intact. A&Ox3  Psych:  Normal mood and affect.       I have reviewed ordered lab tests and independently visualized imaging below:    Recent Labs:  Recent Results (from the past 48 hour(s))   COVID-19 RAPID TEST    Collection Time: 04/29/22  2:24 PM   Result Value Ref Range    Specimen source NASAL      COVID-19 rapid test Not detected NOTD     GLUCOSE, POC    Collection Time: 04/29/22  4:15 PM   Result Value Ref Range    Glucose (POC) 93 65 - 100 mg/dL    Performed by [address] PPD TEST IN 24 HRS    Collection Time: 04/29/22  5:03 PM   Result Value Ref Range PPD Negative Negative    mm Induration 0 0 - 5 mm   GLUCOSE, POC    Collection Time: 04/29/22  9:53 PM   Result Value Ref Range    Glucose (POC) 98 65 - 100 mg/dL    Performed by [facility]    METABOLIC PANEL, COMPREHENSIVE    Collection Time: 04/30/22  4:19 AM   Result Value Ref Range    Sodium 146 (H) 136 - 145 mmol/L    Potassium 4.0 3.5 - 5.1 mmol/L    Chloride 112 (H) 98 - 107 mmol/L    CO2 28 21 - 32 mmol/L    Anion gap 6 (L) 7 - 16 mmol/L    Glucose 88 65 - 100 mg/dL    BUN 27 (H) 8 - 23 MG/DL    Creatinine 1.00 0.6 - 1.0 MG/DL    GFR est AA >60 >60 ml/min/1.73m2    GFR est non-AA 56 (L) >60 ml/min/1.73m2    Calcium 8.7 8.3 - 10.4 MG/DL    Bilirubin, total 0.2 0.2 - 1.1 MG/DL    ALT (SGPT) 29 12 - 65 U/L    AST (SGOT) 31 15 - 37 U/L    Alk. phosphatase 74 50 - 136 U/L    Protein, total 6.3 6.3 - 8.2 g/dL    Albumin 2.3 (L) 3.2 - 4.6 g/dL    Globulin 4.0 (H) 2.3 - 3.5 g/dL    A-G Ratio 0.6 (L) 1.2 - 3.5     CBC WITH AUTOMATED DIFF    Collection Time: 04/30/22  4:19 AM   Result Value Ref Range    WBC 7.5 4.3 - 11.1 K/uL    RBC 4.01 (L) 4.05 - 5.2 M/uL    HGB 12.1 11.7 - 15.4 g/dL    HCT 39.4 35.8 - 46.3 %    MCV 98.3 (H) 79.6 - 97.8 FL    MCH 30.2 26.1 - 32.9 PG    MCHC 30.7 (L) 31.4 - 35.0 g/dL    RDW 13.6 11.9 - 14.6 %    PLATELET 100 458 - 738 K/uL    MPV 12.6 (H) 9.4 - 12.3 FL    ABSOLUTE NRBC 0.00 0.0 - 0.2 K/uL    DF AUTOMATED      NEUTROPHILS 52 43 - 78 %    LYMPHOCYTES 28 13 - 44 %    MONOCYTES 14 (H) 4.0 - 12.0 %    EOSINOPHILS 6 0.5 - 7.8 %    BASOPHILS 0 0.0 - 2.0 %    IMMATURE GRANULOCYTES 1 0.0 - 5.0 %    ABS. NEUTROPHILS 3.9 1.7 - 8.2 K/UL    ABS. LYMPHOCYTES 2.1 0.5 - 4.6 K/UL    ABS. MONOCYTES 1.0 0.1 - 1.3 K/UL    ABS. EOSINOPHILS 0.4 0.0 - 0.8 K/UL    ABS. BASOPHILS 0.0 0.0 - 0.2 K/UL    ABS. IMM.  GRANS. 0.0 0.0 - 0.5 K/UL   GLUCOSE, POC    Collection Time: 04/30/22  8:08 AM   Result Value Ref Range    Glucose (POC) 85 65 - 100 mg/dL    Performed by [address] [place] [address] [address] DOCUMENT PPD TEST IN 48 HRS    Collection Time: 04/30/22 10:31 AM   Result Value Ref Range    PPD Negative Negative    mm Induration 0 0 - 5 mm   GLUCOSE, POC    Collection Time: 04/30/22 11:42 AM   Result Value Ref Range    Glucose (POC) 88 65 - 100 mg/dL    Performed by [address], POC    Collection Time: 04/30/22  5:01 PM   Result Value Ref Range    Glucose (POC) 86 65 - 100 mg/dL    Performed by [facility]andy GLUCOSE, POC    Collection Time: 04/30/22  9:18 PM   Result Value Ref Range    Glucose (POC) 87 65 - 100 mg/dL    [facility]    METABOLIC PANEL, BASIC    Collection Time: 05/01/22  5:27 AM   Result Value Ref Range    Sodium 147 (H) 136 - 145 mmol/L    Potassium 4.1 3.5 - 5.1 mmol/L    Chloride 113 (H) 98 - 107 mmol/L    CO2 30 21 - 32 mmol/L    Anion gap 4 (L) 7 - 16 mmol/L    Glucose 89 65 - 100 mg/dL    BUN 21 8 - 23 MG/DL    Creatinine 0.80 0.6 - 1.0 MG/DL    GFR est AA >60 >60 ml/min/1.73m2    GFR est non-AA >60 >60 ml/min/1.73m2    Calcium 8.7 8.3 - 10.4 MG/DL   GLUCOSE, POC    Collection Time: 05/01/22  8:10 AM   Result Value Ref Range    Glucose (POC) 79 65 - 100 mg/dL    Performed by [facility]    GLUCOSE, POC    Collection Time: 05/01/22 11:39 AM   Result Value Ref Range    Glucose (POC) 99 65 - 100 mg/dL    Performed by ReynaNicole        All Micro Results     Procedure Component Value Units Date/Time    CULTURE, URINE [[id number]]  (Abnormal)  (Susceptibility) Collected: 04/27/22 2217    Order Status: Completed Specimen: Cath Urine Updated: 05/01/22 0730     Special Requests: NO SPECIAL REQUESTS        Culture result:       >100,000 COLONIES/mL ESCHERICHIA COLI                  50,000-100,000 COLONIES/mL AEROCOCCUS URINAE                  Aerococcus species are usually susceptible to beta-lactams and vancomycin. In addition, Aerococcus urinae is typically susceptible to nitrofurantoin.  A.urinae are usually described as resistant to trimethoprim-sulfmethoxazole. Resistance to fluoroquinolones has been described in Aerococcus species (CLSI, M45, 3rd edition, 2015)          CULTURE, BLOOD [[id number]] Collected: 04/29/22 0948    Order Status: Completed Specimen: Blood Updated: 05/01/22 0641     Special Requests: --        RIGHT  FOREARM       Culture result: NO GROWTH 2 DAYS       CULTURE, BLOOD [[id number]] Collected: 04/29/22 0951    Order Status: Completed Specimen: Blood Updated: 05/01/22 0641     Special Requests: --        LEFT  FOREARM       Culture result: NO GROWTH 2 DAYS       BLOOD CULTURE [[id number]] Collected: 04/28/22 0327    Order Status: Completed Specimen: Blood Updated: 05/01/22 0641     Special Requests: --        LEFT  FOREARM       Culture result: NO GROWTH 3 DAYS       COVID-19 RAPID TEST [[id number]] Collected: 04/29/22 1424    Order Status: Completed Specimen: Nasopharyngeal Updated: 04/29/22 1453     Specimen source NASAL        COVID-19 rapid test Not detected        Comment:      The specimen is NEGATIVE for SARS-CoV-2, the novel coronavirus associated with COVID-19. A negative result does not rule out COVID-19. This test has been authorized by the FDA under an Emergency Use Authorization (EUA) for use by authorized laboratories. Fact sheet for Healthcare Providers: ConventionUpdate.co.nz  Fact sheet for Patients: ConventionUpdate.co.nz       Methodology: Isothermal Nucleic Acid Amplification         BLOOD CULTURE ID PANEL [[id number]]  (Abnormal) Collected: 04/27/22 2207    Order Status: Completed Specimen: Blood Updated: 04/29/22 0654     Acc. no. from Micro Order [id number]     Staphylococcus Detected        Comment: RESULTS VERIFIED, PHONED TO AND READ BACK BY  MELINDA GASPAR RN @ 3649 ON 4/28/22 MG           mecA (Methicillin-Resistance Genes) Detected        Comment: Presence of mecA is highly indicative of methicillin resistance.    The test does not replace traditional culture and susceptibilities        INTERPRETATION       Gram positive cocci in clusters. Identified by realtime PCR as  Coagulase negative Staphylococci           Comment: A single positive culture of coagulase negative Staph is likely to be a contaminant in adult patients. Consider discontinuation of antibiotics for gram positive bloodstream infections if patient asymptomatic. THIS TEST DOES NOT REPLACE SENSITIVITY TESTING.       BLOOD CULTURE [[id number]] Collected: 04/27/22 2207    Order Status: Completed Specimen: Blood Updated: 04/29/22 0526     Special Requests: --        LEFT  HAND       GRAM STAIN       GRAM POSITIVE COCCI GRAM POSITIVE RODS                  AEROBIC AND ANAEROBIC BOTTLES                  RESULTS VERIFIED, PHONED TO AND READ BACK BY MELINDA GASPAR @ 4968 ON 4/28/22 MG                   RESULTS VERIFIED, PHONED TO AND READ BACK BY MAILE ZAVALA RN AT 0526 ON 4/29/22 ACL           Culture result:       CULTURE IN [facility] UPDATES TO FOLLOW                  Refer to Blood Culture ID Panel Accession  [id number]      INFLUENZA A & B AG (RAPID TEST) [[id number]] Collected: 04/27/22 2110    Order Status: Completed Specimen: Nasopharyngeal from Nasal washing Updated: 04/27/22 2231     Influenza A Ag Negative        Comment: NEGATIVE FOR THE PRESENCE OF INFLUENZA A ANTIGEN  INFECTION DUE TO INFLUENZA A CANNOT BE RULED OUT. BECAUSE THE ANTIGEN PRESENT IN THE SAMPLE MAY BE BELOW  THE DETECTION LIMIT OF THE TEST. A NEGATIVE TEST IS PRESUMPTIVE AND IT IS RECOMMENDED THAT THESE RESULTS BE CONFIRMED BY VIRAL CULTURE OR AN FDA-CLEARED INFLUENZA A AND B MOLECULAR ASSAY. Influenza B Ag Negative        Comment: NEGATIVE FOR THE PRESENCE OF INFLUENZA B ANTIGEN  INFECTION DUE TO INFLUENZA B CANNOT BE RULED OUT. BECAUSE THE ANTIGEN PRESENT IN THE SAMPLE MAY BE BELOW  THE DETECTION LIMIT OF THE TEST.   A NEGATIVE TEST IS PRESUMPTIVE AND IT IS RECOMMENDED THAT THESE RESULTS BE CONFIRMED BY VIRAL CULTURE OR AN FDA-CLEARED INFLUENZA A AND B MOLECULAR ASSAY. Source Nasopharyngeal             Other Studies:  No results found.     Current Meds:  Current Facility-Administered Medications   Medication Dose Route Frequency    nitrofurantoin (MACRODANTIN) capsule 100 mg  100 mg Oral Q12H    DULoxetine (CYMBALTA) capsule 60 mg  60 mg Oral DAILY    sodium chloride (NS) flush 5-40 mL  5-40 mL IntraVENous Q8H    sodium chloride (NS) flush 5-40 mL  5-40 mL IntraVENous PRN    acetaminophen (TYLENOL) tablet 650 mg  650 mg Oral Q6H PRN    Or    acetaminophen (TYLENOL) suppository 650 mg  650 mg Rectal Q6H PRN    polyethylene glycol (MIRALAX) packet 17 g  17 g Oral DAILY PRN    cefTRIAXone (ROCEPHIN) 2 g in 0.9% sodium chloride (MBP/ADV) 50 mL MBP  2 g IntraVENous Q24H    heparin (porcine) injection 5,000 Units  5,000 Units SubCUTAneous Q8H    gabapentin (NEURONTIN) capsule 100 mg  100 mg Oral TID    atorvastatin (LIPITOR) tablet 10 mg  10 mg Oral DAILY    mometasone-formoterol (DULERA) 200mcg-5mcg/puff  2 Puff Inhalation BID RT    insulin lispro (HUMALOG) injection 0-10 Units  0-10 Units SubCUTAneous AC&HS    albuterol-ipratropium (DUO-NEB) 2.5 MG-0.5 MG/3 ML  3 mL Nebulization Q4H PRN    HYDROcodone-acetaminophen (NORCO) 5-325 mg per tablet 1 Tablet  1 Tablet Oral Q6H PRN    diphenhydrAMINE (BENADRYL) capsule 25 mg  25 mg Oral Q6H PRN    cetirizine (ZYRTEC) tablet 5 mg  5 mg Oral DAILY PRN       Signed:  Betzaida Emerson MD

## 2022-05-02 VITALS
HEIGHT: 63 IN | WEIGHT: 222.44 LBS | SYSTOLIC BLOOD PRESSURE: 146 MMHG | OXYGEN SATURATION: 96 % | HEART RATE: 65 BPM | BODY MASS INDEX: 39.41 KG/M2 | DIASTOLIC BLOOD PRESSURE: 60 MMHG | TEMPERATURE: 98 F | RESPIRATION RATE: 17 BRPM

## 2022-05-02 PROBLEM — J96.11 CHRONIC RESPIRATORY FAILURE WITH HYPOXIA (HCC): Chronic | Status: ACTIVE | Noted: 2022-05-01

## 2022-05-02 LAB
BACTERIA SPEC CULT: NORMAL
BACTERIA SPEC CULT: NORMAL
GLUCOSE BLD STRIP.AUTO-MCNC: 105 MG/DL (ref 65–100)
GLUCOSE BLD STRIP.AUTO-MCNC: 91 MG/DL (ref 65–100)
GRAM STN SPEC: NORMAL
SERVICE CMNT-IMP: ABNORMAL
SERVICE CMNT-IMP: NORMAL
SERVICE CMNT-IMP: NORMAL

## 2022-05-02 PROCEDURE — 94640 AIRWAY INHALATION TREATMENT: CPT

## 2022-05-02 PROCEDURE — 82962 GLUCOSE BLOOD TEST: CPT

## 2022-05-02 PROCEDURE — 74011250637 HC RX REV CODE- 250/637: Performed by: INTERNAL MEDICINE

## 2022-05-02 PROCEDURE — 94760 N-INVAS EAR/PLS OXIMETRY 1: CPT

## 2022-05-02 PROCEDURE — 74011250637 HC RX REV CODE- 250/637: Performed by: FAMILY MEDICINE

## 2022-05-02 PROCEDURE — G0378 HOSPITAL OBSERVATION PER HR: HCPCS

## 2022-05-02 PROCEDURE — 74011000258 HC RX REV CODE- 258: Performed by: INTERNAL MEDICINE

## 2022-05-02 PROCEDURE — 74011250636 HC RX REV CODE- 250/636: Performed by: INTERNAL MEDICINE

## 2022-05-02 PROCEDURE — 77010033678 HC OXYGEN DAILY

## 2022-05-02 PROCEDURE — 96372 THER/PROPH/DIAG INJ SC/IM: CPT

## 2022-05-02 PROCEDURE — 96376 TX/PRO/DX INJ SAME DRUG ADON: CPT

## 2022-05-02 PROCEDURE — 74011000250 HC RX REV CODE- 250: Performed by: INTERNAL MEDICINE

## 2022-05-02 RX ORDER — CEPHALEXIN 500 MG/1
500 CAPSULE ORAL 3 TIMES DAILY
Qty: 6 CAPSULE | Refills: 0 | Status: SHIPPED | OUTPATIENT
Start: 2022-05-02 | End: 2022-05-04

## 2022-05-02 RX ADMIN — ATORVASTATIN CALCIUM 10 MG: 10 TABLET, FILM COATED ORAL at 08:41

## 2022-05-02 RX ADMIN — ACETAMINOPHEN 650 MG: 325 TABLET ORAL at 02:51

## 2022-05-02 RX ADMIN — SODIUM CHLORIDE, PRESERVATIVE FREE 10 ML: 5 INJECTION INTRAVENOUS at 05:18

## 2022-05-02 RX ADMIN — CEFTRIAXONE 2 G: 2 INJECTION, POWDER, FOR SOLUTION INTRAMUSCULAR; INTRAVENOUS at 00:04

## 2022-05-02 RX ADMIN — DULOXETINE HYDROCHLORIDE 60 MG: 60 CAPSULE, DELAYED RELEASE ORAL at 08:41

## 2022-05-02 RX ADMIN — HEPARIN SODIUM 5000 UNITS: 5000 INJECTION INTRAVENOUS; SUBCUTANEOUS at 05:18

## 2022-05-02 RX ADMIN — MOMETASONE FUROATE AND FORMOTEROL FUMARATE DIHYDRATE 2 PUFF: 200; 5 AEROSOL RESPIRATORY (INHALATION) at 09:53

## 2022-05-02 RX ADMIN — GABAPENTIN 100 MG: 100 CAPSULE ORAL at 08:41

## 2022-05-02 NOTE — PROGRESS NOTES
TRANSFER - OUT REPORT:     Verbal report given to Jessica Castro (name) RN on Brent Cornejo  being transfer to [facility]  for routine progression of care       Report consisted of patients Situation, Background, Assessment and   Recommendations(SBAR).       Information for the following report(s) SBAR, Kardex, ICU Summary, Intake/Output, MAR, Recent Results, Med Rec Status, Cardiac Rhythm NSR with BBB and Alarm Parameters  was reviewed with the receiving nurse.     Opportunity for questions and clarification was provided.       Transportation @9598 with 3L O2

## 2022-05-02 NOTE — DISCHARGE SUMMARY
Hospitalist Discharge Summary   Admit Date:  2022  9:34 PM   DC Note date: 2022  Name:  Willi Bowie   Age:  79 y.o. Sex:  female  :  1940   MRN:  [id number]   Room:  [facility]  PCP:  Karen Friedman MD    Presenting Complaint: Generalized Body Aches    Initial Admission Diagnosis: Sepsis ([facility].) [A41.9]     Problem List for this Hospitalization:  Hospital Problems as of 2022 Date Reviewed: 2018          Codes Class Noted - Resolved POA    Chronic respiratory failure with hypoxia (HCC) (Chronic) ICD-10-CM: J96.11  ICD-9-CM: 518.83, 799.02  2022 - Present Yes        BMI 40.0-44.9, adult ([facility] (Chronic) ICD-10-CM: Z68.41  ICD-9-CM: V85.41  2022 - Present Yes        Sepsis ([facility] ICD-10-CM: A41.9  ICD-9-CM: 038.9, 995.91  2022 - Present Yes        * (Principal) Septic shock ([facility] ICD-10-CM: A41.9, R65.21  ICD-9-CM: 038.9, 785.52, 995.92  2022 - Present Yes        Acute UTI (urinary tract infection) ICD-10-CM: N39.0  ICD-9-CM: 599.0  10/31/2020 - Present Yes        Post herpetic neuralgia (Chronic) ICD-10-CM: B02.29  ICD-9-CM: 053.19  12/10/2015 - Present Yes    Overview Signed 6/15/2018 10:53 AM by Flower Araya MD     Last Assessment & Plan:   She is doing well. Lyrica and tegretol are well tolerated and are affective. Continue medications. Check labs. Follow up in 1 year.              Chronic obstructive lung disease (HCC) (Chronic) ICD-10-CM: J44.9  ICD-9-CM: 496  2014 - Present Yes        Obstructive sleep apnea syndrome (Chronic) ICD-10-CM: G47.33  ICD-9-CM: 327.23  10/4/2013 - Present Yes        Essential hypertension (Chronic) ICD-10-CM: I10  ICD-9-CM: 401.9  2012 - Present Yes            Did Patient have Sepsis (YES OR NO): yes    Admission HPI from 2022:  \" Willi Bowie is a 79 y.o. female with a past medical history significant for type 2 diabetes mellitus, O2 dependent chronic obstructive pulmonary disease presents with chief complaint of generalized weakness. She endorses a 1 week history of dysuria. Upon arrival to the emergency department the patient found profoundly hypotensive. Routine screening labs revealed a urine analysis remarkable for pyuria and hematuria bacteria, leukocytosis and lactic acidemia. The patient was diagnosed with septic shock due to urinary tract infection and referred for ICU admission critical care consultation. Catracho Boykin"    Hospital Course:  Pt was given vanco-zosyn in the ER and then started on rocephin in the ICU.  She was weaned off IV pressors overnight and was transferred out of the ICU thereafter. [address] Hospitalist service consulted to assume care on 4/28/2022. Patient was continued on IV fluids, kidney function back to normal.  Patient presently on 3 L/min nasal cannula, normally uses 5 L/min at home. Chronic left frontal region pain, herpetic neuralgia, recently followed up with neurology in February 2022, on Cymbalta 60 mg daily. Urine culture positive for E. coli and Aerococcus urinae. Patient given Rocephin which covers both. She is improved and stable enough for transfer to rehab facility. BP has been running high at times so would resume losartan despite her reporting not taking at home. Disposition: Skilled Nursing Facility  Diet: ADULT DIET Regular; 3 carb choices (45 gm/meal)  Code Status: Full Code    Follow Up Orders:  No orders of the defined types were placed in this encounter.       Follow-up Information     Follow up With Specialties Details Why Contact Info    [address] [facility] Leonardo Bird, [facility] Go today  [address]    Laure Dumas MD Family Medicine Go [phone]            Follow up labs/diagnostics (ultimately defer to outpatient provider):  CBC, BMP    Time spent in patient discharge and coordination 34 minutes. Plan was discussed with pt. All questions answered. Patient was stable at time of discharge. Instructions given to call a physician or return if any concerns. Discharge Info:   Current Discharge Medication List      START taking these medications    Details   cephALEXin (KEFLEX) 500 mg capsule Take 1 Capsule by mouth three (3) times daily for 2 days. Qty: 6 Capsule, Refills: 0  Start date: 5/2/2022, End date: 5/4/2022         CONTINUE these medications which have NOT CHANGED    Details   tiotropium bromide (SPIRIVA RESPIMAT) 2.5 mcg/actuation inhaler Take 2 Puffs by inhalation daily. Indications: bronchospasm prevention with COPD  Qty: 1 Inhaler, Refills: 1      gabapentin (NEURONTIN) 100 mg capsule Take 1 Cap by mouth three (3) times daily. Qty: 30 Cap, Refills: 0      BREO ELLIPTA 100-25 mcg/dose inhaler INHALE 1 PUFF BY MOUTH EVERY DAY  Refills: 11      lovastatin (MEVACOR) 40 mg tablet Take 40 mg by mouth nightly. travoprost (TRAVATAN Z) 0.004 % ophthalmic solution Administer 1 Drop to both eyes every evening. Cholecalciferol, Vitamin D3, (VITAMIN D3) 2,000 unit cap capsule Take 2,000 Units by mouth two (2) times a day. Qty: 60 Cap, Refills: 2    Associated Diagnoses: Vitamin D deficiency         STOP taking these medications       losartan (COZAAR) 25 mg tablet Comments:   Reason for Stopping:               Procedures done this admission:  * No surgery found *    Consults this admission:  IP CONSULT TO HOSPITALIST    Echocardiogram/EKG results:  No results found for this or any previous visit.        EKG Results     Procedure 720 Value Units Date/Time    EKG, 12 LEAD, INITIAL [[id number]] Collected: 04/27/22 2147    Order Status: Completed Updated: 04/28/22 0617     Ventricular Rate 87 BPM      Atrial Rate 88 BPM      P-R Interval 176 ms      QRS Duration 139 ms      Q-T Interval 390 ms      QTC Calculation (Bezet) 470 ms      Calculated P Axis 74 degrees      Calculated R Axis -26 degrees      Calculated T Axis 67 degrees      Diagnosis --     Sinus rhythm  Ventricular premature complex  Right bundle branch block    Confirmed by Priscila Nuñez MD (), Denisse Beckford ([id number]) on 4/28/2022 6:17:52 AM            Diagnostic Imaging/Tests:   US RETROPERITONEUM COMP    Result Date: 4/28/2022  1. No hydronephrosis of either kidney. XR CHEST PORT    Result Date: 4/27/2022  No acute process. All Micro Results     Procedure Component Value Units Date/Time    BLOOD CULTURE [[id number]] Collected: 04/27/22 2207    Order Status: Completed Specimen: Blood Updated: 05/02/22 1004     Special Requests: --        LEFT  HAND       GRAM STAIN       GRAM POSITIVE COCCI GRAM POSITIVE RODS                  AEROBIC AND ANAEROBIC BOTTLES                  RESULTS VERIFIED, PHONED TO AND READ BACK BY MELINDA GASPAR @ 625 ON 4/28/22 MG                   RESULTS VERIFIED, PHONED TO AND READ BACK BY MAILE ZAVALA RN AT 0526 ON 4/29/22 ACL           Culture result:       MULTIPLE ORGANISMS PRESENT SUGGESTIVE OF CONTAMINANTS. NO FURTHER EVALUATION IS NECCESSARY, UNLESS CLINICALLY INDICATED. This organism may be indicative of culture contamination. Clinical correlation needs to be evaluated as each case is unique.               Refer to Blood Culture ID Panel Accession  [id number]      CULTURE, BLOOD [[id number]] Collected: 04/29/22 0948    Order Status: Completed Specimen: Blood Updated: 05/02/22 0657     Special Requests: --        RIGHT  FOREARM       Culture result: NO GROWTH 3 DAYS       CULTURE, BLOOD [[id number]] Collected: 04/29/22 0951    Order Status: Completed Specimen: Blood Updated: 05/02/22 0657     Special Requests: --        LEFT  FOREARM       Culture result: NO GROWTH 3 DAYS       BLOOD CULTURE [[id number]] Collected: 04/28/22 0327    Order Status: Completed Specimen: Blood Updated: 05/02/22 0657     Special Requests: --        LEFT  FOREARM       Culture result: NO GROWTH 4 DAYS       CULTURE, URINE [[id number]] (Abnormal)  (Susceptibility) Collected: 04/27/22 2217    Order Status: Completed Specimen: Cath Urine Updated: 05/01/22 0730     Special Requests: NO SPECIAL REQUESTS        Culture result:       >100,000 COLONIES/mL ESCHERICHIA COLI                  50,000-100,000 COLONIES/mL AEROCOCCUS URINAE                  Aerococcus species are usually susceptible to beta-lactams and vancomycin. In addition, Aerococcus urinae is typically susceptible to nitrofurantoin. A.urinae are usually described as resistant to trimethoprim-sulfmethoxazole. Resistance to fluoroquinolones has been described in Aerococcus species (CLSI, M45, 3rd edition, 2015)          COVID-19 RAPID TEST [[id number]] Collected: 04/29/22 1424    Order Status: Completed Specimen: Nasopharyngeal Updated: 04/29/22 1453     Specimen source NASAL        COVID-19 rapid test Not detected        Comment:      The specimen is NEGATIVE for SARS-CoV-2, the novel coronavirus associated with COVID-19. A negative result does not rule out COVID-19. This test has been authorized by the FDA under an Emergency Use Authorization (EUA) for use by authorized laboratories. Fact sheet for Healthcare Providers: ConventionUpdate.co.nz  Fact sheet for Patients: ConventionUpdate.co.nz       Methodology: Isothermal Nucleic Acid Amplification         BLOOD CULTURE ID PANEL [[id number]]  (Abnormal) Collected: 04/27/22 2207    Order Status: Completed Specimen: Blood Updated: 04/29/22 0654     Acc. no. from Micro Order [id number]     Staphylococcus Detected        Comment: RESULTS VERIFIED, PHONED TO AND READ BACK BY  MELINDA GASPAR RN @ 0303 ON 4/28/22 MG           mecA (Methicillin-Resistance Genes) Detected        Comment: Presence of mecA is highly indicative of methicillin resistance. The test does not replace traditional culture and susceptibilities        INTERPRETATION       Gram positive cocci in clusters.  Identified by realtime PCR as  Coagulase negative Staphylococci           Comment: A single positive culture of coagulase negative Staph is likely to be a contaminant in adult patients. Consider discontinuation of antibiotics for gram positive bloodstream infections if patient asymptomatic. THIS TEST DOES NOT REPLACE SENSITIVITY TESTING. INFLUENZA A & B AG (RAPID TEST) [[id number]] Collected: 04/27/22 2110    Order Status: Completed Specimen: Nasopharyngeal from Nasal washing Updated: 04/27/22 2231     Influenza A Ag Negative        Comment: NEGATIVE FOR THE PRESENCE OF INFLUENZA A ANTIGEN  INFECTION DUE TO INFLUENZA A CANNOT BE RULED OUT. BECAUSE THE ANTIGEN PRESENT IN THE SAMPLE MAY BE BELOW  THE DETECTION LIMIT OF THE TEST. A NEGATIVE TEST IS PRESUMPTIVE AND IT IS RECOMMENDED THAT THESE RESULTS BE CONFIRMED BY VIRAL CULTURE OR AN FDA-CLEARED INFLUENZA A AND B MOLECULAR ASSAY. Influenza B Ag Negative        Comment: NEGATIVE FOR THE PRESENCE OF INFLUENZA B ANTIGEN  INFECTION DUE TO INFLUENZA B CANNOT BE RULED OUT. BECAUSE THE ANTIGEN PRESENT IN THE SAMPLE MAY BE BELOW  THE DETECTION LIMIT OF THE TEST. A NEGATIVE TEST IS PRESUMPTIVE AND IT IS RECOMMENDED THAT THESE RESULTS BE CONFIRMED BY VIRAL CULTURE OR AN FDA-CLEARED INFLUENZA A AND B MOLECULAR ASSAY.           Source Nasopharyngeal             Labs: Results:       BMP, Mg, Phos Recent Labs     05/01/22 0527 04/30/22 0419   * 146*   K 4.1 4.0   * 112*   CO2 30 28   AGAP 4* 6*   BUN 21 27*   CREA 0.80 1.00   CA 8.7 8.7   GLU 89 88      CBC Recent Labs     04/30/22 0419   WBC 7.5   RBC 4.01*   HGB 12.1   HCT 39.4      GRANS 52   LYMPH 28   EOS 6   MONOS 14*   BASOS 0   IG 1   ANEU 3.9   ABL 2.1   MERCEDES 0.4   ABM 1.0   ABB 0.0   AIG 0.0      LFT Recent Labs     04/30/22  0419   ALT 29   AP 74   TP 6.3   ALB 2.3*   GLOB 4.0*   AGRAT 0.6*      Cardiac Testing Lab Results   Component Value Date/Time     04/27/2022 09:08 PM      Coagulation Tests Lab Results   Component Value Date/Time    Prothrombin time 12.4 08/31/2018 11:53 AM    INR 1.0 08/31/2018 11:53 AM    aPTT <20.0 (L) 08/31/2018 11:53 AM      A1c Lab Results   Component Value Date/Time    Hemoglobin A1c 5.5 11/04/2020 05:05 AM      Lipid Panel Lab Results   Component Value Date/Time    Cholesterol, total 185 06/15/2018 11:28 AM    HDL Cholesterol 54 06/15/2018 11:28 AM    LDL, calculated 109 (H) 06/15/2018 11:28 AM    VLDL, calculated 22 06/15/2018 11:28 AM    Triglyceride 109 06/15/2018 11:28 AM      Thyroid Panel Lab Results   Component Value Date/Time    TSH 1.030 10/31/2020 03:23 PM    TSH 0.753 06/15/2018 11:28 AM    T4, Total 9.6 06/15/2018 11:28 AM        Most Recent UA Lab Results   Component Value Date/Time    Color YELLOW 10/31/2020 04:42 PM    Appearance CLOUDY 10/31/2020 04:42 PM    Specific gravity 1.014 10/31/2020 04:42 PM    pH (UA) 7.0 10/31/2020 04:42 PM    Protein TRACE (A) 10/31/2020 04:42 PM    Glucose Negative 10/31/2020 04:42 PM    Ketone Negative 10/31/2020 04:42 PM    Bilirubin Negative 10/31/2020 04:42 PM    Blood Negative 10/31/2020 04:42 PM    Urobilinogen 2.0 (H) 10/31/2020 04:42 PM    Nitrites Negative 10/31/2020 04:42 PM    Leukocyte Esterase SMALL (A) 10/31/2020 04:42 PM    WBC >100 04/27/2022 10:17 PM    RBC 10-20 04/27/2022 10:17 PM    Epithelial cells 3-5 04/27/2022 10:17 PM    Bacteria 4+ (H) 04/27/2022 10:17 PM    Casts HYALINE 04/27/2022 10:17 PM    Crystals, urine 0 04/27/2022 10:17 PM    Mucus 0 04/27/2022 10:17 PM          All Labs from Last 24 Hrs:  Recent Results (from the past 24 hour(s))   GLUCOSE, POC    Collection Time: 05/01/22 11:39 AM   Result Value Ref Range    Glucose (POC) 99 65 - 100 mg/dL    Performed by Iona    PLEASE READ & DOCUMENT PPD TEST IN 72 HRS    Collection Time: 05/01/22  3:20 PM   Result Value Ref Range    PPD Negative Negative    mm Induration 0 0 - 5 mm   GLUCOSE, POC    Collection Time: 05/01/22  4:10 PM   Result Value Ref Range    Glucose (POC) 111 (H) 65 - 100 mg/dL    Performed by Shazia    GLUCOSE, POC    Collection Time: 05/01/22  8:41 PM   Result Value Ref Range    Glucose (POC) 94 65 - 100 mg/dL    Performed by Waleska Shabazz    GLUCOSE, POC    Collection Time: 05/02/22  7:43 AM   Result Value Ref Range    Glucose (POC) 105 (H) 65 - 100 mg/dL    Performed by Delvin        Current Med List in Hospital:   Current Facility-Administered Medications   Medication Dose Route Frequency    DULoxetine (CYMBALTA) capsule 60 mg  60 mg Oral DAILY    ondansetron (ZOFRAN) injection 4 mg  4 mg IntraVENous Q8H PRN    sodium chloride (NS) flush 5-40 mL  5-40 mL IntraVENous Q8H    sodium chloride (NS) flush 5-40 mL  5-40 mL IntraVENous PRN    acetaminophen (TYLENOL) tablet 650 mg  650 mg Oral Q6H PRN    Or    acetaminophen (TYLENOL) suppository 650 mg  650 mg Rectal Q6H PRN    polyethylene glycol (MIRALAX) packet 17 g  17 g Oral DAILY PRN    heparin (porcine) injection 5,000 Units  5,000 Units SubCUTAneous Q8H    gabapentin (NEURONTIN) capsule 100 mg  100 mg Oral TID    atorvastatin (LIPITOR) tablet 10 mg  10 mg Oral DAILY    mometasone-formoterol (DULERA) 200mcg-5mcg/puff  2 Puff Inhalation BID RT    insulin lispro (HUMALOG) injection 0-10 Units  0-10 Units SubCUTAneous AC&HS    albuterol-ipratropium (DUO-NEB) 2.5 MG-0.5 MG/3 ML  3 mL Nebulization Q4H PRN    HYDROcodone-acetaminophen (NORCO) 5-325 mg per tablet 1 Tablet  1 Tablet Oral Q6H PRN    diphenhydrAMINE (BENADRYL) capsule 25 mg  25 mg Oral Q6H PRN    cetirizine (ZYRTEC) tablet 5 mg  5 mg Oral DAILY PRN       Allergies   Allergen Reactions    Chocolate Flavor Angioedema    Triamcinolone Acetonide Nausea Only     Immunization History   Administered Date(s) Administered    Influenza Vaccine GridX) PF (>6 Mo Flulaval, Fluarix, and >3 Yrs Afluria, Fluzone 58013) 11/02/2020    TB Skin Test (PPD) Intradermal 04/28/2022       Recent Vital Data:  Patient Vitals for the past 24 hrs:   Temp Pulse Resp BP SpO2   05/02/22 0953 -- -- -- -- 94 %   05/02/22 0800 98.4 °F (36.9 °C) 64 17 (!) 143/61 94 %   05/02/22 0317 97.8 °F (36.6 °C) 70 18 (!) 125/96 94 %   05/01/22 2300 97.7 °F (36.5 °C) 72 16 (!) 154/54 91 %   05/01/22 2138 -- -- -- -- 100 %   05/01/22 2000 98.6 °F (37 °C) 68 15 138/72 96 %   05/01/22 1545 97.9 °F (36.6 °C) 63 16 (!) 169/65 100 %   05/01/22 1115 98.4 °F (36.9 °C) 66 20 115/67 96 %     Oxygen Therapy  O2 Sat (%): 94 % (05/02/22 0953)  Pulse via Oximetry: 67 beats per minute (05/02/22 0953)  O2 Device: Nasal cannula (05/02/22 0953)  Skin Assessment: Clean, dry, & intact (05/01/22 2138)  Skin Protection for O2 Device: N/A (04/28/22 1920)  O2 Flow Rate (L/min): 3 l/min (05/02/22 0953)  FIO2 (%): 32 % (05/01/22 2138)    Estimated body mass index is 39.4 kg/m² as calculated from the following:    Height as of this encounter: 5' 3\" (1.6 m). Weight as of this encounter: 100.9 kg (222 lb 7.1 oz). Intake/Output Summary (Last 24 hours) at 5/2/2022 1027  Last data filed at 5/2/2022 0317  Gross per 24 hour   Intake 60 ml   Output 1350 ml   Net -1290 ml         Physical Exam:    General:    Well nourished. No overt distress  Head:  Normocephalic, atraumatic  Eyes:  Sclerae appear normal.  Pupils equally round. HENT:  Nares appear normal, no drainage. Moist mucous membranes  Neck:  No restricted ROM. Trachea midline  CV:   RRR. No JVD  Lungs:   CTAB. Even, unlabored  Abdomen:   Nondistended. Extremities: No cyanosis or clubbing. No edema. Skin:     No rashes. Normal coloration  Neuro:  CN II-XII grossly intact. Psych:  Normal mood and affect. Signed:  Alanna Garber MD    Part of this note may have been written by using a voice dictation software. The note has been proof read but may still contain some grammatical/other typographical errors.

## 2022-05-02 NOTE — PROGRESS NOTES
Pt to d/c today to [address] [place] [address] for STR. Room: [facility]. Report: [phone]. Transport scheduled via [facility] for [phone]. Pt is requiring 3L O2 NC. CM updated pt, pt's nurse, and pt's daughter Sue Frances. No other supportive care needs identified. Pt and family agree with d/c plan. Milestones met. Care Management Interventions  PCP Verified by CM: Yes (Frederick Uribe. Gabriela Valerio MD)  Mode of Transport at Discharge: BLS ([facility])  Transition of Care Consult (CM Consult): Discharge Planning,SNF  Partner SNF: No  Reason Why Partner SNF Not Chosen: Friend/family recommendation  Discharge Durable Medical Equipment: No  Physical Therapy Consult: Yes  Occupational Therapy Consult: Yes  Speech Therapy Consult: No  Support Systems: Child(christian)  Confirm Follow Up Transport: Other (see comment) [facility])  The Plan for Transition of Care is Related to the Following Treatment Goals : Pt requires STR to return to functional baseline in the community.   The Patient and/or Patient Representative was Provided with a Choice of Provider and Agrees with the Discharge Plan?: Yes  Name of the Patient Representative Who was Provided with a Choice of Provider and Agrees with the Discharge Plan: Marce Clarke  Freedom of Choice List was Provided with Basic Dialogue that Supports the Patient's Individualized Plan of Care/Goals, Treatment Preferences and Shares the Quality Data Associated with the Providers?: Yes   Resource Information Provided?: No  Discharge Location  Patient Expects to be Discharged to[de-identified] Skilled nursing facility ([address] [place] [address] for STR)

## 2022-05-02 NOTE — PROGRESS NOTES
Shift assessment complete. Patient is alert and oriented, in bed. Respirations are even and unlabored, patient does experience dyspnea with exertion. Lung sounds are diminished. Patient has an ongoing cough. Receiving 3 liters of oxygen via nasal cannula. Bowel sounds are present. Trace edema in lower extremities. Wounds on buttocks dressed. Dressing clean, dry and intact. Patient has Pure wick in place draining urine. Denies pain. No needs expressed at this time. Bed low and locked, call light within reach.   ______________________________________________    Problem: Pressure Injury - Risk of  Goal: *Prevention of pressure injury  Description: Document Tanmay Scale and appropriate interventions in the flowsheet. Note: Pressure Injury Interventions:  Sensory Interventions: Keep linens dry and wrinkle-free    Moisture Interventions: Internal/External urinary devices    Activity Interventions: Pressure redistribution bed/mattress(bed type)    Mobility Interventions: Pressure redistribution bed/mattress (bed type)    Nutrition Interventions: Offer support with meals,snacks and hydration    Friction and Shear Interventions: Lift sheet,Minimize layers    Problem: Falls - Risk of  Goal: *Absence of Falls  Description: Document Courtney Fall Risk and appropriate interventions in the flowsheet.   Outcome: Progressing Towards Goal  Note: Fall Risk Interventions:  Mobility Interventions: Communicate number of staff needed for ambulation/transfer     Medication Interventions: Patient to call before getting OOB,Teach patient to arise slowly    Elimination Interventions: Call light in reach

## 2022-05-03 LAB
BACTERIA SPEC CULT: NORMAL
SERVICE CMNT-IMP: NORMAL

## 2022-05-04 LAB
BACTERIA SPEC CULT: NORMAL
BACTERIA SPEC CULT: NORMAL
SERVICE CMNT-IMP: NORMAL
SERVICE CMNT-IMP: NORMAL

## 2022-12-04 ENCOUNTER — APPOINTMENT (OUTPATIENT)
Dept: GENERAL RADIOLOGY | Age: 82
End: 2022-12-04
Payer: MEDICARE

## 2022-12-04 ENCOUNTER — HOSPITAL ENCOUNTER (EMERGENCY)
Age: 82
Discharge: HOME | End: 2022-12-04
Attending: EMERGENCY MEDICINE
Payer: MEDICARE

## 2022-12-04 VITALS
SYSTOLIC BLOOD PRESSURE: 127 MMHG | HEART RATE: 84 BPM | RESPIRATION RATE: 20 BRPM | TEMPERATURE: 98.2 F | OXYGEN SATURATION: 99 % | DIASTOLIC BLOOD PRESSURE: 53 MMHG

## 2022-12-04 DIAGNOSIS — M79.604 RIGHT LEG PAIN: Primary | ICD-10-CM

## 2022-12-04 PROCEDURE — 73590 X-RAY EXAM OF LOWER LEG: CPT

## 2022-12-04 PROCEDURE — 94640 AIRWAY INHALATION TREATMENT: CPT

## 2022-12-04 PROCEDURE — 99284 EMERGENCY DEPT VISIT MOD MDM: CPT

## 2022-12-04 PROCEDURE — 6370000000 HC RX 637 (ALT 250 FOR IP): Performed by: EMERGENCY MEDICINE

## 2022-12-04 PROCEDURE — 71045 X-RAY EXAM CHEST 1 VIEW: CPT

## 2022-12-04 PROCEDURE — 2700000000 HC OXYGEN THERAPY PER DAY

## 2022-12-04 RX ORDER — IPRATROPIUM BROMIDE AND ALBUTEROL SULFATE 2.5; .5 MG/3ML; MG/3ML
1 SOLUTION RESPIRATORY (INHALATION)
Status: COMPLETED | OUTPATIENT
Start: 2022-12-04 | End: 2022-12-04

## 2022-12-04 RX ORDER — IBUPROFEN 400 MG/1
400 TABLET ORAL
Status: COMPLETED | OUTPATIENT
Start: 2022-12-04 | End: 2022-12-04

## 2022-12-04 RX ORDER — PREDNISONE 20 MG/1
20 TABLET ORAL DAILY
Qty: 3 TABLET | Refills: 0 | Status: SHIPPED | OUTPATIENT
Start: 2022-12-04 | End: 2022-12-07

## 2022-12-04 RX ORDER — PREDNISONE 20 MG/1
20 TABLET ORAL
Status: COMPLETED | OUTPATIENT
Start: 2022-12-04 | End: 2022-12-04

## 2022-12-04 RX ADMIN — PREDNISONE 20 MG: 20 TABLET ORAL at 20:49

## 2022-12-04 RX ADMIN — IPRATROPIUM BROMIDE AND ALBUTEROL SULFATE 1 AMPULE: .5; 3 SOLUTION RESPIRATORY (INHALATION) at 17:59

## 2022-12-04 RX ADMIN — IBUPROFEN 400 MG: 400 TABLET, FILM COATED ORAL at 20:49

## 2022-12-04 ASSESSMENT — PAIN DESCRIPTION - LOCATION: LOCATION: LEG

## 2022-12-04 ASSESSMENT — PAIN DESCRIPTION - ORIENTATION: ORIENTATION: RIGHT

## 2022-12-04 ASSESSMENT — PAIN SCALES - GENERAL: PAINLEVEL_OUTOF10: 10

## 2022-12-04 ASSESSMENT — ENCOUNTER SYMPTOMS: COLOR CHANGE: 1

## 2022-12-04 NOTE — ED TRIAGE NOTES
Pt arrives via [facility] from home. Pt complains of R leg pain x2 weeks. Complains of worsening pain since onset. Denies fall or any other known trauma. Pt hx of COPD and wears 3LNC at baseline. +smoker. Upon EMS arrival pt was not wearing required O2. EMS administered 5mg albuterol tx.

## 2022-12-05 NOTE — DISCHARGE INSTRUCTIONS
Use her splints at home  Rest, ice, elevate  3 days of prednisone  Use her Voltaren gel  Follow-up with orthopedics if not improving after a couple days

## 2022-12-05 NOTE — ED NOTES
I have reviewed discharge instructions with the patient. The patient verbalized understanding. Patient left ED via Discharge Method: wheelchair to Home with daughter. Opportunity for questions and clarification provided. Patient given 1 scripts, electronically sent. To continue your aftercare when you leave the hospital, you may receive an automated call from our care team to check in on how you are doing. This is a free service and part of our promise to provide the best care and service to meet your aftercare needs.  If you have questions, or wish to unsubscribe from this service please call [phone]. Thank you for Choosing our [facility] Emergency Department.         Renae Lozano RN  12/04/22 2107       Renae Lozano RN  12/04/22 2107

## 2022-12-05 NOTE — ED PROVIDER NOTES
[facility] Emergency Department Provider Note                   PCP:                Jabier gil MD               Age: 79 y.o. Sex: female     No diagnosis found. DISPOSITION         New Prescriptions    No medications on file       Orders Placed This Encounter   Procedures    XR TIBIA FIBULA RIGHT (2 VIEWS)    XR CHEST PORTABLE         Jonell Osler is a 79 y.o. female who presents to the Emergency Department with chief complaint of    Chief Complaint   Patient presents with    Leg Pain      Chief complaint : Leg and ankle pain    HISTORY OF PRESENT ILLNESS :  Location : Right-sided, from the knee all the way down    Quality : Sharp and aching    Quantity : Constant    Timing : 2 weeks    Severity : Moderate    Context : Denies injury but then states that she frequently may knock her ankle on furniture or doors for example    Alleviating / exacerbating factors : Worse with ambulation    Associated Symptoms : Dark purple ecchymosis about the medial ankle    -------------------------------          Review of Systems   Musculoskeletal:  Positive for arthralgias and gait problem. Negative for joint swelling. Skin:  Positive for color change. Negative for wound. Neurological:  Negative for weakness and numbness. All other systems reviewed and are negative. All other systems reviewed and are negative. Past Medical History:   Diagnosis Date    Arthritis     Diabetes ([facility] [address]     Hypertension     Shingles         No past surgical history on file. No family history on file. Social Connections: Not on file        No Known Allergies     Vitals signs and nursing note reviewed. Patient Vitals for the past 4 hrs:   Temp Pulse Resp BP SpO2   12/04/22 1759 -- -- -- -- 97 %   12/04/22 1730 98.2 °F (36.8 °C) 82 18 (!) 104/59 96 %          Physical Exam  Vitals and nursing note reviewed. Constitutional:       General: She is not in acute distress. Appearance: Normal appearance.  She is not ill-appearing, toxic-appearing or diaphoretic. HENT:      Head: Normocephalic and atraumatic. Right Ear: External ear normal.      Left Ear: External ear normal.      Nose: Nose normal. No rhinorrhea. Eyes:      General: No scleral icterus. Right eye: No discharge. Left eye: No discharge. Extraocular Movements: Extraocular movements intact. Conjunctiva/sclera: Conjunctivae normal.   Cardiovascular:      Rate and Rhythm: Normal rate and regular rhythm. Pulmonary:      Effort: Pulmonary effort is normal. No respiratory distress. Breath sounds: Normal breath sounds. No wheezing. Musculoskeletal:         General: No deformity or signs of injury. Normal range of motion. Cervical back: Normal range of motion and neck supple. No rigidity. Legs:    Skin:     General: Skin is warm and dry. Coloration: Skin is not jaundiced or pale. Neurological:      General: No focal deficit present. Mental Status: She is alert and oriented to person, place, and time. Psychiatric:         Mood and Affect: Mood normal.         Behavior: Behavior normal.        MDM  Number of Diagnoses or Management Options  Right leg pain  Diagnosis management comments:  Ankle pain, no fracture on x-ray, suspect contusion in light of overlying ecchymosis  Recommends that the use the Voltaren gel, will give a couple days of prednisone very low dose as she is diet-controlled diabetic,       Amount and/or Complexity of Data Reviewed  Tests in the radiology section of CPT®: reviewed and ordered  Decide to obtain previous medical records or to obtain history from someone other than the patient: yes    Risk of Complications, Morbidity, and/or Mortality  Presenting problems: moderate  Diagnostic procedures: low  Management options: low    Patient Progress  Patient progress: improved      Procedures    Labs Reviewed - No data to display     XR TIBIA FIBULA RIGHT (2 VIEWS)   Final Result   No acute osseous abnormality of the right tibia and fibula. XR CHEST PORTABLE   Final Result   No acute abnormality. Kathy Coma Scale  Eye Opening: Spontaneous  Best Verbal Response: Oriented  Best Motor Response: Obeys commands  Kathy Coma Scale Score: 15                     Voice dictation software was used during the making of this note. This software is not perfect and grammatical and other typographical errors may be present. This note has not been completely proofread for errors.        Carlos Willis MD  12/04/22 2022       Carlos Willis MD  12/04/22 3338

## 2024-05-31 ENCOUNTER — TELEPHONE (OUTPATIENT)
Dept: NEUROLOGY | Age: 84
End: 2024-05-31

## 2025-08-18 ENCOUNTER — TELEPHONE (OUTPATIENT)
Dept: NEUROSURGERY | Age: 85
End: 2025-08-18